# Patient Record
Sex: MALE | Race: WHITE | NOT HISPANIC OR LATINO | Employment: FULL TIME | URBAN - METROPOLITAN AREA
[De-identification: names, ages, dates, MRNs, and addresses within clinical notes are randomized per-mention and may not be internally consistent; named-entity substitution may affect disease eponyms.]

---

## 2017-01-11 ENCOUNTER — HOSPITAL ENCOUNTER (OUTPATIENT)
Dept: RADIOLOGY | Facility: MEDICAL CENTER | Age: 57
End: 2017-01-11
Attending: INTERNAL MEDICINE
Payer: COMMERCIAL

## 2017-01-11 DIAGNOSIS — L40.50 PSORIATIC ARTHROPATHY (HCC): ICD-10-CM

## 2017-01-11 DIAGNOSIS — L40.50 PSORIATIC ARTHRITIS (HCC): ICD-10-CM

## 2017-01-11 PROCEDURE — 72202 X-RAY EXAM SI JOINTS 3/> VWS: CPT

## 2017-01-11 PROCEDURE — 77077 JOINT SURVEY SINGLE VIEW: CPT

## 2017-01-12 ENCOUNTER — TELEPHONE (OUTPATIENT)
Dept: RHEUMATOLOGY | Facility: PHYSICIAN GROUP | Age: 57
End: 2017-01-12

## 2017-01-12 NOTE — TELEPHONE ENCOUNTER
Please notify patient we got x-ray reports back and her x-rays do indicate some arthritis compatible with psoriatic arthritis in the left foot x-ray, we can discuss further at her appointment March 2017 or patient wants to have a sooner appointment prescheduled earlier appointment, same day okay

## 2017-01-13 ENCOUNTER — TELEPHONE (OUTPATIENT)
Dept: RHEUMATOLOGY | Facility: PHYSICIAN GROUP | Age: 57
End: 2017-01-13

## 2017-01-13 DIAGNOSIS — R79.89 LFT ELEVATION: ICD-10-CM

## 2017-01-13 NOTE — TELEPHONE ENCOUNTER
Please notify patient when he got the results of blood tests done January 12, 2017, shows liver functions little bit elevated, did the patient drink any alcohol within 3 days of doing the blood tests? If so we need to recheck the liver tests to assure that they are back to normal, patient did not drink any alcohol within 3 days of doing the liver tests and we need to do more investigation such as an ultrasound to assure no gallbladder problems.  Liver tests ordered in the computer

## 2017-01-19 ENCOUNTER — OFFICE VISIT (OUTPATIENT)
Dept: RHEUMATOLOGY | Facility: PHYSICIAN GROUP | Age: 57
End: 2017-01-19
Payer: COMMERCIAL

## 2017-01-19 VITALS — BODY MASS INDEX: 30.14 KG/M2 | SYSTOLIC BLOOD PRESSURE: 120 MMHG | DIASTOLIC BLOOD PRESSURE: 70 MMHG | WEIGHT: 216 LBS

## 2017-01-19 DIAGNOSIS — K21.00 GASTROESOPHAGEAL REFLUX DISEASE WITH ESOPHAGITIS: ICD-10-CM

## 2017-01-19 DIAGNOSIS — L40.50 PSORIATIC ARTHRITIS (HCC): ICD-10-CM

## 2017-01-19 DIAGNOSIS — R79.89 LFT ELEVATION: ICD-10-CM

## 2017-01-19 PROCEDURE — 99214 OFFICE O/P EST MOD 30 MIN: CPT | Performed by: INTERNAL MEDICINE

## 2017-01-19 NOTE — Clinical Note
[facility]   [address], [address]  [place] [address]  Phone: [phone]  Fax: [phone]              Encounter Date: 1/19/2017    Dear Dr. Ling ref. provider found,    It was a pleasure seeing your patient, Evens Garcia, on 1/19/2017. Diagnoses of Psoriatic arthritis (CMS-[facility]), LFT elevation, and Gastroesophageal reflux disease with esophagitis were pertinent to this visit.     Please find attached progress note which includes the history I obtained from Mr. Garcia, my physical examination findings, my impression and recommendations.      Once again, it was a pleasure participating in your patient's care.  Please feel free to contact me if you have any questions or if I can be of any further assistance to your patients.      Sincerely,    Henny Yuan M.D.  Electronically Signed          PROGRESS NOTE:  Chief Complaint- joint pain    Subjective:   Evens Garcia is a 56 y.o. male here today for follow up of rheumatological issues    This is the second visit for this Pt who was  was dx with PSA about 2013 was treated with Dr Alva who has now moved out of the area, initially treated with methotrexate and sulfasalazine for which patient felt no difference, we evaluated the patient found minimal evidence for psoriatic arthritis, at last visit we did x-rays of sacroiliac joints, feet and hands for which the feet x-rays do show erosive arthritis on the fifth MTP joint on the left foot10. Patient currently off of all DMARD's for the last 10 months with what patient describes as minimal joint aches and pains. Patient continues to take naproxen without GI upset. Psoriasis unchanged, spot predmonantly in scalp. Pt uses Tar shampoo to keep scalp psoriasis under control. No iritis/uveitis, no DVT/PE, no wieght loss, no FUO, no organ problems, no achilles tendon problems, no colitis, stiff low back, pt has diverticulitis, denies any diabetes, denies any thyroid  problems  Patient currently works as a . Patient is also worried about his recent lab results indicating elevated liver function tests, as patient is a  and his  exam to maintain being a problem that requires liver function tests be normal. Patient does drink a glass or 2 of wine with dinner and the elevated liver functions are probably secondary to the dinner wine, we will recheck liver functions when patient is without wine for at least 3-4 days.    S/p MTX-nausea  S/p sulfasalazine-no benefit    RF neg 3/2013  TINA neg 3/2013  Feet x-rays 1/2017-indicates erosive arthritis on the left fifth MTP joint  Hand x-rays 1/2017-indicates DJD  SI joint x-rays 1/2017-no pathology    Current medicines (including changes today)  Current Outpatient Prescriptions   Medication Sig Dispense Refill   • Adalimumab 40 MG/0.8ML Prefilled Syringe Kit Inject 0.8 mL as instructed every 14 days. 6 Each 1   • fluocinonide (LIDEX) 0.05 % Cream Apply thin amount bid to rash prn 60 g 1   • naproxen (NAPROSYN) 500 MG Tab 1 tab po bid prn joint pain, take with food 180 Tab 0   • omeprazole (PRILOSEC) 20 MG delayed-release capsule Take 1 Cap by mouth every day. 90 Cap 3   • Naproxen Sodium (ALEVE) 220 MG Cap Take  by mouth.     • ibuprofen (MOTRIN) 200 MG TABS Take 200 mg by mouth every 6 hours as needed. Takes two prn        No current facility-administered medications for this visit.     He  has no past medical history on file.    ROS   Other than what is mentioned in HPI or physical exam, there is no history of headaches, double vision or blurred vision. No temporal tenderness or jaw claudication. No history of cataracts or glaucoma. No trouble swallowing difficulties or sore throats. No history of thyroid disease. No chest complaints including chest pain, cough or sputum production. No shortness of breath. No GI complaints including nausea, vomiting, change in bowel habits, or past peptic ulcer disease. No history of blood in  the stools. No urinary complaints including dysuria or frequency. No history of rash including psoriasis. No history of alopecia, photosensitivity, oral ulcerations, Raynaud's phenomena, or swollen joints. No history of gout. No back complaints. No history of low blood counts.       Objective:     Blood pressure 120/70, weight 97.977 kg (216 lb). Body mass index is 30.14 kg/(m^2).   Physical Exam:  Constitutional: Alert and oriented X3, no distress.Skin: Warm, dry, good turgor, no rashes in visible areas, small scalp with psoriasis on the right lateral aspect of the headEye: Equal, round and reactive, conjunctiva clear, lids normal EOM intact, patient wears glassesENMT: Lips without lesions, good dentition, no oropharyngeal ulcers, moist buccal mucosa, pinna without deformityNeck: Trachea midline, no masses, no thyromegaly.Lymph:  No cervical lymphadenopathy, no axillary lymphadenopathy, no supraclavicular lymphadenopathyRespiratory: Unlabored respiratory effort, lungs clear to auscultation, no wheezes, no ronchi.Cardiovascular: Normal S1, S2, no murmur, no edema.Abdomen: Soft, non-tender, no masses, no hepatosplenomegaly.Psych: Alert and oriented x3, normal affect and mood.Neuro: Cranial nerves 2-12 are grossly intact, no loss of sensation LEExt:no joint laxity noted in bilateral arms, no joint laxity noted in bilateral legs, no evidence of effusions, no swan-neck or boutonniere deformities,no flexure contractures, shoulders full range of motion, knees with mild crepitus but no effusions, no Achilles tendon inflammation, no sausage digits noted neither on toes or fingers, occiput to wall is 0 cm, gait without antalgia without foot drop, Schoebers is 10-14 cm, patient has mild dystrophic nails on the toenails, tenderness palpation on the left fifth MTP joint              Lab Results   Component Value Date/Time    SODIUM 140 03/15/2013 12:30 PM    POTASSIUM 3.8 03/15/2013 12:30 PM    CHLORIDE 102 03/15/2013 12:30 PM     CO2 21 03/15/2013 12:30 PM    GLUCOSE 109* 03/15/2013 12:30 PM    BUN 22 03/15/2013 12:30 PM    CREATININE 1.00 03/15/2013 12:30 PM    BUN-CREATININE RATIO 22* 03/15/2013 12:30 PM    GLOM FILT RATE, EST >59 11/19/2010 08:43 AM      Lab Results   Component Value Date/Time    WBC 9.2 03/15/2013 12:30 PM    RBC 5.33 03/15/2013 12:30 PM    HEMOGLOBIN 15.1 03/15/2013 12:30 PM    HEMATOCRIT 44.4 03/15/2013 12:30 PM    MCV 83 03/15/2013 12:30 PM    MCH 28.3 03/15/2013 12:30 PM    MCHC 34.0 03/15/2013 12:30 PM    NEUTROPHILS-POLYS 72 03/15/2013 12:30 PM    LYMPHOCYTES 20 03/15/2013 12:30 PM    MONOCYTES 6 03/15/2013 12:30 PM    EOSINOPHILS 2 03/15/2013 12:30 PM    BASOPHILS 0 03/15/2013 12:30 PM      Lab Results   Component Value Date/Time    CALCIUM 9.5 03/15/2013 12:30 PM    AST(SGOT) 33 03/15/2013 12:30 PM    ALT(SGPT) 39 03/15/2013 12:30 PM    ALKALINE PHOSPHATASE 119 03/15/2013 12:30 PM    TOTAL BILIRUBIN 0.4 03/15/2013 12:30 PM    ALBUMIN 4.4 03/15/2013 12:30 PM    TOTAL PROTEIN 6.9 03/15/2013 12:30 PM     Lab Results   Component Value Date/Time    RHEUMATOID FACTOR -NEPH- 8.8 03/15/2013 12:30 PM     Lab Results   Component Value Date/Time    ANTINUCLEAR ANTIBODY DIRECT Negative 03/15/2013 12:30 PM     Lab Results   Component Value Date/Time    SED RATE WESTERGREN 19 03/15/2013 12:30 PM     Results for orders placed during the hospital encounter of 01/11/17   DX-JOINT SURVEY-HANDS SINGLE VIEW    Impression 1.  No acute fracture or bone erosion.    2.  Mild osteoarthritis.     Results for orders placed during the hospital encounter of 01/11/17   DX-JOINT SURVEY-FEET SINGLE VIEW    Impression 1.  No acute fracture or dislocation.    2.  Some soft tissue swelling is noted around the MTP joint of the fifth digit of the left foot and subtle bone erosions in the distal metatarsal are identified. Findings could be due to gout. Inflammatory arthropathy is also possible.     Results for orders placed during the hospital  encounter of 12/21/07   DX-ANKLE 3+ VIEWS    Impression IMPRESSION:    SOFT TISSUE SWELLING AND EVIDENCE OF A JOINT EFFUSION WITHOUT EVIDENCE OF   ACUTE FRACTURE.      Julian        Read By SABA KAUR MD on Dec 21 2007  3:35PM  : STERLING Transcription Date: Dec 22 2007  6:29PM  THIS DOCUMENT HAS BEEN ELECTRONICALLY SIGNED BY: SABA KAUR MD on   Dec 24 2007  9:07AM     Results for orders placed during the hospital encounter of 01/11/17   DX-SACROILIAC JOINTS 3+    Impression Negative exam of the sacroiliac joints.     Results for orders placed in visit on 12/17/12   MR-KNEE-W/O    Impression 1. Advanced patellofemoral compartment osteoarthritis    2. Small medial meniscal tear the junction of the posterior horn and body.    3. Truncated medial meniscal free edge suggesting degeneration or from prior meniscectomy.    4. Very small tear of the lateral meniscus at the junction of posterior and body.    5. Joint effusion     Assessment and Plan:     1. Psoriatic arthritis (CMS-HCC)  Feet x-rays indicate erosive arthritis, patient status post a trial of DMARD with side effects and ineffectiveness, we will do a trial of a biologic including Humira, printed off information for patient to review and today we'll check screening labs i.e. hepatitis B and QuantiFERON Gold  - ALANINE AMINO-TRANS; Future  - ASPARTATE AMINO-HAIDER; Future  - Adalimumab 40 MG/0.8ML Prefilled Syringe Kit; Inject 0.8 mL as instructed every 14 days.  Dispense: 6 Each; Refill: 1  - WESTERGREN SED RATE; Future  - HEP B CORE AB IGM  - HEP B SURFACE ANTIGEN; Future  - TB QUANTIFERON GOLD; Future    2. LFT elevation  Most likely secondary to the dinner alcohol, patient to abstain from alcohol for at least 3-4 days and we will recheck the liver functions    3. Gastroesophageal reflux disease with esophagitis    Followup: Return in about 4 weeks (around 2/16/2017). or sooner prn to evaluate the Humira    Patient was seen 30 minutes  face-to-face of which more than 50% of the time was spent counseling the patient (excluding time for procedures)  regarding  rheumatological condition and care. Therapy was discussed in detail.    Please note that this dictation was created using voice recognition software. I have made every reasonable attempt to correct obvious errors, but I expect that there are errors of grammar and possibly content that I did not discover before finalizing the note.

## 2017-01-19 NOTE — Clinical Note
[facility]   [address], [address]  [place] [address]  Phone: [phone]  Fax: [phone]              Encounter Date: 1/19/2017    Dear  No ref. provider found,    It was a pleasure seeing your patient, Evens Garcia, on 1/19/2017. Diagnoses of Psoriatic arthritis (CMS-[facility]), LFT elevation, and Gastroesophageal reflux disease with esophagitis were pertinent to this visit.     Please find attached progress note which includes the history I obtained from Mr. Garcia, my physical examination findings, my impression and recommendations.      Once again, it was a pleasure participating in your patient's care.  Please feel free to contact me if you have any questions or if I can be of any further assistance to your patients.      Sincerely,    Henny Yuan M.D.  Electronically Signed          PROGRESS NOTE:  Chief Complaint- joint pain    Subjective:   Evens Garcia is a 56 y.o. male here today for follow up of rheumatological issues  No problem-specific assessment & plan notes found for this encounter.       Current medicines (including changes today)  Current Outpatient Prescriptions   Medication Sig Dispense Refill   • Adalimumab 40 MG/0.8ML Prefilled Syringe Kit Inject 0.8 mL as instructed every 14 days. 6 Each 1   • fluocinonide (LIDEX) 0.05 % Cream Apply thin amount bid to rash prn 60 g 1   • naproxen (NAPROSYN) 500 MG Tab 1 tab po bid prn joint pain, take with food 180 Tab 0   • omeprazole (PRILOSEC) 20 MG delayed-release capsule Take 1 Cap by mouth every day. 90 Cap 3   • Naproxen Sodium (ALEVE) 220 MG Cap Take  by mouth.     • ibuprofen (MOTRIN) 200 MG TABS Take 200 mg by mouth every 6 hours as needed. Takes two prn        No current facility-administered medications for this visit.     He  has no past medical history on file.    ROS   Other than what is mentioned in HPI or physical exam, there is no history of headaches, double vision or blurred vision.  No temporal tenderness or jaw claudication. No history of cataracts or glaucoma. No trouble swallowing difficulties or sore throats. No history of thyroid disease. No chest complaints including chest pain, cough or sputum production. No shortness of breath. No GI complaints including nausea, vomiting, change in bowel habits, or past peptic ulcer disease. No history of blood in the stools. No urinary complaints including dysuria or frequency. No history of rash including psoriasis. No history of alopecia, photosensitivity, oral ulcerations, Raynaud's phenomena, or swollen joints. No history of gout. No back complaints. No history of low blood counts.       Objective:     Blood pressure 120/70, weight 97.977 kg (216 lb). Body mass index is 30.14 kg/(m^2).   Physical Exam:    No results found for: QNTTBGOLD  No results found for: HEPBCORTOT, HEPBCORIGM, HEPBSAG  No results found for: HEPCAB  Lab Results   Component Value Date/Time    SODIUM 140 03/15/2013 12:30 PM    POTASSIUM 3.8 03/15/2013 12:30 PM    CHLORIDE 102 03/15/2013 12:30 PM    CO2 21 03/15/2013 12:30 PM    GLUCOSE 109* 03/15/2013 12:30 PM    BUN 22 03/15/2013 12:30 PM    CREATININE 1.00 03/15/2013 12:30 PM    BUN-CREATININE RATIO 22* 03/15/2013 12:30 PM    GLOM FILT RATE, EST >59 11/19/2010 08:43 AM      Lab Results   Component Value Date/Time    WBC 9.2 03/15/2013 12:30 PM    RBC 5.33 03/15/2013 12:30 PM    HEMOGLOBIN 15.1 03/15/2013 12:30 PM    HEMATOCRIT 44.4 03/15/2013 12:30 PM    MCV 83 03/15/2013 12:30 PM    MCH 28.3 03/15/2013 12:30 PM    MCHC 34.0 03/15/2013 12:30 PM    NEUTROPHILS-POLYS 72 03/15/2013 12:30 PM    LYMPHOCYTES 20 03/15/2013 12:30 PM    MONOCYTES 6 03/15/2013 12:30 PM    EOSINOPHILS 2 03/15/2013 12:30 PM    BASOPHILS 0 03/15/2013 12:30 PM      Lab Results   Component Value Date/Time    CALCIUM 9.5 03/15/2013 12:30 PM    AST(SGOT) 33 03/15/2013 12:30 PM    ALT(SGPT) 39 03/15/2013 12:30 PM    ALKALINE PHOSPHATASE 119 03/15/2013 12:30 PM      TOTAL BILIRUBIN 0.4 03/15/2013 12:30 PM    ALBUMIN 4.4 03/15/2013 12:30 PM    TOTAL PROTEIN 6.9 03/15/2013 12:30 PM     Lab Results   Component Value Date/Time    RHEUMATOID FACTOR -NEPH- 8.8 03/15/2013 12:30 PM     No results found for: RUSSELVIPER, DRVVTINTERP  No results found for: C4PBCGZAFSY, K9WOONFKVRN, IGGCARDIOLI, IGMCARDIOLI, IGACARDIOLI, CRYOGLOBULIN  Lab Results   Component Value Date/Time    ANTINUCLEAR ANTIBODY DIRECT Negative 03/15/2013 12:30 PM     No results found for: ANTIDNADS, DSDNA, AGBMAB, GBMABA, ANCAIGG, A6IDPDCYQBX, JO1AB, RNPAB, ANTISSASJ, ANTISSBSJ  No results found for: COLORURINE, SPECGRAVITY, PHURINE, GLUCOSEUR, KETONES, PROTEINURIN  Lab Results   Component Value Date/Time    SED RATE WESTERGREN 19 03/15/2013 12:30 PM     No results found for: HBA1C  No results found for: [id number]  No results found for: G6PD  No results found for: CPKTOTAL  No results found for: MICROSOMALA, ANTITHYROGL  No results found for: ANTIMITOCHO, FACTIN  No results found for: FLTYPE, CRYSTALSBDF  No results found for: CTELOPEP  No results found for: GBMABG  No results found for: PTHINTACT  Results for orders placed during the hospital encounter of 01/11/17   DX-JOINT SURVEY-HANDS SINGLE VIEW    Impression 1.  No acute fracture or bone erosion.    2.  Mild osteoarthritis.     Results for orders placed during the hospital encounter of 01/11/17   DX-JOINT SURVEY-FEET SINGLE VIEW    Impression 1.  No acute fracture or dislocation.    2.  Some soft tissue swelling is noted around the MTP joint of the fifth digit of the left foot and subtle bone erosions in the distal metatarsal are identified. Findings could be due to gout. Inflammatory arthropathy is also possible.                                        Results for orders placed during the hospital encounter of 12/21/07   DX-ANKLE 3+ VIEWS    Impression IMPRESSION:    SOFT TISSUE SWELLING AND EVIDENCE OF A JOINT EFFUSION WITHOUT EVIDENCE OF   ACUTE FRACTURE.       [email or web address]        Read By SABA KAUR MD on Dec 21 2007  3:35PM  : STERLING Transcription Date: Dec 22 2007  6:29PM  THIS DOCUMENT HAS BEEN ELECTRONICALLY SIGNED BY: SABA KAUR MD on   Dec 24 2007  9:07AM        Results for orders placed during the hospital encounter of 01/11/17   DX-SACROILIAC JOINTS 3+    Impression Negative exam of the sacroiliac joints.                 Results for orders placed in visit on 12/17/12   MR-KNEE-W/O    Impression 1. Advanced patellofemoral compartment osteoarthritis    2. Small medial meniscal tear the junction of the posterior horn and body.    3. Truncated medial meniscal free edge suggesting degeneration or from prior meniscectomy.    4. Very small tear of the lateral meniscus at the junction of posterior and body.    5. Joint effusion                                               Assessment and Plan:     1. Psoriatic arthritis (CMS-[facility])  ***  - ALANINE AMINO-TRANS; Future  - ASPARTATE AMINO-HAIDER; Future  - Adalimumab 40 MG/0.8ML Prefilled Syringe Kit; Inject 0.8 mL as instructed every 14 days.  Dispense: 6 Each; Refill: 1  - WESTERGREN SED RATE; Future  - HEP B CORE AB IGM  - HEP B SURFACE ANTIGEN; Future  - TB QUANTIFERON GOLD; Future    2. LFT elevation  ***    3. Gastroesophageal reflux disease with esophagitis  ***    Followup: Return in about 4 weeks (around 2/16/2017). or sooner prn    Patient was seen 30 minutes face-to-face of which more than 50% of the time was spent counseling the patient (excluding time for procedures)  regarding  rheumatological condition and care. Therapy was discussed in detail.    Please note that this dictation was created using voice recognition software. I have made every reasonable attempt to correct obvious errors, but I expect that there are errors of grammar and possibly content that I did not discover before finalizing the note.

## 2017-01-19 NOTE — MR AVS SNAPSHOT
Evens Garcia   2017 3:30 PM   Office Visit   MRN: [id number]    Department:  Rheumatology [facility]   Dept Phone:  [phone]    Description:  Male : 1960   Provider:  Henny Yuan M.D.           Reason for Visit     Follow-Up           Allergies as of 2017     Allergen Noted Reactions    Nkda [No Known Drug Allergy] 2007         You were diagnosed with     Psoriatic arthritis (CMS-HCC)   [313640]         Vital Signs     Blood Pressure Weight                120/70 mmHg 97.977 kg (216 lb)          Basic Information     Date Of Birth Sex Race Ethnicity Preferred Language    1960 Male White Non- English      Your appointments     2017  9:15 AM   Follow Up Visit with Henny Yuan M.D.   [facility] (--)    [address] [place] [address] [address]  [facility] [id number] [phone]           You will be receiving a confirmation call a few days before your appointment from our automated call confirmation system.              Problem List              ICD-10-CM Priority Class Noted - Resolved    Psoriatic arthritis (CMS-HCC) L40.50   1/15/2016 - Present    GERD (gastroesophageal reflux disease) K21.9   1/15/2016 - Present    Hyperlipidemia E78.5   1/15/2016 - Present    Family history of diabetes mellitus Z83.3   1/15/2016 - Present    Family history of prostate cancer in father Z80.42   1/15/2016 - Present    Elevated blood pressure I10   1/15/2016 - Present      Health Maintenance        Date Due Completion Dates    IMM DTaP/Tdap/Td Vaccine (1 - Tdap) 1979 ---    IMM INFLUENZA (1) 2016 ---    COLONOSCOPY 2020            Current Immunizations     No immunizations on file.      Below and/or attached are the medications your provider expects you to take. Review all of your home medications and newly ordered medications with your provider and/or pharmacist. Follow medication instructions as directed by your provider and/or  pharmacist. Please keep your medication list with you and share with your provider. Update the information when medications are discontinued, doses are changed, or new medications (including over-the-counter products) are added; and carry medication information at all times in the event of emergency situations     Allergies:  NKDA - (reactions not documented)               Medications  Valid as of: January 19, 2017 -  4:04 PM    Generic Name Brand Name Tablet Size Instructions for use    Adalimumab (Prefilled Syringe Kit) Adalimumab 40 MG/0.8ML Inject 0.8 mL as instructed every 14 days.        Fluocinonide (Cream) LIDEX 0.05 % Apply thin amount bid to rash prn        Ibuprofen (Tab) MOTRIN 200 MG Take 200 mg by mouth every 6 hours as needed. Takes two prn         Naproxen (Tab) NAPROSYN 500 MG 1 tab po bid prn joint pain, take with food        Naproxen Sodium (Cap) Naproxen Sodium 220 MG Take  by mouth.        Omeprazole (CAPSULE DELAYED RELEASE) PRILOSEC 20 MG Take 1 Cap by mouth every day.        .                 Medicines prescribed today were sent to:     [id number] - [place] - [address]    [address] [facility] [address]    Phone: [phone] Fax: [phone]    Open 24 Hours?: No      Medication refill instructions:       If your prescription bottle indicates you have medication refills left, it is not necessary to call your provider’s office. Please contact your pharmacy and they will refill your medication.    If your prescription bottle indicates you do not have any refills left, you may request refills at any time through one of the following ways: The online Jumbas system (except Urgent Care), by calling your provider’s office, or by asking your pharmacy to contact your provider’s office with a refill request. Medication refills are processed only during regular business hours and may not be available until the next business day. Your provider may request additional information or to have a  follow-up visit with you prior to refilling your medication.   *Please Note: Medication refills are assigned a new Rx number when refilled electronically. Your pharmacy may indicate that no refills were authorized even though a new prescription for the same medication is available at the pharmacy. Please request the medicine by name with the pharmacy before contacting your provider for a refill.        Your To Do List     Future Labs/Procedures Complete By Expires    ALANINE AMINO-TRANS  As directed 1/20/2018    ASPARTATE AMINO-HAIDER  As directed 1/20/2018    HEP B SURFACE ANTIGEN  As directed 1/20/2018    TB QUANTIFERON GOLD  As directed 1/20/2018    WESTERGREN SED RATE  As directed 1/19/2018         Enerplant Access Code: [email or web address]  Expires: 2/10/2017  2:09 PM    Enerplant  A secure, online tool to manage your health information     [employer]’s Enerplant® is a secure, online tool that connects you to your personalized health information from the privacy of your home -- day or night - making it very easy for you to manage your healthcare. Once the activation process is completed, you can even access your medical information using the Enerplant wyatt, which is available for free in the Apple Wyatt store or Google Play store.     Enerplant provides the following levels of access (as shown below):   My Chart Features   [facility] Doctor [facility]  Specialists [facility]  Urgent  Care Non-[facility]  Primary Care  Doctor   Email your healthcare team securely and privately 24/7 X X X    Manage appointments: schedule your next appointment; view details of past/upcoming appointments X      Request prescription refills. X      View recent personal medical records, including lab and immunizations X X X X   View health record, including health history, allergies, medications X X X X   Read reports about your outpatient visits, procedures, consult and ER notes X X X X   See your discharge summary, which is a recap of your hospital  and/or ER visit that includes your diagnosis, lab results, and care plan. X X       How to register for Sentrix:  1. Go to  [email or web address].  2. Click on the Sign Up Now box, which takes you to the New Member Sign Up page. You will need to provide the following information:  a. Enter your Sentrix Access Code exactly as it appears at the top of this page. (You will not need to use this code after you’ve completed the sign-up process. If you do not sign up before the expiration date, you must request a new code.)   b. Enter your date of birth.   c. Enter your home email address.   d. Click Submit, and follow the next screen’s instructions.  3. Create a Xeebelt ID. This will be your Sentrix login ID and cannot be changed, so think of one that is secure and easy to remember.  4. Create a Xeebelt password. You can change your password at any time.  5. Enter your Password Reset Question and Answer. This can be used at a later time if you forget your password.   6. Enter your e-mail address. This allows you to receive e-mail notifications when new information is available in Sentrix.  7. Click Sign Up. You can now view your health information.    For assistance activating your Sentrix account, call [phone]

## 2017-01-20 DIAGNOSIS — L40.50 PSORIATIC ARTHRITIS (HCC): ICD-10-CM

## 2017-01-20 NOTE — PROGRESS NOTES
Chief Complaint- joint pain    Subjective:   Evens Garcia is a 56 y.o. male here today for follow up of rheumatological issues    This is the second visit for this Pt who was  was dx with PSA about 2013 was treated with Dr Alva who has now moved out of the area, initially treated with methotrexate and sulfasalazine for which patient felt no difference, we evaluated the patient found minimal evidence for psoriatic arthritis, at last visit we did x-rays of sacroiliac joints, feet and hands for which the feet x-rays do show erosive arthritis on the fifth MTP joint on the left foot10. Patient currently off of all DMARD's for the last 10 months with what patient describes as minimal joint aches and pains. Patient continues to take naproxen without GI upset. Psoriasis unchanged, spot predmonantly in scalp. Pt uses Tar shampoo to keep scalp psoriasis under control. No iritis/uveitis, no DVT/PE, no wieght loss, no FUO, no organ problems, no achilles tendon problems, no colitis, stiff low back, pt has diverticulitis, denies any diabetes, denies any thyroid problems  Patient currently works as a . Patient is also worried about his recent lab results indicating elevated liver function tests, as patient is a  and his  exam to maintain being a problem that requires liver function tests be normal. Patient does drink a glass or 2 of wine with dinner and the elevated liver functions are probably secondary to the dinner wine, we will recheck liver functions when patient is without wine for at least 3-4 days.    S/p MTX-nausea  S/p sulfasalazine-no benefit    RF neg 3/2013  TINA neg 3/2013  Feet x-rays 1/2017-indicates erosive arthritis on the left fifth MTP joint  Hand x-rays 1/2017-indicates DJD  SI joint x-rays 1/2017-no pathology    Current medicines (including changes today)  Current Outpatient Prescriptions   Medication Sig Dispense Refill   • Adalimumab 40 MG/0.8ML Prefilled Syringe Kit Inject 0.8 mL as  instructed every 14 days. 6 Each 1   • fluocinonide (LIDEX) 0.05 % Cream Apply thin amount bid to rash prn 60 g 1   • naproxen (NAPROSYN) 500 MG Tab 1 tab po bid prn joint pain, take with food 180 Tab 0   • omeprazole (PRILOSEC) 20 MG delayed-release capsule Take 1 Cap by mouth every day. 90 Cap 3   • Naproxen Sodium (ALEVE) 220 MG Cap Take  by mouth.     • ibuprofen (MOTRIN) 200 MG TABS Take 200 mg by mouth every 6 hours as needed. Takes two prn        No current facility-administered medications for this visit.     He  has no past medical history on file.    ROS   Other than what is mentioned in HPI or physical exam, there is no history of headaches, double vision or blurred vision. No temporal tenderness or jaw claudication. No history of cataracts or glaucoma. No trouble swallowing difficulties or sore throats. No history of thyroid disease. No chest complaints including chest pain, cough or sputum production. No shortness of breath. No GI complaints including nausea, vomiting, change in bowel habits, or past peptic ulcer disease. No history of blood in the stools. No urinary complaints including dysuria or frequency. No history of rash including psoriasis. No history of alopecia, photosensitivity, oral ulcerations, Raynaud's phenomena, or swollen joints. No history of gout. No back complaints. No history of low blood counts.       Objective:     Blood pressure 120/70, weight 97.977 kg (216 lb). Body mass index is 30.14 kg/(m^2).   Physical Exam:  Constitutional: Alert and oriented X3, no distress.Skin: Warm, dry, good turgor, no rashes in visible areas, small scalp with psoriasis on the right lateral aspect of the headEye: Equal, round and reactive, conjunctiva clear, lids normal EOM intact, patient wears glassesENMT: Lips without lesions, good dentition, no oropharyngeal ulcers, moist buccal mucosa, pinna without deformityNeck: Trachea midline, no masses, no thyromegaly.Lymph:  No cervical lymphadenopathy, no  axillary lymphadenopathy, no supraclavicular lymphadenopathyRespiratory: Unlabored respiratory effort, lungs clear to auscultation, no wheezes, no ronchi.Cardiovascular: Normal S1, S2, no murmur, no edema.Abdomen: Soft, non-tender, no masses, no hepatosplenomegaly.Psych: Alert and oriented x3, normal affect and mood.Neuro: Cranial nerves 2-12 are grossly intact, no loss of sensation LEExt:no joint laxity noted in bilateral arms, no joint laxity noted in bilateral legs, no evidence of effusions, no swan-neck or boutonniere deformities,no flexure contractures, shoulders full range of motion, knees with mild crepitus but no effusions, no Achilles tendon inflammation, no sausage digits noted neither on toes or fingers, occiput to wall is 0 cm, gait without antalgia without foot drop, Schoebers is 10-14 cm, patient has mild dystrophic nails on the toenails, tenderness palpation on the left fifth MTP joint              Lab Results   Component Value Date/Time    SODIUM 140 03/15/2013 12:30 PM    POTASSIUM 3.8 03/15/2013 12:30 PM    CHLORIDE 102 03/15/2013 12:30 PM    CO2 21 03/15/2013 12:30 PM    GLUCOSE 109* 03/15/2013 12:30 PM    BUN 22 03/15/2013 12:30 PM    CREATININE 1.00 03/15/2013 12:30 PM    BUN-CREATININE RATIO 22* 03/15/2013 12:30 PM    GLOM FILT RATE, EST >59 11/19/2010 08:43 AM      Lab Results   Component Value Date/Time    WBC 9.2 03/15/2013 12:30 PM    RBC 5.33 03/15/2013 12:30 PM    HEMOGLOBIN 15.1 03/15/2013 12:30 PM    HEMATOCRIT 44.4 03/15/2013 12:30 PM    MCV 83 03/15/2013 12:30 PM    MCH 28.3 03/15/2013 12:30 PM    MCHC 34.0 03/15/2013 12:30 PM    NEUTROPHILS-POLYS 72 03/15/2013 12:30 PM    LYMPHOCYTES 20 03/15/2013 12:30 PM    MONOCYTES 6 03/15/2013 12:30 PM    EOSINOPHILS 2 03/15/2013 12:30 PM    BASOPHILS 0 03/15/2013 12:30 PM      Lab Results   Component Value Date/Time    CALCIUM 9.5 03/15/2013 12:30 PM    AST(SGOT) 33 03/15/2013 12:30 PM    ALT(SGPT) 39 03/15/2013 12:30 PM    ALKALINE PHOSPHATASE  119 03/15/2013 12:30 PM    TOTAL BILIRUBIN 0.4 03/15/2013 12:30 PM    ALBUMIN 4.4 03/15/2013 12:30 PM    TOTAL PROTEIN 6.9 03/15/2013 12:30 PM     Lab Results   Component Value Date/Time    RHEUMATOID FACTOR -NEPH- 8.8 03/15/2013 12:30 PM     Lab Results   Component Value Date/Time    ANTINUCLEAR ANTIBODY DIRECT Negative 03/15/2013 12:30 PM     Lab Results   Component Value Date/Time    SED RATE MARILYNREN 19 03/15/2013 12:30 PM     Results for orders placed during the hospital encounter of 01/11/17   DX-JOINT SURVEY-HANDS SINGLE VIEW    Impression 1.  No acute fracture or bone erosion.    2.  Mild osteoarthritis.     Results for orders placed during the hospital encounter of 01/11/17   DX-JOINT SURVEY-FEET SINGLE VIEW    Impression 1.  No acute fracture or dislocation.    2.  Some soft tissue swelling is noted around the MTP joint of the fifth digit of the left foot and subtle bone erosions in the distal metatarsal are identified. Findings could be due to gout. Inflammatory arthropathy is also possible.     Results for orders placed during the hospital encounter of 12/21/07   DX-ANKLE 3+ VIEWS    Impression IMPRESSION:    SOFT TISSUE SWELLING AND EVIDENCE OF A JOINT EFFUSION WITHOUT EVIDENCE OF   ACUTE FRACTURE.      [email or web address]        Read By SABA KAUR MD on Dec 21 2007  3:35PM  : LVDION Transcription Date: Dec 22 2007  6:29PM  THIS DOCUMENT HAS BEEN ELECTRONICALLY SIGNED BY: SABA KAUR MD on   Dec 24 2007  9:07AM     Results for orders placed during the hospital encounter of 01/11/17   DX-SACROILIAC JOINTS 3+    Impression Negative exam of the sacroiliac joints.     Results for orders placed in visit on 12/17/12   MR-KNEE-W/O    Impression 1. Advanced patellofemoral compartment osteoarthritis    2. Small medial meniscal tear the junction of the posterior horn and body.    3. Truncated medial meniscal free edge suggesting degeneration or from prior meniscectomy.    4. Very small tear of the  lateral meniscus at the junction of posterior and body.    5. Joint effusion     Assessment and Plan:     1. Psoriatic arthritis (CMS-HCC)  Feet x-rays indicate erosive arthritis, patient status post a trial of DMARD with side effects and ineffectiveness, we will do a trial of a biologic including Humira, printed off information for patient to review and today we'll check screening labs i.e. hepatitis B and QuantiFERON Gold  - ALANINE AMINO-TRANS; Future  - ASPARTATE AMINO-HAIDER; Future  - Adalimumab 40 MG/0.8ML Prefilled Syringe Kit; Inject 0.8 mL as instructed every 14 days.  Dispense: 6 Each; Refill: 1  - WESTERGREN SED RATE; Future  - HEP B CORE AB IGM  - HEP B SURFACE ANTIGEN; Future  - TB QUANTIFERON GOLD; Future    2. LFT elevation  Most likely secondary to the dinner alcohol, patient to abstain from alcohol for at least 3-4 days and we will recheck the liver functions    3. Gastroesophageal reflux disease with esophagitis    Followup: Return in about 4 weeks (around 2/16/2017). or sooner prn to evaluate the Humira    Patient was seen 30 minutes face-to-face of which more than 50% of the time was spent counseling the patient (excluding time for procedures)  regarding  rheumatological condition and care. Therapy was discussed in detail.    Please note that this dictation was created using voice recognition software. I have made every reasonable attempt to correct obvious errors, but I expect that there are errors of grammar and possibly content that I did not discover before finalizing the note.               Quantiferon Gold neg 2/2017 [facility]  Hep B neg 2/2017 [facility]

## 2017-02-17 ENCOUNTER — OFFICE VISIT (OUTPATIENT)
Dept: RHEUMATOLOGY | Facility: PHYSICIAN GROUP | Age: 57
End: 2017-02-17
Payer: COMMERCIAL

## 2017-02-17 VITALS
RESPIRATION RATE: 14 BRPM | SYSTOLIC BLOOD PRESSURE: 130 MMHG | TEMPERATURE: 99 F | DIASTOLIC BLOOD PRESSURE: 80 MMHG | HEART RATE: 84 BPM | OXYGEN SATURATION: 90 % | WEIGHT: 215 LBS | BODY MASS INDEX: 30 KG/M2

## 2017-02-17 DIAGNOSIS — R79.89 LFT ELEVATION: ICD-10-CM

## 2017-02-17 DIAGNOSIS — L40.50 PSORIATIC ARTHRITIS (HCC): ICD-10-CM

## 2017-02-17 DIAGNOSIS — K21.00 GASTROESOPHAGEAL REFLUX DISEASE WITH ESOPHAGITIS: ICD-10-CM

## 2017-02-17 PROCEDURE — 99214 OFFICE O/P EST MOD 30 MIN: CPT | Performed by: INTERNAL MEDICINE

## 2017-02-17 NOTE — PROGRESS NOTES
Chief Complaint- joint pain    Subjective:   Evens Garcia is a 56 y.o. male here today for follow up of rheumatological issues    This is a f/u for this Pt who was  was dx with PSA about 2013 was treated with Dr Alva who has now moved out of the area, initially treated with methotrexate and sulfasalazine for which patient felt no difference,we did x-rays of sacroiliac joints, feet and hands for which the feet x-rays do show erosive arthritis on the fifth MTP joint on the left foot. We have now been able to get Humira approved, patient still has not received shipment, awaiting shipment from [employer] or [employer].  Psoriasis unchanged, spot predmonantly in scalp. Pt uses Tar shampoo to keep scalp psoriasis under control. No iritis/uveitis, no DVT/PE, no wieght loss, no FUO, no organ problems, no achilles tendon problems, no colitis, stiff low back, pt has diverticulitis, denies any diabetes, denies any thyroid problems  Patient currently works as a .      S/p MTX-nausea  S/p sulfasalazine-no benefit    RF neg 3/2013  TINA neg 3/2013  Quantiferon Gold neg 2/2017 [facility]  Hep B neg 2/2017 [facility]  Feet x-rays 1/2017-indicates erosive arthritis on the left fifth MTP joint  Hand x-rays 1/2017-indicates DJD  SI joint x-rays 1/2017-no pathology       Current medicines (including changes today)  Current Outpatient Prescriptions   Medication Sig Dispense Refill   • Naproxen Sodium (ALEVE) 220 MG Cap Take  by mouth.     • fluocinonide (LIDEX) 0.05 % Cream Apply thin amount bid to rash prn 60 g 1   • naproxen (NAPROSYN) 500 MG Tab 1 tab po bid prn joint pain, take with food 180 Tab 0   • omeprazole (PRILOSEC) 20 MG delayed-release capsule Take 1 Cap by mouth every day. 90 Cap 3   • Adalimumab 40 MG/0.8ML Pen-injector Kit Inject 0.8 mL as instructed every 14 days. 6 Each 1   • ibuprofen (MOTRIN) 200 MG TABS Take 200 mg by mouth every 6 hours as needed. Takes two prn        No current facility-administered medications  for this visit.     He  has no past medical history on file.    ROS   Other than what is mentioned in HPI or physical exam, there is no history of headaches, double vision or blurred vision. No temporal tenderness or jaw claudication. No history of cataracts or glaucoma. No trouble swallowing difficulties or sore throats. No history of thyroid disease. No chest complaints including chest pain, cough or sputum production. No shortness of breath. No GI complaints including nausea, vomiting, change in bowel habits, or past peptic ulcer disease. No history of blood in the stools. No urinary complaints including dysuria or frequency. No history of rash including psoriasis. No history of alopecia, photosensitivity, oral ulcerations, Raynaud's phenomena, or swollen joints. No history of gout. No back complaints. No history of low blood counts.       Objective:     Blood pressure 130/80, pulse 84, temperature 37.2 °C (99 °F), resp. rate 14, weight 97.523 kg (215 lb), SpO2 90 %. Body mass index is 30 kg/(m^2).   Physical Exam:  Constitutional: Alert and oriented X3, no distress.Skin: Warm, dry, good turgor, no rashes in visible areas, small scalp with psoriasis on the right lateral aspect of the headEye: Equal, round and reactive, conjunctiva clear, lids normal EOM intact, patient wears glassesENMT: Lips without lesions, good dentition, no oropharyngeal ulcers, moist buccal mucosa, pinna without deformityNeck: Trachea midline, no masses, no thyromegaly.Lymph:  No cervical lymphadenopathy, no axillary lymphadenopathy, no supraclavicular lymphadenopathyRespiratory: Unlabored respiratory effort, lungs clear to auscultation, no wheezes, no ronchi.Cardiovascular: Normal S1, S2, no murmur, no edema.Abdomen: Soft, non-tender, no masses, no hepatosplenomegaly.Psych: Alert and oriented x3, normal affect and mood.Neuro: Cranial nerves 2-12 are grossly intact, no loss of sensation LEExt:no joint laxity noted in bilateral arms, no joint  laxity noted in bilateral legs, no evidence of effusions, no swan-neck or boutonniere deformities,no flexure contractures, shoulders full range of motion, knees with mild crepitus but no effusions, no Achilles tendon inflammation, no sausage digits noted neither on toes or fingers, occiput to wall is 0 cm, gait without antalgia without foot drop, Schoebers is 10-14 cm, patient has mild dystrophic nails on the toenails, tenderness palpation on the left fifth MTP joint    Lab Results   Component Value Date/Time    SODIUM 140 03/15/2013 12:30 PM    POTASSIUM 3.8 03/15/2013 12:30 PM    CHLORIDE 102 03/15/2013 12:30 PM    CO2 21 03/15/2013 12:30 PM    GLUCOSE 109* 03/15/2013 12:30 PM    BUN 22 03/15/2013 12:30 PM    CREATININE 1.00 03/15/2013 12:30 PM    BUN-CREATININE RATIO 22* 03/15/2013 12:30 PM    GLOM FILT RATE, EST >59 11/19/2010 08:43 AM      Lab Results   Component Value Date/Time    WBC 9.2 03/15/2013 12:30 PM    RBC 5.33 03/15/2013 12:30 PM    HEMOGLOBIN 15.1 03/15/2013 12:30 PM    HEMATOCRIT 44.4 03/15/2013 12:30 PM    MCV 83 03/15/2013 12:30 PM    MCH 28.3 03/15/2013 12:30 PM    MCHC 34.0 03/15/2013 12:30 PM    NEUTROPHILS-POLYS 72 03/15/2013 12:30 PM    LYMPHOCYTES 20 03/15/2013 12:30 PM    MONOCYTES 6 03/15/2013 12:30 PM    EOSINOPHILS 2 03/15/2013 12:30 PM    BASOPHILS 0 03/15/2013 12:30 PM      Lab Results   Component Value Date/Time    CALCIUM 9.5 03/15/2013 12:30 PM    AST(SGOT) 33 03/15/2013 12:30 PM    ALT(SGPT) 39 03/15/2013 12:30 PM    ALKALINE PHOSPHATASE 119 03/15/2013 12:30 PM    TOTAL BILIRUBIN 0.4 03/15/2013 12:30 PM    ALBUMIN 4.4 03/15/2013 12:30 PM    TOTAL PROTEIN 6.9 03/15/2013 12:30 PM     Lab Results   Component Value Date/Time    RHEUMATOID FACTOR -NEPH- 8.8 03/15/2013 12:30 PM     Lab Results   Component Value Date/Time    ANTINUCLEAR ANTIBODY DIRECT Negative 03/15/2013 12:30 PM     Lab Results   Component Value Date/Time    SED RATE WESTERGREN 19 03/15/2013 12:30 PM     Results for  orders placed during the hospital encounter of 01/11/17   DX-JOINT SURVEY-HANDS SINGLE VIEW    Impression 1.  No acute fracture or bone erosion.    2.  Mild osteoarthritis.     Results for orders placed during the hospital encounter of 01/11/17   DX-JOINT SURVEY-FEET SINGLE VIEW    Impression 1.  No acute fracture or dislocation.    2.  Some soft tissue swelling is noted around the MTP joint of the fifth digit of the left foot and subtle bone erosions in the distal metatarsal are identified. Findings could be due to gout. Inflammatory arthropathy is also possible.     Results for orders placed during the hospital encounter of 12/21/07   DX-ANKLE 3+ VIEWS    Impression IMPRESSION:    SOFT TISSUE SWELLING AND EVIDENCE OF A JOINT EFFUSION WITHOUT EVIDENCE OF   ACUTE FRACTURE.      GAK.lvd        Read By SABA KAUR MD on Dec 21 2007  3:35PM  : STERLING Transcription Date: Dec 22 2007  6:29PM  THIS DOCUMENT HAS BEEN ELECTRONICALLY SIGNED BY: SABA KAUR MD on   Dec 24 2007  9:07AM     Results for orders placed during the hospital encounter of 01/11/17   DX-SACROILIAC JOINTS 3+    Impression Negative exam of the sacroiliac joints.     Results for orders placed in visit on 12/17/12   MR-KNEE-W/O    Impression 1. Advanced patellofemoral compartment osteoarthritis    2. Small medial meniscal tear the junction of the posterior horn and body.    3. Truncated medial meniscal free edge suggesting degeneration or from prior meniscectomy.    4. Very small tear of the lateral meniscus at the junction of posterior and body.    5. Joint effusion     Assessment and Plan:     1. Psoriatic arthritis (CMS-HCC)  Today we will start Humira 40 mg subcutaneous every 2 weeks, have patient follow-up in about 6-8 weeks to evaluate efficacy    2. LFT elevation  Patient does drink wine daily at bedtime probably cause of patient's elevated liver functions    3. Gastroesophageal reflux disease with esophagitis  May impact  the type of medications we can use for this patient's arthritis. We will have to keep this under advisement.    4. Elevated blood pressure  May impact the type of medications we can use for this patient's arthritis. We will have to keep this under advisement.    Followup: Return in about 2 months (around 4/17/2017). or sooner prn    Patient was seen 30 minutes face-to-face of which more than 50% of the time was spent counseling the patient (excluding time for procedures)  regarding  rheumatological condition and care. Therapy was discussed in detail.    Please note that this dictation was created using voice recognition software. I have made every reasonable attempt to correct obvious errors, but I expect that there are errors of grammar and possibly content that I did not discover before finalizing the note.

## 2017-02-17 NOTE — MR AVS SNAPSHOT
Evens Garcia   2017 9:15 AM   Office Visit   MRN: [id number]    Department:  Rheumatology Med [facility] [place]   Dept Phone:  [phone]    Description:  Male : 1960   Provider:  Henny Yuan M.D.           Reason for Visit     Follow-Up           Allergies as of 2017     Allergen Noted Reactions    Nkda [No Known Drug Allergy] 2007         You were diagnosed with     Psoriatic arthritis (CMS-HCC)   [068335]       LFT elevation   [526051]       Gastroesophageal reflux disease with esophagitis   [6582027]         Vital Signs     Blood Pressure Pulse Temperature Respirations Weight Oxygen Saturation    130/80 mmHg 84 37.2 °C (99 °F) 14 97.523 kg (215 lb) 90%      Basic Information     Date Of Birth Sex Race Ethnicity Preferred Language    1960 Male White Non- English      Your appointments     2017  9:15 AM   Follow Up Visit with Henny Yuan M.D.   [facility] (--)    [address] [place] [id number] [phone]           You will be receiving a confirmation call a few days before your appointment from our automated call confirmation system.              Problem List              ICD-10-CM Priority Class Noted - Resolved    Psoriatic arthritis (CMS-HCC) L40.50   1/15/2016 - Present    GERD (gastroesophageal reflux disease) K21.9   1/15/2016 - Present    Hyperlipidemia E78.5   1/15/2016 - Present    Family history of diabetes mellitus Z83.3   1/15/2016 - Present    Family history of prostate cancer in father Z80.42   1/15/2016 - Present    Elevated blood pressure I10   1/15/2016 - Present      Health Maintenance        Date Due Completion Dates    IMM DTaP/Tdap/Td Vaccine (1 - Tdap) 1979 ---    IMM INFLUENZA (1) 2016 ---    COLONOSCOPY 2020            Current Immunizations     No immunizations on file.      Below and/or attached are the medications your provider expects you to take. Review all of your home  medications and newly ordered medications with your provider and/or pharmacist. Follow medication instructions as directed by your provider and/or pharmacist. Please keep your medication list with you and share with your provider. Update the information when medications are discontinued, doses are changed, or new medications (including over-the-counter products) are added; and carry medication information at all times in the event of emergency situations     Allergies:  NKDA - (reactions not documented)               Medications  Valid as of: February 17, 2017 -  9:35 AM    Generic Name Brand Name Tablet Size Instructions for use    Adalimumab (Pen-injector Kit) Adalimumab 40 MG/0.8ML Inject 0.8 mL as instructed every 14 days.        Fluocinonide (Cream) LIDEX 0.05 % Apply thin amount bid to rash prn        Ibuprofen (Tab) MOTRIN 200 MG Take 200 mg by mouth every 6 hours as needed. Takes two prn         Naproxen (Tab) NAPROSYN 500 MG 1 tab po bid prn joint pain, take with food        Naproxen Sodium (Cap) Naproxen Sodium 220 MG Take  by mouth.        Omeprazole (CAPSULE DELAYED RELEASE) PRILOSEC 20 MG Take 1 Cap by mouth every day.        .                 Medicines prescribed today were sent to:     [id number] - [place], [place] - [address]    [address] [facility] [address]    Phone: [phone] Fax: [phone]    Open 24 Hours?: No    [facility] - [place], [place] - [address]    [address] [address]    Phone: [phone] Fax: [phone]    Open 24 Hours?: No    [facility] - [place], [place] - [address] [place] [address]    [address] [facility] [address] [facility] [address]    Phone: [phone] Fax: [phone]    Open 24 Hours?: No      Medication refill instructions:       If your prescription bottle indicates you have medication refills left, it is not necessary to call your provider’s office. Please contact your pharmacy and they will refill your medication.      If your prescription bottle indicates you do not have any refills left, you may request refills at any time through one of the following ways: The online [employer] system (except Urgent Care), by calling your provider’s office, or by asking your pharmacy to contact your provider’s office with a refill request. Medication refills are processed only during regular business hours and may not be available until the next business day. Your provider may request additional information or to have a follow-up visit with you prior to refilling your medication.   *Please Note: Medication refills are assigned a new Rx number when refilled electronically. Your pharmacy may indicate that no refills were authorized even though a new prescription for the same medication is available at the pharmacy. Please request the medicine by name with the pharmacy before contacting your provider for a refill.           [employer] Access Code: [email or web address]  Expires: 3/19/2017  9:35 AM    [employer]  A secure, online tool to manage your health information     [employer]® is a secure, online tool that connects you to your personalized health information from the privacy of your home -- day or night - making it very easy for you to manage your healthcare. Once the activation process is completed, you can even access your medical information using the [employer] wyatt, which is available for free in the [employer] Wyatt store or Google Play store.     [employer] provides the following levels of access (as shown below):   My Chart Features   [facility]  Specialists [facility]  Urgent  Care Non-[facility]  Primary Care  Doctor   Email your healthcare team securely and privately 24/7 X X X    Manage appointments: schedule your next appointment; view details of past/upcoming appointments X      Request prescription refills. X      View recent personal medical records, including lab and immunizations X X X X   View health record, including  health history, allergies, medications X X X X   Read reports about your outpatient visits, procedures, consult and ER notes X X X X   See your discharge summary, which is a recap of your hospital and/or ER visit that includes your diagnosis, lab results, and care plan. X X       How to register for [employer]:  1. Go to  [email or web address].  2. Click on the Sign Up Now box, which takes you to the New Member Sign Up page. You will need to provide the following information:  a. Enter your [employer] Access Code exactly as it appears at the top of this page. (You will not need to use this code after you’ve completed the sign-up process. If you do not sign up before the expiration date, you must request a new code.)   b. Enter your date of birth.   c. Enter your home email address.   d. Click Submit, and follow the next screen’s instructions.  3. Create a [employer] ID. This will be your [employer] login ID and cannot be changed, so think of one that is secure and easy to remember.  4. Create a [employer] password. You can change your password at any time.  5. Enter your Password Reset Question and Answer. This can be used at a later time if you forget your password.   6. Enter your e-mail address. This allows you to receive e-mail notifications when new information is available in [employer].  7. Click Sign Up. You can now view your health information.    For assistance activating your [employer] account, call [phone]

## 2017-02-17 NOTE — Clinical Note
[facility]   [address], [address]  [place] [address]  Phone: [phone]  Fax: [phone]              Encounter Date: 2/17/2017    Dear Dr. Ling ref. provider found,    It was a pleasure seeing your patient, Evens Garcia, on 2/17/2017. Diagnoses of Psoriatic arthritis (CMS-[facility]), LFT elevation, Gastroesophageal reflux disease with esophagitis, and Elevated blood pressure were pertinent to this visit.     Please find attached progress note which includes the history I obtained from Mr. Garcia, my physical examination findings, my impression and recommendations.      Once again, it was a pleasure participating in your patient's care.  Please feel free to contact me if you have any questions or if I can be of any further assistance to your patients.      Sincerely,    Henny Yuan M.D.  Electronically Signed          PROGRESS NOTE:  No notes on file

## 2017-04-04 ENCOUNTER — OFFICE VISIT (OUTPATIENT)
Dept: RHEUMATOLOGY | Facility: PHYSICIAN GROUP | Age: 57
End: 2017-04-04
Payer: COMMERCIAL

## 2017-04-04 VITALS
OXYGEN SATURATION: 94 % | BODY MASS INDEX: 29.16 KG/M2 | SYSTOLIC BLOOD PRESSURE: 118 MMHG | RESPIRATION RATE: 14 BRPM | DIASTOLIC BLOOD PRESSURE: 70 MMHG | HEART RATE: 90 BPM | TEMPERATURE: 98.6 F | WEIGHT: 209 LBS

## 2017-04-04 DIAGNOSIS — K21.00 GASTROESOPHAGEAL REFLUX DISEASE WITH ESOPHAGITIS: ICD-10-CM

## 2017-04-04 DIAGNOSIS — R79.89 ELEVATED LFTS: ICD-10-CM

## 2017-04-04 DIAGNOSIS — L40.50 PSORIATIC ARTHRITIS (HCC): ICD-10-CM

## 2017-04-04 PROCEDURE — 99214 OFFICE O/P EST MOD 30 MIN: CPT | Performed by: INTERNAL MEDICINE

## 2017-04-04 NOTE — MR AVS SNAPSHOT
Evens Garcia   2017 9:15 AM   Office Visit   MRN: [id number]    Department:  Rheumatology Med [facility]   Dept Phone:  [phone]    Description:  Male : 1960   Provider:  Henny Yuan M.D.           Reason for Visit     Follow-Up           Allergies as of 2017     Allergen Noted Reactions    Nkda [No Known Drug Allergy] 2007         You were diagnosed with     Psoriatic arthritis (CMS-HCC)   [2062]         Vital Signs     Blood Pressure Pulse Temperature Respirations Weight Oxygen Saturation    118/70 mmHg 90 37 °C (98.6 °F) 14 94.802 kg (209 lb) 94%      Basic Information     Date Of Birth Sex Race Ethnicity Preferred Language    1960 Male White Non- English      Your appointments     2017 10:45 AM   Follow Up Visit with Henny Yuan M.D.   [facility] (--)    [address] [place] [address] [address]  [facility] [address] [phone]           You will be receiving a confirmation call a few days before your appointment from our automated call confirmation system.              Problem List              ICD-10-CM Priority Class Noted - Resolved    Psoriatic arthritis (CMS-HCC) L40.50   1/15/2016 - Present    GERD (gastroesophageal reflux disease) K21.9   1/15/2016 - Present    Hyperlipidemia E78.5   1/15/2016 - Present    Family history of diabetes mellitus Z83.3   1/15/2016 - Present    Family history of prostate cancer in father Z80.42   1/15/2016 - Present    Elevated blood pressure I10   1/15/2016 - Present      Health Maintenance        Date Due Completion Dates    IMM DTaP/Tdap/Td Vaccine (1 - Tdap) 1979 ---    COLONOSCOPY 2020            Current Immunizations     No immunizations on file.      Below and/or attached are the medications your provider expects you to take. Review all of your home medications and newly ordered medications with your provider and/or pharmacist. Follow medication instructions as directed by your  provider and/or pharmacist. Please keep your medication list with you and share with your provider. Update the information when medications are discontinued, doses are changed, or new medications (including over-the-counter products) are added; and carry medication information at all times in the event of emergency situations     Allergies:  NKDA - (reactions not documented)               Medications  Valid as of: April 04, 2017 -  9:33 AM    Generic Name Brand Name Tablet Size Instructions for use    Adalimumab (Pen-injector Kit) Adalimumab 40 MG/0.8ML Inject 0.8 mL as instructed every 14 days.        Fluocinonide (Cream) LIDEX 0.05 % Apply thin amount bid to rash prn        Ibuprofen (Tab) MOTRIN 200 MG Take 200 mg by mouth every 6 hours as needed. Takes two prn         Naproxen (Tab) NAPROSYN 500 MG 1 tab po bid prn joint pain, take with food        Naproxen Sodium (Cap) Naproxen Sodium 220 MG Take  by mouth.        Omeprazole (CAPSULE DELAYED RELEASE) PRILOSEC 20 MG Take 1 Cap by mouth every day.        .                 Medicines prescribed today were sent to:     [facility] - [place] - [address]    [address] [facility] [address]    Phone: [phone] Fax: [phone]    Open 24 Hours?: No    [facility] - [facility] - [place], [place] - [address] [facility]    [address] [facility] [address]    Phone: [phone] Fax: [phone]    Open 24 Hours?: No    [facility] - [place] [id number] [facility] [address] [facility] [address]    Phone: [phone] Fax: [phone]    Open 24 Hours?: No      Medication refill instructions:       If your prescription bottle indicates you have medication refills left, it is not necessary to call your provider’s office. Please contact your pharmacy and they will refill your medication.    If your prescription bottle indicates you do not have any refills left, you may request refills at any time through one of the  following ways: The online [employer] system (except Urgent Care), by calling your provider’s office, or by asking your pharmacy to contact your provider’s office with a refill request. Medication refills are processed only during regular business hours and may not be available until the next business day. Your provider may request additional information or to have a follow-up visit with you prior to refilling your medication.   *Please Note: Medication refills are assigned a new Rx number when refilled electronically. Your pharmacy may indicate that no refills were authorized even though a new prescription for the same medication is available at the pharmacy. Please request the medicine by name with the pharmacy before contacting your provider for a refill.        Your To Do List     Future Labs/Procedures Complete By Expires    CBC WITH DIFFERENTIAL  As directed 4/4/2018    COMP METABOLIC PANEL  As directed 4/4/2018    WESTERGREN SED RATE  As directed 4/4/2018         [employer] Access Code: [email or web address]  Expires: 4/22/2017 11:31 AM    [employer]  A secure, online tool to manage your health information     [employer]’s [employer]® is a secure, online tool that connects you to your personalized health information from the privacy of your home -- day or night - making it very easy for you to manage your healthcare. Once the activation process is completed, you can even access your medical information using the [employer] wyatt, which is available for free in the Apple Wyatt store or Google Play store.     [employer] Systems provides the following levels of access (as shown below):   My Chart Features   [facility] Doctor [facility]  Specialists [facility]  Urgent  Care Non-[facility]  Primary Care  Doctor   Email your healthcare team securely and privately 24/7 X X X    Manage appointments: schedule your next appointment; view details of past/upcoming appointments X      Request prescription refills. X      View recent personal medical  records, including lab and immunizations X X X X   View health record, including health history, allergies, medications X X X X   Read reports about your outpatient visits, procedures, consult and ER notes X X X X   See your discharge summary, which is a recap of your hospital and/or ER visit that includes your diagnosis, lab results, and care plan. X X       How to register for [employer]:  1. Go to  [email or web address].  2. Click on the Sign Up Now box, which takes you to the New Member Sign Up page. You will need to provide the following information:  a. Enter your [employer] Access Code exactly as it appears at the top of this page. (You will not need to use this code after you’ve completed the sign-up process. If you do not sign up before the expiration date, you must request a new code.)   b. Enter your date of birth.   c. Enter your home email address.   d. Click Submit, and follow the next screen’s instructions.  3. Create a [employer] ID. This will be your [employer] login ID and cannot be changed, so think of one that is secure and easy to remember.  4. Create a [employer] password. You can change your password at any time.  5. Enter your Password Reset Question and Answer. This can be used at a later time if you forget your password.   6. Enter your e-mail address. This allows you to receive e-mail notifications when new information is available in [employer].  7. Click Sign Up. You can now view your health information.    For assistance activating your [employer] account, call [phone]

## 2017-04-04 NOTE — Clinical Note
[facility]   [address], [address]  [place] [address]  Phone: [phone]  Fax: [phone]              Encounter Date: 4/4/2017    Dear Dr. Ling ref. provider found,    It was a pleasure seeing your patient, Evens Garcia, on 4/4/2017. Diagnoses of Psoriatic arthritis (CMS-[facility]), Elevated LFTs, Gastroesophageal reflux disease with esophagitis, and Elevated blood pressure were pertinent to this visit.     Please find attached progress note which includes the history I obtained from Mr. Garcia, my physical examination findings, my impression and recommendations.      Once again, it was a pleasure participating in your patient's care.  Please feel free to contact me if you have any questions or if I can be of any further assistance to your patients.      Sincerely,    Henny Yuan M.D.  Electronically Signed          PROGRESS NOTE:  No notes on file

## 2017-05-04 ENCOUNTER — TELEPHONE (OUTPATIENT)
Dept: RHEUMATOLOGY | Facility: PHYSICIAN GROUP | Age: 57
End: 2017-05-04

## 2017-05-04 NOTE — TELEPHONE ENCOUNTER
Patient needs to get clearance from his surgeon that he is not at risk for infection in his surgical site, if surgeon says no risk for infection, then pt can take the Humira

## 2017-05-04 NOTE — TELEPHONE ENCOUNTER
Pt hurt himself skiing, he had surgery on 4/26/17 to repair ruptured ACL, torn meniscus & something else. Pt took his last Humira dose approximately two weeks ago and is due to take it again tomorrow. Should he take it? Please advise.

## 2017-07-11 ENCOUNTER — OFFICE VISIT (OUTPATIENT)
Dept: RHEUMATOLOGY | Facility: PHYSICIAN GROUP | Age: 57
End: 2017-07-11
Payer: COMMERCIAL

## 2017-07-11 VITALS
SYSTOLIC BLOOD PRESSURE: 128 MMHG | RESPIRATION RATE: 14 BRPM | BODY MASS INDEX: 29.16 KG/M2 | WEIGHT: 209 LBS | HEART RATE: 103 BPM | TEMPERATURE: 98.6 F | DIASTOLIC BLOOD PRESSURE: 68 MMHG | OXYGEN SATURATION: 94 %

## 2017-07-11 DIAGNOSIS — I10 ESSENTIAL HYPERTENSION: ICD-10-CM

## 2017-07-11 DIAGNOSIS — L40.50 PSORIATIC ARTHRITIS (HCC): ICD-10-CM

## 2017-07-11 DIAGNOSIS — K21.00 GASTROESOPHAGEAL REFLUX DISEASE WITH ESOPHAGITIS: ICD-10-CM

## 2017-07-11 PROCEDURE — 99214 OFFICE O/P EST MOD 30 MIN: CPT | Performed by: INTERNAL MEDICINE

## 2017-07-11 NOTE — Clinical Note
[facility]   [address], [address]  [place] [address]  Phone: [phone]  Fax: [phone]              Encounter Date: 7/11/2017    Dear Dr. Ling ref. provider found,    It was a pleasure seeing your patient, Evens Garcia, on 7/11/2017. Diagnoses of Psoriatic arthritis (CMS-[facility]), Essential hypertension, and Gastroesophageal reflux disease with esophagitis were pertinent to this visit.     Please find attached progress note which includes the history I obtained from Mr. Garcia, my physical examination findings, my impression and recommendations.      Once again, it was a pleasure participating in your patient's care.  Please feel free to contact me if you have any questions or if I can be of any further assistance to your patients.      Sincerely,    Henny Yuan M.D.  Electronically Signed          PROGRESS NOTE:  No notes on file

## 2017-07-11 NOTE — MR AVS SNAPSHOT
Evens Garcia   2017 10:45 AM   Office Visit   MRN: [id number]    Department:  Rheumatology Med [facility]   Dept Phone:  [phone]    Description:  Male : 1960   Provider:  Henny Yuan M.D.           Reason for Visit     Follow-Up           Allergies as of 2017     Allergen Noted Reactions    Nkda [No Known Drug Allergy] 2007         You were diagnosed with     Psoriatic arthritis (CMS-HCC)   [633355]       Essential hypertension   [8353788]       Gastroesophageal reflux disease with esophagitis   [0937879]         Vital Signs     Blood Pressure Pulse Temperature Respirations Weight Oxygen Saturation    128/68 mmHg 103 37 °C (98.6 °F) 14 94.802 kg (209 lb) 94%      Basic Information     Date Of Birth Sex Race Ethnicity Preferred Language    1960 Male White Non- English      Your appointments     2018  8:45 AM   Follow Up Visit with Henny Yuan M.D.   [facility] (--)    [address] [place] [address] [phone]           You will be receiving a confirmation call a few days before your appointment from our automated call confirmation system.              Problem List              ICD-10-CM Priority Class Noted - Resolved    Psoriatic arthritis (CMS-HCC) L40.50   1/15/2016 - Present    GERD (gastroesophageal reflux disease) K21.9   1/15/2016 - Present    Hyperlipidemia E78.5   1/15/2016 - Present    Family history of diabetes mellitus Z83.3   1/15/2016 - Present    Family history of prostate cancer in father Z80.42   1/15/2016 - Present    Elevated blood pressure [id number]   1/15/2016 - Present      Health Maintenance        Date Due Completion Dates    IMM DTaP/Tdap/Td Vaccine (1 - Tdap) 1979 ---    IMM INFLUENZA (1) 2017 ---    COLONOSCOPY 2020            Current Immunizations     No immunizations on file.      Below and/or attached are the medications your provider expects you to take. Review  all of your home medications and newly ordered medications with your provider and/or pharmacist. Follow medication instructions as directed by your provider and/or pharmacist. Please keep your medication list with you and share with your provider. Update the information when medications are discontinued, doses are changed, or new medications (including over-the-counter products) are added; and carry medication information at all times in the event of emergency situations     Allergies:  NKDA - (reactions not documented)               Medications  Valid as of: July 11, 2017 - 11:06 AM    Generic Name Brand Name Tablet Size Instructions for use    Adalimumab (Pen-injector Kit) Adalimumab 40 MG/0.8ML Inject 0.8 mL as instructed every 14 days.        Fluocinonide (Cream) LIDEX 0.05 % Apply thin amount bid to rash prn        Ibuprofen (Tab) MOTRIN 200 MG Take 200 mg by mouth every 6 hours as needed. Takes two prn         Naproxen (Tab) NAPROSYN 500 MG 1 tab po bid prn joint pain, take with food        Naproxen Sodium (Cap) Naproxen Sodium 220 MG Take  by mouth.        Omeprazole (CAPSULE DELAYED RELEASE) PRILOSEC 20 MG Take 1 Cap by mouth every day.        .                 Medicines prescribed today were sent to:     [facility] - [place], [place] - [address]    [address] [facility] [address]    Phone: [phone] Fax: [phone]    Open 24 Hours?: No    [facility] [place] - [address]    [address] [facility] [address]    Phone: [phone] Fax: [phone]    Open 24 Hours?: No    [facility] - [place], [place] - [address] [place] [address]    [address] [facility] [address] [facility] [address]    Phone: [phone] Fax: [phone]    Open 24 Hours?: No      Medication refill instructions:       If your prescription bottle indicates you have medication refills left, it is not necessary to call your provider’s office. Please contact your pharmacy and they will refill your  medication.    If your prescription bottle indicates you do not have any refills left, you may request refills at any time through one of the following ways: The online [employer] system (except Urgent Care), by calling your provider’s office, or by asking your pharmacy to contact your provider’s office with a refill request. Medication refills are processed only during regular business hours and may not be available until the next business day. Your provider may request additional information or to have a follow-up visit with you prior to refilling your medication.   *Please Note: Medication refills are assigned a new Rx number when refilled electronically. Your pharmacy may indicate that no refills were authorized even though a new prescription for the same medication is available at the pharmacy. Please request the medicine by name with the pharmacy before contacting your provider for a refill.        Your To Do List     Future Labs/Procedures Complete By Expires    CCP ANTIBODY  As directed 7/12/2018    COMP METABOLIC PANEL  As directed 7/11/2018    URIC ACID  As directed 7/11/2018    WESTERGREN SED RATE  As directed 7/11/2018    Standing Orders Interval Expires    CBC WITH DIFFERENTIAL   7/11/2018         Ed[employer] Access Code: [email or web address]  Expires: 8/10/2017 11:06 AM    [employer]  A secure, online tool to manage your health information     [employer]’s Edusoft® is a secure, online tool that connects you to your personalized health information from the privacy of your home -- day or night - making it very easy for you to manage your healthcare. Once the activation process is completed, you can even access your medical information using the Edusoft wyatt, which is available for free in the Apple Wyatt store or Google Play store.     [employer] provides the following levels of access (as shown below):   My Chart Features   [facility] Doctor [facility]  Specialists [facility]  Urgent  Care Non-[facility]  Primary  Care  Doctor   Email your healthcare team securely and privately 24/7 X X X    Manage appointments: schedule your next appointment; view details of past/upcoming appointments X      Request prescription refills. X      View recent personal medical records, including lab and immunizations X X X X   View health record, including health history, allergies, medications X X X X   Read reports about your outpatient visits, procedures, consult and ER notes X X X X   See your discharge summary, which is a recap of your hospital and/or ER visit that includes your diagnosis, lab results, and care plan. X X       How to register for [employer]:  1. Go to  [email or web address].  2. Click on the Sign Up Now box, which takes you to the New Member Sign Up page. You will need to provide the following information:  a. Enter your [employer] Access Code exactly as it appears at the top of this page. (You will not need to use this code after you’ve completed the sign-up process. If you do not sign up before the expiration date, you must request a new code.)   b. Enter your date of birth.   c. Enter your home email address.   d. Click Submit, and follow the next screen’s instructions.  3. Create a [employer] ID. This will be your [employer] login ID and cannot be changed, so think of one that is secure and easy to remember.  4. Create a [employer] password. You can change your password at any time.  5. Enter your Password Reset Question and Answer. This can be used at a later time if you forget your password.   6. Enter your e-mail address. This allows you to receive e-mail notifications when new information is available in [employer].  7. Click Sign Up. You can now view your health information.    For assistance activating your [employer] account, call [phone]

## 2017-07-11 NOTE — PROGRESS NOTES
Chief Complaint- joint pain    Subjective:   Evens Garcia is a 57 y.o. male here today for follow up of rheumatological issues    This is a f/u for this Pt who was  was dx with PSA about 2013 was treated with Dr Alva who has now moved out of the area, initially treated with methotrexate and sulfasalazine for which patient felt no difference,we did x-rays of sacroiliac joints, feet and hands for which the feet x-rays do show erosive arthritis on the fifth MTP joint on the left foot. We have now been able to get Humira approved, and patient now on Humira for about 6 months and doing really quite well. Patient states his psoriasis is resolving, joint still really quite good, still has some pain in his left index PIP joint. Patient denies any side effects from Humira, denies any recurrent infections, denies any new rashes, denies any unexplained weight loss, denies any fevers of unknown etiology, denies any new neurological symptoms. No iritis/uveitis, no DVT/PE, no wieght loss, no FUO, no organ problems, no achilles tendon problems, no colitis, stiff low back, pt has diverticulitis, denies any diabetes, denies any thyroid problems  Patient currently works as a .  Since last visit, patient injured his left knee with meniscus and anterior cruciate ligament tears status post surgical intervention still doing physical therapy and trying to recover from the surgery. This was done while skiing.      S/p MTX-nausea, elevated LFTs  S/p sulfasalazine-no benefit      CCP neg 1/2018 [facility]  RF neg 3/2013  TINA neg 3/2013  Uric acid 4.9 1/2018 [facility]  Quantiferon Gold neg 2/2017 [facility]  Hep B neg 2/2017 [facility]  Feet x-rays 1/2017-indicates erosive arthritis on the left fifth MTP joint  Hand x-rays 1/2017-indicates DJD  SI joint x-rays 1/2017-no pathology           Current medicines (including changes today)  Current Outpatient Prescriptions   Medication Sig Dispense Refill   • Adalimumab 40 MG/0.8ML Pen-injector  Kit Inject 0.8 mL as instructed every 14 days. 6 Each 1   • omeprazole (PRILOSEC) 20 MG delayed-release capsule Take 1 Cap by mouth every day. 90 Cap 3   • Naproxen Sodium (ALEVE) 220 MG Cap Take  by mouth.     • fluocinonide (LIDEX) 0.05 % Cream Apply thin amount bid to rash prn 60 g 1   • naproxen (NAPROSYN) 500 MG Tab 1 tab po bid prn joint pain, take with food 180 Tab 0   • ibuprofen (MOTRIN) 200 MG TABS Take 200 mg by mouth every 6 hours as needed. Takes two prn        No current facility-administered medications for this visit.     He  has no past medical history on file.    ROS   Other than what is mentioned in HPI or physical exam, there is no history of headaches, double vision or blurred vision. No temporal tenderness or jaw claudication. No history of cataracts or glaucoma. No trouble swallowing difficulties or sore throats. No history of thyroid disease. No chest complaints including chest pain, cough or sputum production. No shortness of breath. No GI complaints including nausea, vomiting, change in bowel habits, or past peptic ulcer disease. No history of blood in the stools. No urinary complaints including dysuria or frequency. No history of rash including psoriasis. No history of alopecia, photosensitivity, oral ulcerations, Raynaud's phenomena, or swollen joints. No history of gout. No back complaints. No history of low blood counts.       Objective:     Blood pressure 128/68, pulse 103, temperature 37 °C (98.6 °F), resp. rate 14, weight 94.802 kg (209 lb), SpO2 94 %. Body mass index is 29.16 kg/(m^2).   Physical Exam:  Constitutional: Alert and oriented X3, no distress.Skin: Warm, dry, good turgor, patient has dry skin on his hands dorsal aspect as well as on the dorsal aspect but marsha psoriatic plaques are resolving, patient still with a 1 inch psoriatic plaque in his hairline right forehead Eye: Equal, round and reactive, conjunctiva clear, lids normal EOM intact, patient wears glassesENMT: Lips  without lesions, good dentition, no oropharyngeal ulcers, moist buccal mucosa, pinna without deformityNeck: Trachea midline, no masses, no thyromegaly.Lymph:  No cervical lymphadenopathy, no axillary lymphadenopathy, no supraclavicular lymphadenopathyRespiratory: Unlabored respiratory effort, lungs clear to auscultation, no wheezes, no ronchi.Cardiovascular: Normal S1, S2, no murmur, no edema.Abdomen: Soft, non-tender, no masses, no hepatosplenomegaly.Psych: Alert and oriented x3, normal affect and mood.Neuro: Cranial nerves 2-12 are grossly intact, no loss of sensation LEExt:no joint laxity noted in bilateral arms, no joint laxity noted in bilateral legs, no evidence of effusions, no swan-neck or boutonniere deformities,no flexure contractures, shoulders full range of motion, knees with mild crepitus but no effusions, patient still has some residual swelling in the left knee status post surgical intervention for a torn anterior cruciate ligament and meniscus tear, no Achilles tendon inflammation, no sausage digits noted neither on toes or fingers, occiput to wall is 0 cm, gait without antalgia without foot drop, Schoebers is 10-14 cm, patient has mild dystrophic nails on the toenails, tenderness palpation of the left index PIP joint but without marsha effusion.    Lab Results   Component Value Date/Time    SODIUM 140 03/15/2013 12:30 PM    POTASSIUM 3.8 03/15/2013 12:30 PM    CHLORIDE 102 03/15/2013 12:30 PM    CO2 21 03/15/2013 12:30 PM    GLUCOSE 109* 03/15/2013 12:30 PM    BUN 22 03/15/2013 12:30 PM    CREATININE 1.00 03/15/2013 12:30 PM    BUN-CREATININE RATIO 22* 03/15/2013 12:30 PM    GLOM FILT RATE, EST >59 11/19/2010 08:43 AM      Lab Results   Component Value Date/Time    WBC 9.2 03/15/2013 12:30 PM    RBC 5.33 03/15/2013 12:30 PM    HEMOGLOBIN 15.1 03/15/2013 12:30 PM    HEMATOCRIT 44.4 03/15/2013 12:30 PM    MCV 83 03/15/2013 12:30 PM    MCH 28.3 03/15/2013 12:30 PM    MCHC 34.0 03/15/2013 12:30 PM     NEUTROPHILS-POLYS 72 03/15/2013 12:30 PM    LYMPHOCYTES 20 03/15/2013 12:30 PM    MONOCYTES 6 03/15/2013 12:30 PM    EOSINOPHILS 2 03/15/2013 12:30 PM    BASOPHILS 0 03/15/2013 12:30 PM      Lab Results   Component Value Date/Time    CALCIUM 9.5 03/15/2013 12:30 PM    AST(SGOT) 33 03/15/2013 12:30 PM    ALT(SGPT) 39 03/15/2013 12:30 PM    ALKALINE PHOSPHATASE 119 03/15/2013 12:30 PM    TOTAL BILIRUBIN 0.4 03/15/2013 12:30 PM    ALBUMIN 4.4 03/15/2013 12:30 PM    TOTAL PROTEIN 6.9 03/15/2013 12:30 PM     Lab Results   Component Value Date/Time    RHEUMATOID FACTOR -NEPH- 8.8 03/15/2013 12:30 PM     Lab Results   Component Value Date/Time    ANTINUCLEAR ANTIBODY DIRECT Negative 03/15/2013 12:30 PM     Lab Results   Component Value Date/Time    SED RATE WESTERGREN 19 03/15/2013 12:30 PM     Results for orders placed during the hospital encounter of 01/11/17   DX-JOINT SURVEY-HANDS SINGLE VIEW    Impression 1.  No acute fracture or bone erosion.    2.  Mild osteoarthritis.     Results for orders placed during the hospital encounter of 01/11/17   DX-JOINT SURVEY-FEET SINGLE VIEW    Impression 1.  No acute fracture or dislocation.    2.  Some soft tissue swelling is noted around the MTP joint of the fifth digit of the left foot and subtle bone erosions in the distal metatarsal are identified. Findings could be due to gout. Inflammatory arthropathy is also possible.     Results for orders placed during the hospital encounter of 12/21/07   DX-ANKLE 3+ VIEWS    Impression IMPRESSION:    SOFT TISSUE SWELLING AND EVIDENCE OF A JOINT EFFUSION WITHOUT EVIDENCE OF   ACUTE FRACTURE.      [email or web address]        Read By SABA KAUR MD on Dec 21 2007  3:35PM  : STERLING Transcription Date: Dec 22 2007  6:29PM  THIS DOCUMENT HAS BEEN ELECTRONICALLY SIGNED BY: SABA KAUR MD on   Dec 24 2007  9:07AM     Results for orders placed during the hospital encounter of 01/11/17   DX-SACROILIAC JOINTS 3+    Impression Negative  exam of the sacroiliac joints.     Results for orders placed in visit on 12/17/12   MR-KNEE-W/O    Impression 1. Advanced patellofemoral compartment osteoarthritis    2. Small medial meniscal tear the junction of the posterior horn and body.    3. Truncated medial meniscal free edge suggesting degeneration or from prior meniscectomy.    4. Very small tear of the lateral meniscus at the junction of posterior and body.    5. Joint effusion     Assessment and Plan:     1. Psoriatic arthritis (CMS-[facility])  Continue Humira 40 mg subcutaneous every 2 weeks  We reviewed risks and benefits including infection risks   labs due August 2017, labs ordered for patient  - Adalimumab 40 MG/0.8ML Pen-injector Kit; Inject 0.8 mL as instructed every 14 days.  Dispense: 6 Each; Refill: 1  - CBC WITH DIFFERENTIAL; Standing  - COMP METABOLIC PANEL; Future  - WESTERGREN SED RATE; Future  - URIC ACID; Future  - CCP ANTIBODY; Future    2. Essential hypertension  May impact the type of medications we can use for this patient's arthritis. We will have to keep this under advisement.  - Adalimumab 40 MG/0.8ML Pen-injector Kit; Inject 0.8 mL as instructed every 14 days.  Dispense: 6 Each; Refill: 1    3. Gastroesophageal reflux disease with esophagitis    Followup: Return in about 6 months (around 1/11/2018). or sooner prn    Patient was seen 30 minutes face-to-face of which more than 50% of the time was spent counseling the patient (excluding time for procedures)  regarding  rheumatological condition and care. Therapy was discussed in detail.    Please note that this dictation was created using voice recognition software. I have made every reasonable attempt to correct obvious errors, but I expect that there are errors of grammar and possibly content that I did not discover before finalizing the note.

## 2018-01-23 ENCOUNTER — OFFICE VISIT (OUTPATIENT)
Dept: RHEUMATOLOGY | Facility: PHYSICIAN GROUP | Age: 58
End: 2018-01-23
Payer: COMMERCIAL

## 2018-01-23 VITALS
BODY MASS INDEX: 29.99 KG/M2 | RESPIRATION RATE: 14 BRPM | WEIGHT: 215 LBS | DIASTOLIC BLOOD PRESSURE: 78 MMHG | OXYGEN SATURATION: 95 % | HEART RATE: 90 BPM | TEMPERATURE: 98.4 F | SYSTOLIC BLOOD PRESSURE: 122 MMHG

## 2018-01-23 DIAGNOSIS — R79.89 LFT ELEVATION: ICD-10-CM

## 2018-01-23 DIAGNOSIS — I10 ESSENTIAL HYPERTENSION: ICD-10-CM

## 2018-01-23 DIAGNOSIS — L40.50 PSORIATIC ARTHRITIS (HCC): ICD-10-CM

## 2018-01-23 DIAGNOSIS — K21.00 GASTROESOPHAGEAL REFLUX DISEASE WITH ESOPHAGITIS: ICD-10-CM

## 2018-01-23 PROCEDURE — 99214 OFFICE O/P EST MOD 30 MIN: CPT | Performed by: INTERNAL MEDICINE

## 2018-01-23 NOTE — PROGRESS NOTES
Chief Complaint- joint pain    Subjective:   Evens Garcia is a 57 y.o. male here today for follow up of rheumatological issues    This is a f/u for this Pt who was  was dx with PSA about 2013 was treated with Dr Alva. Patient currently on Humira 40 mg subcutaneous every 2 weeks and doing quite well regarding joints, patient states his psoriasis is resolving but still has residual psoriasis in his scalp. Patient denies any side effects from Humira, denies any recurrent infections, denies any new rashes, denies any unexplained weight loss, denies any fevers of unknown etiology, denies any new neurological symptoms. No iritis/uveitis, no DVT/PE, no wieght loss, no FUO, no organ problems, no achilles tendon problems, no colitis, stiff low back, pt has diverticulitis, denies any diabetes, denies any thyroid problems.    Patient's been having problems with elevated liver functions of unclear etiology, patient stop methotrexate the liver function still elevated, patient is going to be following up with his flight surgeon for further evaluation.  Patient currently works as a .          S/p MTX-nausea, elevated LFTs  S/p sulfasalazine-no benefit     Anti-actin ab neg 7/2018-[facility]  Mitochondrial ab neg 7/2018 [facility]     CCP neg 1/2018 [facility]  RF neg 3/2013  TINA neg 3/2013  Uric acid 4.9 1/2018 [facility]  Quantiferon Gold neg 2/2017 [facility]; Quantiferon Gold neg 7/2018 [facility]  Hep B neg 2/2017 [facility]; HBsAg/HBcAb neg 7/2018 [facility]  HCV neg 7/2018 [facility]  Feet x-rays 1/2017-indicates erosive arthritis on the left fifth MTP joint  Hand x-rays 1/2017-indicates DJD  SI joint x-rays 1/2017-no pathology           Current medicines (including changes today)  Current Outpatient Prescriptions   Medication Sig Dispense Refill   • Adalimumab 40 MG/0.8ML Pen-injector Kit Inject 0.8 mL as instructed every 14 days. 6 Each 1   • omeprazole (PRILOSEC) 20 MG delayed-release capsule Take 1 Cap by mouth every day. 90 Cap 3    • Naproxen Sodium (ALEVE) 220 MG Cap Take  by mouth.     • fluocinonide (LIDEX) 0.05 % Cream Apply thin amount bid to rash prn 60 g 1   • naproxen (NAPROSYN) 500 MG Tab 1 tab po bid prn joint pain, take with food 180 Tab 0   • ibuprofen (MOTRIN) 200 MG TABS Take 200 mg by mouth every 6 hours as needed. Takes two prn        No current facility-administered medications for this visit.      He  has no past medical history on file.    ROS   Other than what is mentioned in HPI or physical exam, there is no history of headaches, double vision or blurred vision. No temporal tenderness or jaw claudication. No history of cataracts or glaucoma. No trouble swallowing difficulties or sore throats. No history of thyroid disease. No chest complaints including chest pain, cough or sputum production. No shortness of breath. No GI complaints including nausea, vomiting, change in bowel habits, or past peptic ulcer disease. No history of blood in the stools. No urinary complaints including dysuria or frequency. No history of rash including psoriasis. No history of alopecia, photosensitivity, oral ulcerations, Raynaud's phenomena, or swollen joints. No history of gout. No back complaints. No history of low blood counts.       Objective:     Blood pressure 122/78, pulse 90, temperature 36.9 °C (98.4 °F), resp. rate 14, weight 97.5 kg (215 lb), SpO2 95 %. Body mass index is 29.99 kg/m².   Physical Exam:  Constitutional: Alert and oriented X3, no distress.Skin: Warm, dry, good turgor, patient still has a small plaque of psoriasis and his scalp Eye: Equal, round and reactive, conjunctiva clear, lids normal EOM intact, patient wears glassesENMT: Lips without lesions, good dentition, no oropharyngeal ulcers, moist buccal mucosa, pinna without deformityNeck: Trachea midline, no masses, no thyromegaly.Lymph:  No cervical lymphadenopathy, no axillary lymphadenopathy, no supraclavicular lymphadenopathyRespiratory: Unlabored respiratory effort,  lungs clear to auscultation, no wheezes, no ronchi.Cardiovascular: Normal S1, S2, no murmur, no edema.Abdomen: Soft, non-tender, no masses, no hepatosplenomegaly, did not feel a liver edge Psych: Alert and oriented x3, normal affect and mood.Neuro: Cranial nerves 2-12 are grossly intact, no loss of sensation LEExt:no joint laxity noted in bilateral arms, no joint laxity noted in bilateral legs, no evidence of effusions, no swan-neck or boutonniere deformities,no flexure contractures, shoulders full range of motion, knees with mild crepitus but no effusions,  no Achilles tendon inflammation, no sausage digits noted neither on toes or fingers, previous occiput to wall is 0 cm, gait without antalgia without foot drop, previous Schoebers is 10-14 cm, patient has mild dystrophic nails on the toenails,      Labs available for review include a white blood cell count of 5.7 hemoglobin 16.8 hematocrit 49.6 glucose 96 cranial 1.14 sodium 142 AST 49 ALT 57 and a CCP negative uric acid 4.9  Lab Results   Component Value Date/Time    SODIUM 140 03/15/2013 12:30 PM    POTASSIUM 3.8 03/15/2013 12:30 PM    CHLORIDE 102 03/15/2013 12:30 PM    CO2 21 03/15/2013 12:30 PM    GLUCOSE 109 (H) 03/15/2013 12:30 PM    BUN 22 03/15/2013 12:30 PM    CREATININE 1.00 03/15/2013 12:30 PM    BUNCREATRAT 22 (H) 03/15/2013 12:30 PM    GLOMRATE >59 11/19/2010 08:43 AM      Lab Results   Component Value Date/Time    WBC 9.2 03/15/2013 12:30 PM    RBC 5.33 03/15/2013 12:30 PM    HEMOGLOBIN 15.1 03/15/2013 12:30 PM    HEMATOCRIT 44.4 03/15/2013 12:30 PM    MCV 83 03/15/2013 12:30 PM    MCH 28.3 03/15/2013 12:30 PM    MCHC 34.0 03/15/2013 12:30 PM    NEUTSPOLYS 72 03/15/2013 12:30 PM    LYMPHOCYTES 20 03/15/2013 12:30 PM    MONOCYTES 6 03/15/2013 12:30 PM    EOSINOPHILS 2 03/15/2013 12:30 PM    BASOPHILS 0 03/15/2013 12:30 PM      Lab Results   Component Value Date/Time    CALCIUM 9.5 03/15/2013 12:30 PM    ASTSGOT 33 03/15/2013 12:30 PM    ALTSGPT 39  03/15/2013 12:30 PM    ALKPHOSPHAT 119 03/15/2013 12:30 PM    TBILIRUBIN 0.4 03/15/2013 12:30 PM    ALBUMIN 4.4 03/15/2013 12:30 PM    TOTPROTEIN 6.9 03/15/2013 12:30 PM     Lab Results   Component Value Date/Time    RHEUMFACTN 8.8 03/15/2013 12:30 PM     Lab Results   Component Value Date/Time    ANADIRECT Negative 03/15/2013 12:30 PM     Lab Results   Component Value Date/Time    SEDRATEWES 19 03/15/2013 12:30 PM     Results for orders placed during the hospital encounter of 01/11/17   DX-JOINT SURVEY-HANDS SINGLE VIEW    Impression 1.  No acute fracture or bone erosion.    2.  Mild osteoarthritis.     Results for orders placed during the hospital encounter of 01/11/17   DX-JOINT SURVEY-FEET SINGLE VIEW    Impression 1.  No acute fracture or dislocation.    2.  Some soft tissue swelling is noted around the MTP joint of the fifth digit of the left foot and subtle bone erosions in the distal metatarsal are identified. Findings could be due to gout. Inflammatory arthropathy is also possible.     Results for orders placed during the hospital encounter of 12/21/07   DX-ANKLE 3+ VIEWS    Impression IMPRESSION:    SOFT TISSUE SWELLING AND EVIDENCE OF A JOINT EFFUSION WITHOUT EVIDENCE OF   ACUTE FRACTURE.      [email or web address]        Read By SABA KAUR MD on Dec 21 2007  3:35PM  : STERLING Transcription Date: Dec 22 2007  6:29PM  THIS DOCUMENT HAS BEEN ELECTRONICALLY SIGNED BY: SABA KAUR MD on   Dec 24 2007  9:07AM     DX-SACROILIAC JOINTS 3+    Impression Negative exam of the sacroiliac joints.     Results for orders placed in visit on 12/17/12   MR-KNEE-W/O    Impression 1. Advanced patellofemoral compartment osteoarthritis    2. Small medial meniscal tear the junction of the posterior horn and body.    3. Truncated medial meniscal free edge suggesting degeneration or from prior meniscectomy.    4. Very small tear of the lateral meniscus at the junction of posterior and body.    5. Joint effusion      Assessment and Plan:     1. Psoriatic arthritis (CMS-[facility])  Continue Humira 40 mg subcutaneous every 2 weeks, patient not on any other DMARD's will continue to monitor will need labs every 6 months, labs ordered for patient  - CBC WITH DIFFERENTIAL; Future  - COMP METABOLIC PANEL; Future  - WESTERGREN SED RATE; Future    2. 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 Essential hypertension  May impact the type of medications we can use for this patient's arthritis. We will have to keep this under advisement.  - CBC WITH DIFFERENTIAL; Future  - COMP METABOLIC PANEL; Future  - WESTERGREN SED RATE; Future    4. Gastroesophageal reflux disease with esophagitis  Stable  - CBC WITH DIFFERENTIAL; Future  - COMP METABOLIC PANEL; Future  - WESTERGREN SED RATE; Future    Followup: Return in about 6 months (around 7/23/2018). or sooner prn    Patient was seen 30 minutes face-to-face of which more than 50% of the time was spent counseling the patient (excluding time for procedures)  regarding  rheumatological condition and care. Therapy was discussed in detail.    Please note that this dictation was created using voice recognition software. I have made every reasonable attempt to correct obvious errors, but I expect that there are errors of grammar and possibly content that I did not discover before finalizing the note.

## 2018-01-23 NOTE — LETTER
[facility]   [address], [address]  [place] [address]  Phone: [phone]  Fax: [phone]              Encounter Date: 1/23/2018    Dear Dr. Ling ref. provider found,    It was a pleasure seeing your patient, Evens Garcia, on 1/23/2018. Diagnoses of Psoriatic arthritis (CMS-[facility]), LFT elevation, Essential hypertension, and Gastroesophageal reflux disease with esophagitis were pertinent to this visit.     Please find attached progress note which includes the history I obtained from Mr. Garcia, my physical examination findings, my impression and recommendations.      Once again, it was a pleasure participating in your patient's care.  Please feel free to contact me if you have any questions or if I can be of any further assistance to your patients.      Sincerely,    Henny Yuan M.D.  Electronically Signed          PROGRESS NOTE:  No notes on file

## 2018-01-29 DIAGNOSIS — I10 ESSENTIAL HYPERTENSION: ICD-10-CM

## 2018-01-29 DIAGNOSIS — L40.50 PSORIATIC ARTHRITIS (HCC): ICD-10-CM

## 2018-01-29 DIAGNOSIS — R79.89 ELEVATED LFTS: ICD-10-CM

## 2018-01-29 NOTE — TELEPHONE ENCOUNTER
Was the patient seen in the last year in this department? Yes  [facility]    Does patient have an active prescription for medications requested? No     Received Request Via: Pharmacy     PT MUST USE [facility] NOW.

## 2018-01-30 NOTE — TELEPHONE ENCOUNTER
Please notify patient that we have refilled her Humira that her last labs indicate elevated liver functions  She drink any wine or alcohol or take any Tylenol within 3 days adjoining the blood tests?  We need to recheck liver tests, please do not drink any alcohol or take any Tylenol within 3 days adjoining the blood tests to assure that we don't get a false positive result  If her blood tests continue to show elevated liver functions and we need to have her do an ultrasound of her liver.  Labs ordered in the computer

## 2018-01-30 NOTE — TELEPHONE ENCOUNTER
Noted  During evaluation at his last clinic visit patient stated he'll be taking this to his flight surgeon for further evaluation.  Thank you

## 2018-05-02 DIAGNOSIS — L40.50 PSORIATIC ARTHRITIS (HCC): ICD-10-CM

## 2018-05-02 DIAGNOSIS — R79.89 ELEVATED LFTS: ICD-10-CM

## 2018-05-02 DIAGNOSIS — I10 ESSENTIAL HYPERTENSION: ICD-10-CM

## 2018-05-02 NOTE — TELEPHONE ENCOUNTER
Please notify patient we received the results of your blood tests from May 1, which shows elevated liver functions  We need to recheck her liver functions, please do not drink any alcohol or take any Tylenol within 3 days of doing the blood tests as this can cause a falsely elevated lab result    Lab ordered in EPIC

## 2018-05-02 NOTE — TELEPHONE ENCOUNTER
Was the patient seen in the last year in this department? Yes  [facility]    Does patient have an active prescription for medications requested? No     Received Request Via: Pharmacy

## 2018-05-08 NOTE — TELEPHONE ENCOUNTER
Evens Chester hasn't had any lab work done since Jan. Where are you seeing labs from May? Stas stated he hasn't had any done nor did I see what you were talking about?  Please advise.  Thank you

## 2018-05-16 NOTE — TELEPHONE ENCOUNTER
Peter, looks like the labs I am referring to are from January, however, those labs do show elevated liver functions, we do need to recheck his liver functions, please have patient to the labs were ordered in Epic.  Peter and thankyou for checking:)

## 2018-06-28 ENCOUNTER — TELEPHONE (OUTPATIENT)
Dept: RHEUMATOLOGY | Facility: PHYSICIAN GROUP | Age: 58
End: 2018-06-28

## 2018-06-28 DIAGNOSIS — R79.89 ELEVATED LFTS: ICD-10-CM

## 2018-06-28 NOTE — TELEPHONE ENCOUNTER
Please notify patient that we received the results of his blood tests and his liver tests are still elevated, going to have patient do blood test to check for hepatitis and TB, I have also ordered a liver ultrasound for evaluation  Patient has no appointment July 10 with us, patient needs to get his blood tests done before the appointment, if patient can get the ultrasound done before the appointment that would be great but not necessary

## 2018-07-05 ENCOUNTER — HOSPITAL ENCOUNTER (OUTPATIENT)
Dept: RADIOLOGY | Facility: MEDICAL CENTER | Age: 58
End: 2018-07-05
Attending: INTERNAL MEDICINE
Payer: COMMERCIAL

## 2018-07-05 DIAGNOSIS — R79.89 ELEVATED LFTS: ICD-10-CM

## 2018-07-05 PROCEDURE — 76700 US EXAM ABDOM COMPLETE: CPT

## 2018-07-10 ENCOUNTER — OFFICE VISIT (OUTPATIENT)
Dept: RHEUMATOLOGY | Facility: PHYSICIAN GROUP | Age: 58
End: 2018-07-10
Payer: COMMERCIAL

## 2018-07-10 VITALS
TEMPERATURE: 97.3 F | HEART RATE: 98 BPM | BODY MASS INDEX: 29.85 KG/M2 | WEIGHT: 214 LBS | OXYGEN SATURATION: 95 % | DIASTOLIC BLOOD PRESSURE: 70 MMHG | SYSTOLIC BLOOD PRESSURE: 112 MMHG | RESPIRATION RATE: 14 BRPM

## 2018-07-10 DIAGNOSIS — K21.00 GASTROESOPHAGEAL REFLUX DISEASE WITH ESOPHAGITIS: ICD-10-CM

## 2018-07-10 DIAGNOSIS — L40.50 PSORIATIC ARTHRITIS (HCC): ICD-10-CM

## 2018-07-10 DIAGNOSIS — Z79.620 ADALIMUMAB (HUMIRA) LONG-TERM USE: ICD-10-CM

## 2018-07-10 DIAGNOSIS — K76.0 FATTY LIVER: ICD-10-CM

## 2018-07-10 DIAGNOSIS — I10 ESSENTIAL HYPERTENSION: ICD-10-CM

## 2018-07-10 PROCEDURE — 99214 OFFICE O/P EST MOD 30 MIN: CPT | Performed by: INTERNAL MEDICINE

## 2018-07-10 NOTE — PROGRESS NOTES
Chief Complaint- joint pain    Subjective:   Evens Garcia is a 58 y.o. male here today for follow up of rheumatological issues    This is a follow-up visit for this patient who we see for psoriatic arthritis initially diagnosed about 2013 currently on Humira 40 mg subcu every 2 weeks and doing quite well.  Other issues is that patient has had problems with elevated liver functions status post liver ultrasound indicating fatty liver and all other serologies negative i.e. negative viral serologies negative autoimmune serologies.  Status post evaluation by his flight surgeon who states they are not worried about his liver.  Patient denies any side effects from the medication, denies any unexplained weight loss, denies any fevers of unknown etiology, denies any GI upset, denies any rashes, denies any new joint swelling, denies recurrent infections.  Of note recent ESR equals 4.        S/p MTX-nausea, elevated LFTs  S/p sulfasalazine-no benefit     Anti-actin ab neg 7/2018-[facility]  Mitochondrial ab neg 7/2018 [facility]  CCP neg 1/2018 [facility]  RF neg 3/2013  TINA neg 3/2013  Uric acid 4.9 1/2018 [facility]  Quantiferon Gold neg 2/2017 [facility]; Quantiferon Gold neg 7/2018 [facility]  Hep B neg 2/2017 [facility]; HBsAg/HBcAb neg 7/2018 [facility]  HCV neg 7/2018 [facility]  Feet x-rays 1/2017-indicates erosive arthritis on the left fifth MTP joint  Hand x-rays 1/2017-indicates DJD  SI joint x-rays 1/2017-no pathology  Liver ultrasound 7/2018-indicates fatty liver          Current medicines (including changes today)  Current Outpatient Prescriptions   Medication Sig Dispense Refill   • Adalimumab 40 MG/0.8ML Pen-injector Kit Inject 0.8 mL as instructed every 14 days. 6 Each 0   • omeprazole (PRILOSEC) 20 MG delayed-release capsule Take 1 Cap by mouth every day. 90 Cap 3   • Naproxen Sodium (ALEVE) 220 MG Cap Take  by mouth.     • fluocinonide (LIDEX) 0.05 % Cream Apply thin amount bid to rash prn 60 g 1   • naproxen (NAPROSYN) 500 MG  Tab 1 tab po bid prn joint pain, take with food 180 Tab 0   • ibuprofen (MOTRIN) 200 MG TABS Take 200 mg by mouth every 6 hours as needed. Takes two prn        No current facility-administered medications for this visit.      He  has no past medical history on file.    ROS   Other than what is mentioned in HPI or physical exam, there is no history of headaches, double vision or blurred vision. No temporal tenderness or jaw claudication. No history of cataracts or glaucoma. No trouble swallowing difficulties or sore throats.  No chest complaints including chest pain, cough or sputum production. No shortness of breath. No GI complaints including nausea, vomiting, change in bowel habits, or past peptic ulcer disease. No history of blood in the stools. No urinary complaints including dysuria or frequency. No history of alopecia, photosensitivity, oral ulcerations, Raynaud's phenomena.       Objective:     Blood pressure 112/70, pulse 98, temperature 36.3 °C (97.3 °F), resp. rate 14, weight 97.1 kg (214 lb), SpO2 95 %. Body mass index is 29.85 kg/m².   Physical Exam:    Constitutional: Alert and oriented X3, no distress, patient is talkative and smiling with good eye contact.Skin: Warm, dry, good turgor, patient does have patches of dry skin on the knuckles of both hands, normal psoriatic plaque on his forehead has resolved today..Eye: Equal, round and reactive, conjunctiva clear, lids normal EOM intactENMT: Lips without lesions, good dentition, no oropharyngeal ulcers, moist buccal mucosa, pinna without deformityNeck: Trachea midline, no masses, no thyromegaly.Lymph:  No cervical lymphadenopathy, no axillary lymphadenopathy, no supraclavicular lymphadenopathyRespiratory: Unlabored respiratory effort, lungs clear to auscultation, no wheezes, no ronchi.Cardiovascular: Normal S1, S2, no murmur, no edema.Abdomen: Soft, non-tender, no masses, no hepatosplenomegaly.Psych: Alert and oriented x3, normal affect and mood.Neuro:  Cranial nerves 2-12 are grossly intact, no loss of sensation LEExt:no joint laxity noted in bilateral arms, no joint laxity noted in bilateral legs, knees with crepitus but no effusions, no sausage digits, no dactylitis, no Achilles inflammation, occiput to wall 0 cm, gait without antalgia and without foot drop, patient has mild dystrophic nails on the toenails    Lab Results   Component Value Date/Time    SODIUM 140 03/15/2013 12:30 PM    POTASSIUM 3.8 03/15/2013 12:30 PM    CHLORIDE 102 03/15/2013 12:30 PM    CO2 21 03/15/2013 12:30 PM    GLUCOSE 109 (H) 03/15/2013 12:30 PM    BUN 22 03/15/2013 12:30 PM    CREATININE 1.00 03/15/2013 12:30 PM    BUNCREATRAT 22 (H) 03/15/2013 12:30 PM    GLOMRATE >59 11/19/2010 08:43 AM      Lab Results   Component Value Date/Time    WBC 9.2 03/15/2013 12:30 PM    RBC 5.33 03/15/2013 12:30 PM    HEMOGLOBIN 15.1 03/15/2013 12:30 PM    HEMATOCRIT 44.4 03/15/2013 12:30 PM    MCV 83 03/15/2013 12:30 PM    MCH 28.3 03/15/2013 12:30 PM    MCHC 34.0 03/15/2013 12:30 PM    NEUTSPOLYS 72 03/15/2013 12:30 PM    LYMPHOCYTES 20 03/15/2013 12:30 PM    MONOCYTES 6 03/15/2013 12:30 PM    EOSINOPHILS 2 03/15/2013 12:30 PM    BASOPHILS 0 03/15/2013 12:30 PM      Lab Results   Component Value Date/Time    CALCIUM 9.5 03/15/2013 12:30 PM    ASTSGOT 33 03/15/2013 12:30 PM    ALTSGPT 39 03/15/2013 12:30 PM    ALKPHOSPHAT 119 03/15/2013 12:30 PM    TBILIRUBIN 0.4 03/15/2013 12:30 PM    ALBUMIN 4.4 03/15/2013 12:30 PM    TOTPROTEIN 6.9 03/15/2013 12:30 PM     Lab Results   Component Value Date/Time    RHEUMFACTN 8.8 03/15/2013 12:30 PM     Lab Results   Component Value Date/Time    ANADIRECT Negative 03/15/2013 12:30 PM     Lab Results   Component Value Date/Time    SEDRATEWES 19 03/15/2013 12:30 PM     Results for orders placed during the hospital encounter of 01/11/17   DX-JOINT SURVEY-HANDS SINGLE VIEW    Impression 1.  No acute fracture or bone erosion.    2.  Mild osteoarthritis.     Results for  orders placed during the hospital encounter of 01/11/17   DX-JOINT SURVEY-FEET SINGLE VIEW    Impression 1.  No acute fracture or dislocation.    2.  Some soft tissue swelling is noted around the MTP joint of the fifth digit of the left foot and subtle bone erosions in the distal metatarsal are identified. Findings could be due to gout. Inflammatory arthropathy is also possible.     Results for orders placed during the hospital encounter of 12/21/07   DX-ANKLE 3+ VIEWS    Impression IMPRESSION:    SOFT TISSUE SWELLING AND EVIDENCE OF A JOINT EFFUSION WITHOUT EVIDENCE OF   ACUTE FRACTURE.      [email or web address]        Read By SABA KAUR MD on Dec 21 2007  3:35PM  : LVD Transcription Date: Dec 22 2007  6:29PM  THIS DOCUMENT HAS BEEN ELECTRONICALLY SIGNED BY: SABA KAUR MD on   Dec 24 2007  9:07AM        Results for orders placed during the hospital encounter of 01/11/17   DX-SACROILIAC JOINTS 3+    Impression Negative exam of the sacroiliac joints.     Results for orders placed in visit on 12/17/12   MR-KNEE-W/O    Impression 1. Advanced patellofemoral compartment osteoarthritis    2. Small medial meniscal tear the junction of the posterior horn and body.    3. Truncated medial meniscal free edge suggesting degeneration or from prior meniscectomy.    4. Very small tear of the lateral meniscus at the junction of posterior and body.    5. Joint effusion       Assessment and Plan:     1. Psoriatic arthritis (HCC)  Continue Humira 40 mg subcu every 2 weeks doing quite well with such  Patient will need labs every 6 months, next labs due December 2018.  - Adalimumab 40 MG/0.8ML Pen-injector Kit; Inject 0.8 mL as instructed every 14 days.  Dispense: 6 Each; Refill: 0  - CBC WITH DIFFERENTIAL; Future  - COMP METABOLIC PANEL; Future  - WESTERGREN SED RATE; Future    2. Adalimumab (Humira) long-term use  Of note screening labs are up-to-date  Patient will need monitoring labs every 6 months, next monitoring  labs due December 2018  - Adalimumab 40 MG/0.8ML Pen-injector Kit; Inject 0.8 mL as instructed every 14 days.  Dispense: 6 Each; Refill: 0  - CBC WITH DIFFERENTIAL; Future  - COMP METABOLIC PANEL; Future  - WESTERGREN SED RATE; Future    3. Fatty liver  As seen on ultrasound, viral and autoimmune serologies are negative  Followed by his flight surgeon  - CBC WITH DIFFERENTIAL; Future  - COMP METABOLIC PANEL; Future  - WESTERGREN SED RATE; Future    4. Gastroesophageal reflux disease with esophagitis  May impact the type of medications we can use for this patient's arthritis. We will have to keep this under advisement.    5. Essential hypertension  May impact the type of medications we can use for this patient's arthritis. We will have to keep this under advisement.  - Adalimumab 40 MG/0.8ML Pen-injector Kit; Inject 0.8 mL as instructed every 14 days.  Dispense: 6 Each; Refill: 0    Followup: Return in about 6 months (around 1/10/2019). or sooner abdoul Garcia was seen 30 minutes face-to-face of which more than 50% of the time was spent counseling the patient (excluding time for procedures)  regarding  rheumatological condition and care. Therapy was discussed in detail.    Please note that this dictation was created using voice recognition software. I have made every reasonable attempt to correct obvious errors, but I expect that there are errors of grammar and possibly content that I did not discover before finalizing the note.

## 2018-07-10 NOTE — LETTER
[facility]   [address], [address]  [place] [address]  Phone: [phone]  Fax: [phone]              Encounter Date: 7/10/2018    Dear Dr. Ling ref. provider found,    It was a pleasure seeing your patient, Evens aGrcia, on 7/10/2018. Diagnoses of Psoriatic arthritis (HCC), Adalimumab (Humira) long-term use, Fatty liver, Gastroesophageal reflux disease with esophagitis, and Essential hypertension were pertinent to this visit.     Please find attached progress note which includes the history I obtained from Mr. Garcia, my physical examination findings, my impression and recommendations.      Once again, it was a pleasure participating in your patient's care.  Please feel free to contact me if you have any questions or if I can be of any further assistance to your patients.      Sincerely,    Henny Yuan M.D.  Electronically Signed          PROGRESS NOTE:  No notes on file

## 2019-01-15 DIAGNOSIS — I10 ESSENTIAL HYPERTENSION: ICD-10-CM

## 2019-01-15 DIAGNOSIS — Z79.620 ADALIMUMAB (HUMIRA) LONG-TERM USE: ICD-10-CM

## 2019-01-15 DIAGNOSIS — L40.50 PSORIATIC ARTHRITIS (HCC): ICD-10-CM

## 2019-01-15 NOTE — TELEPHONE ENCOUNTER
Was the patient seen in the last year in this department? Yes  [facility]  Does patient have an active prescription for medications requested? No     Received Request Via: Pharmacy  \

## 2019-01-21 ENCOUNTER — OFFICE VISIT (OUTPATIENT)
Dept: RHEUMATOLOGY | Facility: MEDICAL CENTER | Age: 59
End: 2019-01-21
Payer: COMMERCIAL

## 2019-01-21 VITALS
WEIGHT: 219 LBS | DIASTOLIC BLOOD PRESSURE: 60 MMHG | RESPIRATION RATE: 14 BRPM | BODY MASS INDEX: 30.54 KG/M2 | OXYGEN SATURATION: 94 % | SYSTOLIC BLOOD PRESSURE: 120 MMHG | HEART RATE: 86 BPM | TEMPERATURE: 98.6 F

## 2019-01-21 DIAGNOSIS — L40.50 PSORIATIC ARTHRITIS (HCC): ICD-10-CM

## 2019-01-21 DIAGNOSIS — K76.0 FATTY LIVER: ICD-10-CM

## 2019-01-21 DIAGNOSIS — G89.29 CHRONIC PAIN OF LEFT KNEE: ICD-10-CM

## 2019-01-21 DIAGNOSIS — M25.562 CHRONIC PAIN OF LEFT KNEE: ICD-10-CM

## 2019-01-21 DIAGNOSIS — Z79.620 ADALIMUMAB (HUMIRA) LONG-TERM USE: ICD-10-CM

## 2019-01-21 DIAGNOSIS — I10 ESSENTIAL HYPERTENSION: ICD-10-CM

## 2019-01-21 PROCEDURE — 99214 OFFICE O/P EST MOD 30 MIN: CPT | Performed by: INTERNAL MEDICINE

## 2019-01-21 NOTE — LETTER
[facility]   [address] [place] [address], [address]  [place] [address]  Phone: [phone]  Fax: [phone]              Encounter Date: 1/21/2019    Dear Dr. Ling ref. provider found,    It was a pleasure seeing your patient, Evens Garcia, on 1/21/2019. Diagnoses of Psoriatic arthritis (HCC), Adalimumab (Humira) long-term use, Chronic pain of left knee, Fatty liver, and Essential hypertension were pertinent to this visit.     Please find attached progress note which includes the history I obtained from Mr. Garcia, my physical examination findings, my impression and recommendations.      Once again, it was a pleasure participating in your patient's care.  Please feel free to contact me if you have any questions or if I can be of any further assistance to your patients.      Sincerely,    Henny Yuan M.D.  Electronically Signed          PROGRESS NOTE:  No notes on file

## 2019-01-22 NOTE — PROGRESS NOTES
Chief Complaint- joint pain    Subjective:   Evens Garcia is a 58 y.o. male here today for follow up of rheumatological issues    This is a follow-up visit for this patient who we see for psoriatic arthritis initially diagnosed about 2013 currently on Humira 40 mg subcu every 2 weeks and really doing quite well.  Patient denies any side effects from the medication, denies any unexplained weight loss, denies any fevers of unknown etiology, denies any GI upset, denies any rashes, denies any new joint swelling, denies recurrent infections.  Of note recent sedimentation rate equals 7 in January 2019.    Other issues is that patient has had problems with elevated liver functions status post liver ultrasound indicating fatty liver and all other serologies negative i.e. negative viral serologies negative autoimmune serologies.  Status post evaluation by his flight surgeon who states they are not worried about his liver.    Additional comorbidity includes chronic left knee pain status post skiing accident with a meniscal injury status post rehab and patient is going to be trying to ski again this year.     S/p MTX-nausea, elevated LFTs  S/p sulfasalazine-no benefit     Anti-actin ab neg 7/2018-[facility]  Mitochondrial ab neg 7/2018 [facility]  CCP neg 1/2018 [facility]  RF neg 3/2013  TINA neg 3/2013  Uric acid 4.9 1/2018 [facility]  Quantiferon Gold neg 7/2018 [facility]  HBsAg/HBcAb neg 7/2018 [facility]  HCV neg 7/2018 [facility]  Feet x-rays 1/2017-indicates erosive arthritis on the left fifth MTP joint  Hand x-rays 1/2017-indicates DJD  SI joint x-rays 1/2017-no pathology  Liver ultrasound 7/2018-indicates fatty liver            Current medicines (including changes today)  Current Outpatient Prescriptions   Medication Sig Dispense Refill   • Adalimumab (HUMIRA PEN) 40 MG/0.8ML Pen-injector Kit 0.8 mL by Intramuscular route every 14 days. 6 Each 1   • omeprazole (PRILOSEC) 20 MG delayed-release capsule Take 1 Cap by mouth every day.  90 Cap 3   • Naproxen Sodium (ALEVE) 220 MG Cap Take  by mouth.     • fluocinonide (LIDEX) 0.05 % Cream Apply thin amount bid to rash prn (Patient not taking: Reported on 1/21/2019) 60 g 1   • naproxen (NAPROSYN) 500 MG Tab 1 tab po bid prn joint pain, take with food (Patient not taking: Reported on 1/21/2019) 180 Tab 0   • ibuprofen (MOTRIN) 200 MG TABS Take 200 mg by mouth every 6 hours as needed. Takes two prn        No current facility-administered medications for this visit.      He  has no past medical history on file.    ROS   Other than what is mentioned in HPI or physical exam, there is no history of headaches, double vision or blurred vision. No temporal tenderness or jaw claudication. No history of cataracts or glaucoma. No trouble swallowing difficulties or sore throats.  No chest complaints including chest pain, cough or sputum production. No GI complaints including nausea, vomiting, change in bowel habits, or past peptic ulcer disease. No history of blood in the stools. No urinary complaints including dysuria or frequency. No history of alopecia, photosensitivity, oral ulcerations, Raynaud's phenomena.       Objective:     Blood pressure 120/60, pulse 86, temperature 37 °C (98.6 °F), temperature source Temporal, resp. rate 14, weight 99.3 kg (219 lb), SpO2 94 %. Body mass index is 30.54 kg/m².   Physical Exam:    Constitutional: Alert and oriented X3, patient is talkative with good eye contact.Skin: Warm, dry, good turgor, patient does have a small patch of psoriasis on the right temporal area in the scalp.Eye: Equal, round and reactive, conjunctiva clear, lids normal EOM intactENMT: Lips without lesions, good dentition, no oropharyngeal ulcers, moist buccal mucosa, pinna without deformityNeck: Trachea midline, no masses, no thyromegaly.Lymph:  No cervical lymphadenopathy, no axillary lymphadenopathy, no supraclavicular lymphadenopathyRespiratory: Unlabored respiratory effort, lungs clear to  auscultation, no wheezes, no ronchi.Cardiovascular: Normal S1, S2, no murmur, no edema.Abdomen: Soft, non-tender, no masses, no hepatosplenomegaly.Psych: Alert and oriented x3, normal affect and mood.Neuro: Cranial nerves 2-12 are grossly intact, no loss of sensation LEExt:no joint laxity noted in bilateral arms, no joint laxity noted in bilateral legs, joints look really quite good no evidence of swan-neck or boutonniere deformities no dactylitis no sausage digits fingers without deformity toes without crossover toes and without splay toes, gait without antalgia and without foot drop, no sacroiliac tenderness palpation, occiput wall 0 cm    Lab Results   Component Value Date/Time    SODIUM 140 03/15/2013 12:30 PM    POTASSIUM 3.8 03/15/2013 12:30 PM    CHLORIDE 102 03/15/2013 12:30 PM    CO2 21 03/15/2013 12:30 PM    GLUCOSE 109 (H) 03/15/2013 12:30 PM    BUN 22 03/15/2013 12:30 PM    CREATININE 1.00 03/15/2013 12:30 PM    BUNCREATRAT 22 (H) 03/15/2013 12:30 PM    GLOMRATE >59 11/19/2010 08:43 AM      Lab Results   Component Value Date/Time    WBC 9.2 03/15/2013 12:30 PM    RBC 5.33 03/15/2013 12:30 PM    HEMOGLOBIN 15.1 03/15/2013 12:30 PM    HEMATOCRIT 44.4 03/15/2013 12:30 PM    MCV 83 03/15/2013 12:30 PM    MCH 28.3 03/15/2013 12:30 PM    MCHC 34.0 03/15/2013 12:30 PM    NEUTSPOLYS 72 03/15/2013 12:30 PM    LYMPHOCYTES 20 03/15/2013 12:30 PM    MONOCYTES 6 03/15/2013 12:30 PM    EOSINOPHILS 2 03/15/2013 12:30 PM    BASOPHILS 0 03/15/2013 12:30 PM      Lab Results   Component Value Date/Time    CALCIUM 9.5 03/15/2013 12:30 PM    ASTSGOT 33 03/15/2013 12:30 PM    ALTSGPT 39 03/15/2013 12:30 PM    ALKPHOSPHAT 119 03/15/2013 12:30 PM    TBILIRUBIN 0.4 03/15/2013 12:30 PM    ALBUMIN 4.4 03/15/2013 12:30 PM    TOTPROTEIN 6.9 03/15/2013 12:30 PM     Lab Results   Component Value Date/Time    RHEUMFACTN 8.8 03/15/2013 12:30 PM     Lab Results   Component Value Date/Time    ANADIRECT Negative 03/15/2013 12:30 PM     Lab  Results   Component Value Date/Time    SEDRATEWES 19 03/15/2013 12:30 PM     Results for orders placed during the hospital encounter of 01/11/17   DX-JOINT SURVEY-HANDS SINGLE VIEW    Impression 1.  No acute fracture or bone erosion.    2.  Mild osteoarthritis.     Results for orders placed during the hospital encounter of 01/11/17   DX-JOINT SURVEY-FEET SINGLE VIEW    Impression 1.  No acute fracture or dislocation.    2.  Some soft tissue swelling is noted around the MTP joint of the fifth digit of the left foot and subtle bone erosions in the distal metatarsal are identified. Findings could be due to gout. Inflammatory arthropathy is also possible.     Results for orders placed during the hospital encounter of 12/21/07   DX-ANKLE 3+ VIEWS    Impression IMPRESSION:    SOFT TISSUE SWELLING AND EVIDENCE OF A JOINT EFFUSION WITHOUT EVIDENCE OF   ACUTE FRACTURE.      [email or web address]        Read By SABA KAUR MD on Dec 21 2007  3:35PM  : LVDION Transcription Date: Dec 22 2007  6:29PM  THIS DOCUMENT HAS BEEN ELECTRONICALLY SIGNED BY: SABA KAUR MD on   Dec 24 2007  9:07AM     Results for orders placed during the hospital encounter of 01/11/17   DX-SACROILIAC JOINTS 3+    Impression Negative exam of the sacroiliac joints.     Results for orders placed in visit on 12/17/12   MR-KNEE-W/O    Impression 1. Advanced patellofemoral compartment osteoarthritis    2. Small medial meniscal tear the junction of the posterior horn and body.    3. Truncated medial meniscal free edge suggesting degeneration or from prior meniscectomy.    4. Very small tear of the lateral meniscus at the junction of posterior and body.    5. Joint effusion     Assessment and Plan:     1. Psoriatic arthritis (HCC)  Extremely stable on Humira at 40 mg subcu every 2 weeks, we will continue with such  - COMP METABOLIC PANEL; Future  - CBC WITH DIFFERENTIAL; Future  - WESTERGREN SED RATE; Future    2. Adalimumab (Humira) long-term use  Of  note screening labs are up-to-date patient will need monitoring labs every 6 months next monitoring labs will be due August 2019  - COMP METABOLIC PANEL; Future  - CBC WITH DIFFERENTIAL; Future  - WESTERGREN SED RATE; Future    3. Chronic pain of left knee  Status post meniscal injury from skiing accident status post rehab now doing well uses a brace and will be seen again this year  - COMP METABOLIC PANEL; Future  - CBC WITH DIFFERENTIAL; Future  - WESTERGREN SED RATE; Future    4. Fatty liver  May impact the type of medications we can use for this patient's arthritis. We will have to keep this under advisement.  - COMP METABOLIC PANEL; Future  - CBC WITH DIFFERENTIAL; Future  - WESTERGREN SED RATE; Future    5. Essential hypertension  May impact the type of medications we can use for this patient's arthritis. We will have to keep this under advisement.    Followup: Return in about 6 months (around 7/21/2019). or sooner abdoul Garcia was seen 30 minutes face-to-face of which more than 50% of the time was spent counseling the patient (excluding time for procedures)  regarding  rheumatological condition and care. Therapy was discussed in detail.    Please note that this dictation was created using voice recognition software. I have made every reasonable attempt to correct obvious errors, but I expect that there are errors of grammar and possibly content that I did not discover before finalizing the note.

## 2019-02-21 DIAGNOSIS — I10 ESSENTIAL HYPERTENSION: ICD-10-CM

## 2019-02-21 DIAGNOSIS — L40.50 PSORIATIC ARTHRITIS (HCC): ICD-10-CM

## 2019-02-21 DIAGNOSIS — Z79.620 ADALIMUMAB (HUMIRA) LONG-TERM USE: ICD-10-CM

## 2019-02-21 NOTE — TELEPHONE ENCOUNTER
Was the patient seen in the last year in this department? Yes  [facility] in media  Does patient have an active prescription for medications requested? No     Received Request Via: Patient      PTS INS CHANGED AND HE NEEDS TO USE A NEW MAIL ORDER PHARMACY. PLEASE SEND NEW SCRIPT TO [facility]. THANK YOU

## 2019-07-22 ENCOUNTER — OFFICE VISIT (OUTPATIENT)
Dept: RHEUMATOLOGY | Facility: MEDICAL CENTER | Age: 59
End: 2019-07-22
Payer: COMMERCIAL

## 2019-07-22 VITALS
SYSTOLIC BLOOD PRESSURE: 120 MMHG | BODY MASS INDEX: 30.23 KG/M2 | HEART RATE: 90 BPM | TEMPERATURE: 97.4 F | DIASTOLIC BLOOD PRESSURE: 72 MMHG | OXYGEN SATURATION: 95 % | RESPIRATION RATE: 14 BRPM | WEIGHT: 216.71 LBS

## 2019-07-22 DIAGNOSIS — I10 ESSENTIAL HYPERTENSION: ICD-10-CM

## 2019-07-22 DIAGNOSIS — L40.50 PSORIATIC ARTHRITIS (HCC): ICD-10-CM

## 2019-07-22 DIAGNOSIS — K76.0 FATTY LIVER: ICD-10-CM

## 2019-07-22 DIAGNOSIS — Z79.620 ADALIMUMAB (HUMIRA) LONG-TERM USE: ICD-10-CM

## 2019-07-22 PROCEDURE — 99214 OFFICE O/P EST MOD 30 MIN: CPT | Performed by: INTERNAL MEDICINE

## 2019-07-22 NOTE — PROGRESS NOTES
Chief Complaint- joint pain     Subjective:   Evens Garcia is a 59 y.o. male here today for follow up of rheumatological issues    This is a follow-up visit for this patient who is seen in this clinic for psoriatic arthritis initially diagnosed about 2013.  Patient is currently on Humira 40 mg subcu every 2 weeks and really doing quite well.since last visit patient developed a sore throat and had to go off Humira for about 4 weeks and states that in spite of being off of Humira for 4 weeks he really did not feel much of a change and wonders about possibly decreasing the dose of the Humira.   Patient denies any side effects from the medication, denies any unexplained weight loss, denies any fevers of unknown etiology, denies any GI upset, denies any rashes, denies any new joint swelling, denies recurrent infections.  Of note recent sedimentation rate equals 6 July 2019 from [facility].     Other issues is that patient has had problems with elevated liver functions status post liver ultrasound indicating fatty liver and all other serologies negative i.e. negative viral serologies negative autoimmune serologies.  Status post evaluation by his flight surgeon who states they are not worried about his liver.    Additional comorbidity includes chronic left knee pain status post skiing accident with a meniscal injury status post rehab and patient is going to be trying to ski again this year.     S/p MTX-nausea, elevated LFTs  S/p sulfasalazine-no benefit     Anti-actin ab neg 7/2018-[facility]  Mitochondrial ab neg 7/2018 [facility]  CCP neg 1/2018 [facility]  RF neg 3/2013  TINA neg 3/2013  Uric acid 4.9 1/2018 [facility]  Quantiferon Gold neg 7/2018 [facility]  HBsAg/HBcAb neg 7/2018 [facility]  HCV neg 7/2018 [facility]  Feet x-rays 1/2017-indicates erosive arthritis on the left fifth MTP joint  Hand x-rays 1/2017-indicates DJD  SI joint x-rays 1/2017-no pathology  Liver ultrasound 7/2018-indicates fatty liver              Current  medicines (including changes today)  Current Outpatient Prescriptions   Medication Sig Dispense Refill   • Adalimumab (HUMIRA PEN) 40 MG/0.8ML Pen-injector Kit 0.8 mL by Intramuscular route every 14 days. 6 Each 1   • omeprazole (PRILOSEC) 20 MG delayed-release capsule Take 1 Cap by mouth every day. 90 Cap 3   • Naproxen Sodium (ALEVE) 220 MG Cap Take  by mouth.     • ibuprofen (MOTRIN) 200 MG TABS Take 200 mg by mouth every 6 hours as needed. Takes two prn        No current facility-administered medications for this visit.      He  has no past medical history on file.    ROS   Other than what is mentioned in HPI or physical exam, there is no history of headaches, double vision or blurred vision. No temporal tenderness or jaw claudication. No history of cataracts or glaucoma. No trouble swallowing difficulties or sore throats.  No chest complaints including chest pain, cough or sputum production. No GI complaints including nausea, vomiting, change in bowel habits, or past peptic ulcer disease. No history of blood in the stools. No urinary complaints including dysuria or frequency. No history of alopecia, photosensitivity, oral ulcerations, Raynaud's phenomena.       Objective:     /72   Pulse 90   Temp 36.3 °C (97.4 °F) (Temporal)   Resp 14   Wt 98.3 kg (216 lb 11.4 oz)   SpO2 95%  Body mass index is 30.23 kg/m².   Physical Exam:    Constitutional: Alert and oriented X3, patient is talkative with good eye contact.Skin: Warm, dry, good turgor, patient does have a small psoriatic patch along the hairline right upper scalp area.Eye: Equal, round and reactive, conjunctiva clear, lids normal EOM intactENMT: Lips without lesions, good dentition, no oropharyngeal ulcers, moist buccal mucosa, pinna without deformityNeck: Trachea midline, no masses, no thyromegaly.Lymph:  No cervical lymphadenopathy, no axillary lymphadenopathy, no supraclavicular lymphadenopathyRespiratory: Unlabored respiratory effort, lungs  clear to auscultation, no wheezes, no ronchi.Cardiovascular: Normal S1, S2, no murmur, no edema.Abdomen: Soft, non-tender, no masses, no hepatosplenomegaly.Psych: Alert and oriented x3, normal affect and mood.Neuro: Cranial nerves 2-12 are grossly intact, no loss of sensation LEExt:no joint laxity noted in bilateral arms, no joint laxity noted in bilateral legs, joints without any dactylitis no deformities no enthesitis no Achilles inflammation toes without crossover toes without splay toes    Lab Results   Component Value Date/Time    SODIUM 140 03/15/2013 12:30 PM    POTASSIUM 3.8 03/15/2013 12:30 PM    CHLORIDE 102 03/15/2013 12:30 PM    CO2 21 03/15/2013 12:30 PM    GLUCOSE 109 (H) 03/15/2013 12:30 PM    BUN 22 03/15/2013 12:30 PM    CREATININE 1.00 03/15/2013 12:30 PM    BUNCREATRAT 22 (H) 03/15/2013 12:30 PM    GLOMRATE >59 11/19/2010 08:43 AM      Lab Results   Component Value Date/Time    WBC 9.2 03/15/2013 12:30 PM    RBC 5.33 03/15/2013 12:30 PM    HEMOGLOBIN 15.1 03/15/2013 12:30 PM    HEMATOCRIT 44.4 03/15/2013 12:30 PM    MCV 83 03/15/2013 12:30 PM    MCH 28.3 03/15/2013 12:30 PM    MCHC 34.0 03/15/2013 12:30 PM    NEUTSPOLYS 72 03/15/2013 12:30 PM    LYMPHOCYTES 20 03/15/2013 12:30 PM    MONOCYTES 6 03/15/2013 12:30 PM    EOSINOPHILS 2 03/15/2013 12:30 PM    BASOPHILS 0 03/15/2013 12:30 PM      Lab Results   Component Value Date/Time    CALCIUM 9.5 03/15/2013 12:30 PM    ASTSGOT 33 03/15/2013 12:30 PM    ALTSGPT 39 03/15/2013 12:30 PM    ALKPHOSPHAT 119 03/15/2013 12:30 PM    TBILIRUBIN 0.4 03/15/2013 12:30 PM    ALBUMIN 4.4 03/15/2013 12:30 PM    TOTPROTEIN 6.9 03/15/2013 12:30 PM     Lab Results   Component Value Date/Time    RHEUMFACTN 8.8 03/15/2013 12:30 PM     Lab Results   Component Value Date/Time    ANADIRECT Negative 03/15/2013 12:30 PM     Lab Results   Component Value Date/Time    SEDRATEWES 19 03/15/2013 12:30 PM     Results for orders placed during the hospital encounter of 01/11/17    DX-JOINT SURVEY-HANDS SINGLE VIEW    Impression 1.  No acute fracture or bone erosion.    2.  Mild osteoarthritis.     Results for orders placed during the hospital encounter of 01/11/17   DX-JOINT SURVEY-FEET SINGLE VIEW    Impression 1.  No acute fracture or dislocation.    2.  Some soft tissue swelling is noted around the MTP joint of the fifth digit of the left foot and subtle bone erosions in the distal metatarsal are identified. Findings could be due to gout. Inflammatory arthropathy is also possible.     Results for orders placed during the hospital encounter of 12/21/07   DX-ANKLE 3+ VIEWS    Impression IMPRESSION:    SOFT TISSUE SWELLING AND EVIDENCE OF A JOINT EFFUSION WITHOUT EVIDENCE OF   ACUTE FRACTURE.      [email or web address]        Read By SABA KAUR MD on Dec 21 2007  3:35PM  : [place] Transcription Date: Dec 22 2007  6:29PM  THIS DOCUMENT HAS BEEN ELECTRONICALLY SIGNED BY: SABA KAUR MD on   Dec 24 2007  9:07AM     Results for orders placed during the hospital encounter of 01/11/17   DX-SACROILIAC JOINTS 3+    Impression Negative exam of the sacroiliac joints.     Results for orders placed in visit on 12/17/12   MR-KNEE-W/O    Impression 1. Advanced patellofemoral compartment osteoarthritis    2. Small medial meniscal tear the junction of the posterior horn and body.    3. Truncated medial meniscal free edge suggesting degeneration or from prior meniscectomy.    4. Very small tear of the lateral meniscus at the junction of posterior and body.    5. Joint effusion     Assessment and Plan:     1. Psoriatic arthritis (HCC)  Doing quite well on Humira 40 mg subcu every 2 weeks of note patient did not feel much of a difference being off Humira for 4 weeks we opted to try decreasing Humira 40 mg every 3 weeks and possibly to every 4 weeks, we may be able to wean patient off eventually.  - Comp Metabolic Panel; Future  - CREATINE KINASE; Future  - CBC WITH DIFFERENTIAL; Future  -  WESTERGREN SED RATE; Future  - VITAMIN D,25 HYDROXY; Future    2. Adalimumab (Humira) long-term use  Decrease Humira from 40 mg every 2 weeks to 40 mg every 3 weeks.  Patient needs monitoring labs every 6 months, next monitoring labs to be due January 2019  Of note screening labs are up-to-date  - Comp Metabolic Panel; Future  - CREATINE KINASE; Future  - CBC WITH DIFFERENTIAL; Future  - WESTERGREN SED RATE; Future  - VITAMIN D,25 HYDROXY; Future    3. Essential hypertension  May impact the type of medications we can use for this patient's arthritis. We will have to keep this under advisement.  - Comp Metabolic Panel; Future  - CREATINE KINASE; Future  - CBC WITH DIFFERENTIAL; Future  - WESTERGREN SED RATE; Future  - VITAMIN D,25 HYDROXY; Future    4. Fatty liver  Seen by ultrasound, liver functions very but remained stable within the variation    Followup: Return in about 6 months (around 1/22/2020). or sooner abdoul Garcia  was seen 30 minutes face-to-face of which more than 50% of the time was spent counseling the patient (excluding time for procedures)  regarding  rheumatological condition and care. Therapy was discussed in detail.      Please note that this dictation was created using voice recognition software. I have made every reasonable attempt to correct obvious errors, but I expect that there are errors of grammar and possibly content that I did not discover before finalizing the note.

## 2019-07-22 NOTE — LETTER
[facility]   [address] [place] [address], [address]  [place] [address]  Phone: [phone]  Fax: [phone]              Encounter Date: 7/22/2019    Dear Dr. Ling ref. provider found,    It was a pleasure seeing your patient, Evens Garcia, on 7/22/2019. Diagnoses of Psoriatic arthritis (HCC), Adalimumab (Humira) long-term use, Essential hypertension, and Fatty liver were pertinent to this visit.     Please find attached progress note which includes the history I obtained from Mr. Garcia, my physical examination findings, my impression and recommendations.      Once again, it was a pleasure participating in your patient's care.  Please feel free to contact me if you have any questions or if I can be of any further assistance to your patients.      Sincerely,    Henny Yuan M.D.  Electronically Signed          PROGRESS NOTE:  No notes on file

## 2019-09-16 DIAGNOSIS — L40.50 PSORIATIC ARTHRITIS (HCC): ICD-10-CM

## 2019-09-16 DIAGNOSIS — Z79.620 ADALIMUMAB (HUMIRA) LONG-TERM USE: ICD-10-CM

## 2019-09-16 DIAGNOSIS — I10 ESSENTIAL HYPERTENSION: ICD-10-CM

## 2019-09-16 NOTE — TELEPHONE ENCOUNTER
Was the patient seen in the last year in this department? Yes  July Labs in media  Does patient have an active prescription for medications requested? No     Received Request Via: Pharmacy

## 2019-12-04 DIAGNOSIS — L40.50 PSORIATIC ARTHRITIS (HCC): ICD-10-CM

## 2020-01-27 ENCOUNTER — OFFICE VISIT (OUTPATIENT)
Dept: RHEUMATOLOGY | Facility: MEDICAL CENTER | Age: 60
End: 2020-01-27
Payer: COMMERCIAL

## 2020-01-27 VITALS
RESPIRATION RATE: 14 BRPM | WEIGHT: 219 LBS | OXYGEN SATURATION: 95 % | DIASTOLIC BLOOD PRESSURE: 70 MMHG | BODY MASS INDEX: 30.54 KG/M2 | SYSTOLIC BLOOD PRESSURE: 128 MMHG | HEART RATE: 92 BPM | TEMPERATURE: 98.7 F

## 2020-01-27 DIAGNOSIS — K76.0 FATTY LIVER: ICD-10-CM

## 2020-01-27 DIAGNOSIS — I10 ESSENTIAL HYPERTENSION: ICD-10-CM

## 2020-01-27 DIAGNOSIS — R74.8 ELEVATED CPK: ICD-10-CM

## 2020-01-27 DIAGNOSIS — Z79.620 ADALIMUMAB (HUMIRA) LONG-TERM USE: ICD-10-CM

## 2020-01-27 DIAGNOSIS — E55.9 VITAMIN D DEFICIENCY: ICD-10-CM

## 2020-01-27 DIAGNOSIS — L40.50 PSORIATIC ARTHRITIS (HCC): ICD-10-CM

## 2020-01-27 PROCEDURE — 99214 OFFICE O/P EST MOD 30 MIN: CPT | Performed by: INTERNAL MEDICINE

## 2020-01-27 NOTE — PROGRESS NOTES
Chief Complaint- joint pain     Subjective:   Evens Garcia is a 59 y.o. male here today for follow up of rheumatological issues    This is a follow-up visit for this patient who is seen in this clinic for psoriatic arthritis.  Patient has had a diagnosis of psoriatic arthritis since about 2013.  Patient is currently on Humira 40 mg subcu every 2 weeks monotherapy but is been off of Humira now for about 3 months because upper respiratory infection.  Patient states he has not had a flare of his arthritis and wonders if he can go with less medication.  Patient denies any side effects from the medication, denies any unexplained weight loss, denies any fevers of unknown etiology, denies any GI upset, denies any rashes, denies any new joint swelling, denies recurrent infections.     Additional comorbidities include elevated liver functions status post liver ultrasound indicating fatty liver with all other negative serologies.    Also at last labs patient had elevated CPK of unclear etiology.    Additional comorbidity includes chronic left knee pain status post skiing accident with a meniscal injury status post rehab and patient is going to be trying to ski again this year.     S/p MTX-nausea, elevated LFTs  S/p sulfasalazine-no benefit     Anti-actin ab neg 7/2018-[facility]  Mitochondrial ab neg 7/2018 [facility]  CCP neg 1/2018 [facility]  RF neg 3/2013  TINA neg 3/2013  Uric acid 4.9 1/2018 [facility]  Quantiferon Gold neg 7/2018 [facility]  HBsAg/HBcAb neg 7/2018 [facility]  HCV neg 7/2018 [facility]  Feet x-rays 1/2017-indicates erosive arthritis on the left fifth MTP joint  Hand x-rays 1/2017-indicates DJD  SI joint x-rays 1/2017-no pathology  Liver ultrasound 7/2018-indicates fatty liver    Addendum 2/6/2020  HBsAg/HBcAb IgM neg 2/2020 [facility]  HCV neg 2/2020 [facility]  Quantiferon Gold neg 2/2020 [facility]    Addendum 2/6/2020  U-1 neg 2/2020 [facility]  U-2 neg 2/2020 [facility]  Mi-2 neg 2/2020 [facility]  Ku neg2/2020 [facility]  SRP neg  2/2020 [facility]  PL-7 neg 2/2020 [facility]  PL-12 neg 2/2020 [facility]  E-J neg 2/2020 [facility]  O-J neg 2/2020 [facility]  RO-52 pos 2/2020 [facility]  PM-Scl neg 2/2020 [facility]  SELENA-1 neg 2/2020 [facility]  Anti p155/140 (TIF-1) neg 2/2020 [facility]  MDA5 (CADM 140) neg 2/2020 [facility]  SAE1 neg 2/2020 [facility]  U3 RNP neg 2/2020 [facility]    Addendum 3/16/2020  Anti CN1A ab neg 3/2020             Current medicines (including changes today)  Current Outpatient Medications   Medication Sig Dispense Refill   • Adalimumab (HUMIRA PEN) 40 MG/0.8ML Pen-injector Kit 0.8 mL by Intramuscular route every 14 days. 6 Each 1   • omeprazole (PRILOSEC) 20 MG delayed-release capsule Take 1 Cap by mouth every day. 90 Cap 3   • ibuprofen (MOTRIN) 200 MG TABS Take 200 mg by mouth every 6 hours as needed. Takes two prn        No current facility-administered medications for this visit.      He  has no past medical history on file.    ROS   Other than what is mentioned in HPI or physical exam, there is no history of headaches, double vision or blurred vision. No temporal tenderness or jaw claudication. No trouble swallowing difficulties or sore throats.  No chest complaints including chest pain, cough or sputum production. No GI complaints including nausea, vomiting, change in bowel habits, or past peptic ulcer disease. No history of blood in the stools. No urinary complaints including dysuria or frequency. No history of alopecia, photosensitivity, oral ulcerations, Raynaud's phenomena.       Objective:     /70   Pulse 92   Temp 37.1 °C (98.7 °F) (Temporal)   Resp 14   Wt 99.3 kg (219 lb)   SpO2 95%  Body mass index is 30.54 kg/m².   Physical Exam:    Constitutional: Alert and oriented X3, patient is talkative with good eye contact.Skin: Warm, dry, good turgor, no rashes in visible areas, patient does have some psoriatic lesions on the extensor surfaces of both legs.Eye: Equal, round and reactive, conjunctiva clear, lids normal EOM intactENMT:  Lips without lesions, good dentition, no oropharyngeal ulcers, moist buccal mucosa, pinna without deformityNeck: Trachea midline, no masses, no thyromegaly.Lymph:  No cervical lymphadenopathy, no axillary lymphadenopathy, no supraclavicular lymphadenopathyRespiratory: Unlabored respiratory effort, lungs clear to auscultation, no wheezes, no ronchi.Cardiovascular: Normal S1, S2, no murmur, no edema.Abdomen: Soft, non-tender, no masses, no hepatosplenomegaly.Psych: Alert and oriented x3, normal affect and mood.Neuro: Cranial nerves 2-12 are grossly intact, no loss of sensation LEExt:no joint laxity noted in bilateral arms, no joint laxity noted in bilateral legs, no sausage digits no dactylitis no flexion contractures no crossover toes no splay toes    I have personally and independently reviewed labs done at [facility] done January 3, 2020 with Evens Garcia today at appointment      Lab Results   Component Value Date/Time    SODIUM 140 03/15/2013 12:30 PM    POTASSIUM 3.8 03/15/2013 12:30 PM    CHLORIDE 102 03/15/2013 12:30 PM    CO2 21 03/15/2013 12:30 PM    GLUCOSE 109 (H) 03/15/2013 12:30 PM    BUN 22 03/15/2013 12:30 PM    CREATININE 1.00 03/15/2013 12:30 PM    BUNCREATRAT 22 (H) 03/15/2013 12:30 PM    GLOMRATE >59 11/19/2010 08:43 AM      Lab Results   Component Value Date/Time    WBC 9.2 03/15/2013 12:30 PM    RBC 5.33 03/15/2013 12:30 PM    HEMOGLOBIN 15.1 03/15/2013 12:30 PM    HEMATOCRIT 44.4 03/15/2013 12:30 PM    MCV 83 03/15/2013 12:30 PM    MCH 28.3 03/15/2013 12:30 PM    MCHC 34.0 03/15/2013 12:30 PM    NEUTSPOLYS 72 03/15/2013 12:30 PM    LYMPHOCYTES 20 03/15/2013 12:30 PM    MONOCYTES 6 03/15/2013 12:30 PM    EOSINOPHILS 2 03/15/2013 12:30 PM    BASOPHILS 0 03/15/2013 12:30 PM      Lab Results   Component Value Date/Time    CALCIUM 9.5 03/15/2013 12:30 PM    ASTSGOT 33 03/15/2013 12:30 PM    ALTSGPT 39 03/15/2013 12:30 PM    ALKPHOSPHAT 119 03/15/2013 12:30 PM    TBILIRUBIN 0.4 03/15/2013 12:30  PM    ALBUMIN 4.4 03/15/2013 12:30 PM    TOTPROTEIN 6.9 03/15/2013 12:30 PM     Lab Results   Component Value Date/Time    RHEUMFACTN 8.8 03/15/2013 12:30 PM     Lab Results   Component Value Date/Time    ANADIRECT Negative 03/15/2013 12:30 PM     Lab Results   Component Value Date/Time    SEDRATEWES 19 03/15/2013 12:30 PM     Results for orders placed during the hospital encounter of 01/11/17   DX-JOINT SURVEY-HANDS SINGLE VIEW    Impression 1.  No acute fracture or bone erosion.    2.  Mild osteoarthritis.     Results for orders placed during the hospital encounter of 01/11/17   DX-JOINT SURVEY-FEET SINGLE VIEW    Impression 1.  No acute fracture or dislocation.    2.  Some soft tissue swelling is noted around the MTP joint of the fifth digit of the left foot and subtle bone erosions in the distal metatarsal are identified. Findings could be due to gout. Inflammatory arthropathy is also possible.     Results for orders placed during the hospital encounter of 12/21/07   DX-ANKLE 3+ VIEWS    Impression IMPRESSION:    SOFT TISSUE SWELLING AND EVIDENCE OF A JOINT EFFUSION WITHOUT EVIDENCE OF   ACUTE FRACTURE.      [email or web address]        Read By SABA KAUR MD on Dec 21 2007  3:35PM  : LVD Transcription Date: Dec 22 2007  6:29PM  THIS DOCUMENT HAS BEEN ELECTRONICALLY SIGNED BY: SABA KAUR MD on   Dec 24 2007  9:07AM     Results for orders placed during the hospital encounter of 01/11/17   DX-SACROILIAC JOINTS 3+    Impression Negative exam of the sacroiliac joints.     Results for orders placed in visit on 12/17/12   MR-KNEE-W/O    Impression 1. Advanced patellofemoral compartment osteoarthritis    2. Small medial meniscal tear the junction of the posterior horn and body.    3. Truncated medial meniscal free edge suggesting degeneration or from prior meniscectomy.    4. Very small tear of the lateral meniscus at the junction of posterior and body.    5. Joint effusion       Assessment and Plan:      1. Psoriatic arthritis (HCC)  Clinically doing well still has some psoriasis has not gotten worse, patient wants to try decreasing medication, will try doing Humira 40 mg subcu every 4 weeks with the option of going back to every 2 weeks if needed.  - ASPARTATE AMINO-HAIDER; Future  - ALANINE AMINO-TRANS; Future  - CREATINE KINASE; Future  - Miscellaneous Test; Future  - Quantiferon Gold Plus TB (4 tube); Future  - HEP B CORE AB IGM; Future  - HEP B SURFACE ANTIGEN; Future  - HEP C VIRUS ANTIBODY; Future  - Adalimumab (HUMIRA PEN) 40 MG/0.8ML Pen-injector Kit; 0.8 mL by Intramuscular route every 14 days.  Dispense: 6 Each; Refill: 1    2. Adalimumab (Humira) long-term use  On Humira 40 mg subcu every 2 weeks we will try going down to every 4 weeks.  Patient is due to have screening labs redone, screening labs ordered for patient, patient also needs monitoring labs done monitoring labs ordered for patient  - ASPARTATE AMINO-HAIDER; Future  - ALANINE AMINO-TRANS; Future  - CREATINE KINASE; Future  - Miscellaneous Test; Future  - Quantiferon Gold Plus TB (4 tube); Future  - HEP B CORE AB IGM; Future  - HEP B SURFACE ANTIGEN; Future  - HEP C VIRUS ANTIBODY; Future  -CMP  -CBC  -ESR  - Adalimumab (HUMIRA PEN) 40 MG/0.8ML Pen-injector Kit; 0.8 mL by Intramuscular route every 14 days.  Dispense: 6 Each; Refill: 1    3. Fatty liver  Unclear etiology, autoimmune serologies and viral serologies all negative, liver functions elevated again we will recheck to assure not secondary to exercise i.e. elevated CPK and elevated AST/ALT  - ASPARTATE AMINO-HAIDER; Future  - ALANINE AMINO-TRANS; Future  - CREATINE KINASE; Future  - Quantiferon Gold Plus TB (4 tube); Future  - HEP B CORE AB IGM; Future  - HEP B SURFACE ANTIGEN; Future  - HEP C VIRUS ANTIBODY; Future    4. Elevated CPK  Unclear etiology may be secondary to exercise prior to lab check, we will recheck CPK having holding of exercise for 1 to 2 days  Also check myositis  panel  - Miscellaneous Test; Future  - Quantiferon Gold Plus TB (4 tube); Future  - HEP B CORE AB IGM; Future  - HEP B SURFACE ANTIGEN; Future  - HEP C VIRUS ANTIBODY; Future    5. Vitamin D deficiency  Vitamin D level low BUN last labs, recommend vitamin D over-the-counter 2000 units a day recheck again in about 3 months    6. Essential hypertension  May impact the type of medications we can use for this patient's arthritis. We will have to keep this under advisement.  - Quantiferon Gold Plus TB (4 tube); Future  - HEP B CORE AB IGM; Future  - HEP B SURFACE ANTIGEN; Future  - HEP C VIRUS ANTIBODY; Future  - Adalimumab (HUMIRA PEN) 40 MG/0.8ML Pen-injector Kit; 0.8 mL by Intramuscular route every 14 days.  Dispense: 6 Each; Refill: 1    Followup: Return in about 6 months (around 7/27/2020). or sooner abdoul Garcia  was seen 30 minutes face-to-face of which more than 50% of the time was spent counseling the patient (excluding time for procedures)  regarding  rheumatological condition and care. Therapy was discussed in detail.      Please note that this dictation was created using voice recognition software. I have made every reasonable attempt to correct obvious errors, but I expect that there are errors of grammar and possibly content that I did not discover before finalizing the note.

## 2020-02-06 ENCOUNTER — TELEPHONE (OUTPATIENT)
Dept: RHEUMATOLOGY | Facility: MEDICAL CENTER | Age: 60
End: 2020-02-06

## 2020-02-06 NOTE — TELEPHONE ENCOUNTER
Called patient, notified patient of essentially negative myositis panel except for a positive anti-RO antibody possibly associated with Sjogren's patient CPK still elevated low-level about 422.  Literature indicates that possible association of Sjogren's with inclusion body myositis, will have patient do antibody cytosolic 5' nucleotidase 1 a antibody for further evaluation  Lab ordered for patient, patient states understanding

## 2020-03-05 ENCOUNTER — HOSPITAL ENCOUNTER (OUTPATIENT)
Dept: LAB | Facility: MEDICAL CENTER | Age: 60
End: 2020-03-05
Attending: INTERNAL MEDICINE
Payer: COMMERCIAL

## 2020-03-05 DIAGNOSIS — Z79.620 ADALIMUMAB (HUMIRA) LONG-TERM USE: ICD-10-CM

## 2020-03-05 DIAGNOSIS — I10 ESSENTIAL HYPERTENSION: ICD-10-CM

## 2020-03-05 DIAGNOSIS — L40.50 PSORIATIC ARTHRITIS (HCC): ICD-10-CM

## 2020-03-05 PROCEDURE — 36415 COLL VENOUS BLD VENIPUNCTURE: CPT

## 2020-03-05 PROCEDURE — 83520 IMMUNOASSAY QUANT NOS NONAB: CPT

## 2020-03-05 NOTE — TELEPHONE ENCOUNTER
Received request via: Pharmacy  [facility] [place]  Was the patient seen in the last year in this department? Yes    Does the patient have an active prescription (recently filled or refills available) for medication(s) requested? No

## 2020-03-09 DIAGNOSIS — L40.50 PSORIATIC ARTHRITIS (HCC): ICD-10-CM

## 2020-03-09 DIAGNOSIS — Z79.620 ADALIMUMAB (HUMIRA) LONG-TERM USE: ICD-10-CM

## 2020-03-09 DIAGNOSIS — I10 ESSENTIAL HYPERTENSION: ICD-10-CM

## 2020-03-09 NOTE — TELEPHONE ENCOUNTER
SECOND REQUEST    Received request via: Patient  [facility] in media  Was the patient seen in the last year in this department? Yes    Does the patient have an active prescription (recently filled or refills available) for medication(s) requested? No

## 2020-03-12 DIAGNOSIS — I10 ESSENTIAL HYPERTENSION: ICD-10-CM

## 2020-03-12 DIAGNOSIS — R74.8 ELEVATED CPK: ICD-10-CM

## 2020-03-12 DIAGNOSIS — L40.50 PSORIATIC ARTHRITIS (HCC): ICD-10-CM

## 2020-03-12 DIAGNOSIS — Z79.620 ADALIMUMAB (HUMIRA) LONG-TERM USE: ICD-10-CM

## 2020-03-12 DIAGNOSIS — R79.89 ELEVATED LFTS: ICD-10-CM

## 2020-03-12 NOTE — TELEPHONE ENCOUNTER
Called patient, patient may be doing his Humira shot before his blood tests which may be causing the elevated CPK  We discussed doing blood tests 2 weeks after last shot and before doing the scheduled Humira shot to see if that is the etiology of patient's elevated CPKs    We will also check antibody [id number] for evaluation of inclusion body myositis.  Patient states understanding

## 2020-03-12 NOTE — TELEPHONE ENCOUNTER
Received request via: Patient  [facility]   Was the patient seen in the last year in this department? Yes    Does the patient have an active prescription (recently filled or refills available) for medication(s) requested? No     New specialty pharmacy.. please send to [facility]      ALSO LIBAN IS ASKING FOR HIS LAB RESULTS FROM 3/5/20 DONE AT [facility]. PLEASE ADVISE

## 2020-03-13 LAB — TEST NAME 95000: NORMAL

## 2020-07-10 ENCOUNTER — OFFICE VISIT (OUTPATIENT)
Dept: MEDICAL GROUP | Facility: PHYSICIAN GROUP | Age: 60
End: 2020-07-10
Payer: COMMERCIAL

## 2020-07-10 VITALS
TEMPERATURE: 97.8 F | HEIGHT: 71 IN | DIASTOLIC BLOOD PRESSURE: 78 MMHG | BODY MASS INDEX: 30.32 KG/M2 | RESPIRATION RATE: 20 BRPM | SYSTOLIC BLOOD PRESSURE: 122 MMHG | OXYGEN SATURATION: 95 % | HEART RATE: 80 BPM | WEIGHT: 216.6 LBS

## 2020-07-10 DIAGNOSIS — K21.00 GASTROESOPHAGEAL REFLUX DISEASE WITH ESOPHAGITIS: ICD-10-CM

## 2020-07-10 DIAGNOSIS — E78.00 ELEVATED LDL CHOLESTEROL LEVEL: ICD-10-CM

## 2020-07-10 DIAGNOSIS — Z87.19 HISTORY OF ESOPHAGEAL STRICTURE: ICD-10-CM

## 2020-07-10 DIAGNOSIS — L40.50 PSORIATIC ARTHRITIS (HCC): ICD-10-CM

## 2020-07-10 DIAGNOSIS — E66.9 OBESITY (BMI 30-39.9): ICD-10-CM

## 2020-07-10 PROBLEM — S83.512A RUPTURE OF ANTERIOR CRUCIATE LIGAMENT OF LEFT KNEE: Status: RESOLVED | Noted: 2017-04-05 | Resolved: 2020-07-10

## 2020-07-10 PROBLEM — S83.412A SPRAIN OF MEDIAL COLLATERAL LIGAMENT OF LEFT KNEE: Status: ACTIVE | Noted: 2017-04-05

## 2020-07-10 PROBLEM — S83.512A RUPTURE OF ANTERIOR CRUCIATE LIGAMENT OF LEFT KNEE: Status: ACTIVE | Noted: 2017-04-05

## 2020-07-10 PROBLEM — S83.412A SPRAIN OF MEDIAL COLLATERAL LIGAMENT OF LEFT KNEE: Status: RESOLVED | Noted: 2017-04-05 | Resolved: 2020-07-10

## 2020-07-10 PROCEDURE — 99214 OFFICE O/P EST MOD 30 MIN: CPT | Performed by: PHYSICIAN ASSISTANT

## 2020-07-10 ASSESSMENT — PATIENT HEALTH QUESTIONNAIRE - PHQ9: CLINICAL INTERPRETATION OF PHQ2 SCORE: 0

## 2020-07-10 NOTE — PROGRESS NOTES
Chief Complaint   Patient presents with   • Establish Care       HISTORY OF PRESENT ILLNESS: Evens Garcia is an established 60 y.o. male here to discuss the evaluation and management of:    Psoriatic arthritis (HCC)  Stable problem.  Patient follows up with rheumatology regularly.  He tells me that he uses Humira.  Things are managed with medication.  No further work-up indicated this time.    Obesity (BMI 30-39.9)  Patient states he has a regular exercise routine and exercise routine consist of walking 3-6 miles daily.  States he feels that his diet is healthy.    Elevated LDL cholesterol level  Per chart review patient has had an elevated LDL in the past.  Patient is not on a statin medication.  Repeat lab work.    Gastroesophageal reflux disease with esophagitis  History of esophageal stricture  Chronic problem.  Currently uncontrolled.  Patient states daily he experiences acid reflux symptoms.  He tells me he takes Prilosec as needed.  He mentions that he has a history of esophageal strictures and every 2-3 years he will have to go in and get esophageal dilation done.  States he is due for this and this is why acid reflux symptoms have exacerbated.  Denies difficulty or pain with swallowing, hoarseness of voice, chronic cough, abdominal pain, nausea, vomiting.        Patient Active Problem List    Diagnosis Date Noted   • Obesity (BMI 30-39.9) 07/10/2020   • Fatty liver 07/10/2018   • Psoriatic arthritis (HCC) 01/15/2016   • GERD (gastroesophageal reflux disease) 01/15/2016   • Hyperlipidemia 01/15/2016   • Family history of diabetes mellitus 01/15/2016   • Family history of prostate cancer in father 01/15/2016       Allergies:Nkda [no known drug allergy]    Current Outpatient Medications   Medication Sig Dispense Refill   • VITAMIN D PO Take  by mouth.     • Ascorbic Acid (VITAMIN C PO) Take  by mouth.     • VITAMIN E PO Take  by mouth.     • Adalimumab (HUMIRA PEN) 40 MG/0.8ML Pen-injector Kit 0.8  mL by Intramuscular route every 14 days. 6 Each 1   • ibuprofen (MOTRIN) 200 MG TABS Take 200 mg by mouth every 6 hours as needed. Takes two prn      • omeprazole (PRILOSEC) 20 MG delayed-release capsule Take 1 Cap by mouth every day. (Patient not taking: Reported on 7/10/2020) 90 Cap 3     No current facility-administered medications for this visit.        Social History     Tobacco Use   • Smoking status: Never Smoker   • Smokeless tobacco: Never Used   Substance Use Topics   • Alcohol use: Yes     Alcohol/week: 1.0 oz     Types: 2 Glasses of wine per week     Comment: Socially.    • Drug use: No       Family Status   Relation Name Status   • Mo  Alive   • Fa  Alive   • Sis 1 Alive   • Bro 1 Alive   • MGMo     • MGFa     • PGMo     • PGFa     • Son  Alive   • Son  Alive     Family History   Problem Relation Age of Onset   • Cancer Father         prostate   • Alcohol/Drug Maternal Grandfather    • Cancer Paternal Grandmother         colon   • Cancer Paternal Grandfather         prostate   • No Known Problems Son    • No Known Problems Son        ROS:  Review of Systems   Constitutional: Negative for fever, chills, weight loss and malaise/fatigue.   HENT: Negative for ear pain, nosebleeds, congestion, sore throat and neck pain.    Eyes: Negative for blurred vision.   Respiratory: Negative for cough, sputum production, shortness of breath and wheezing.    Cardiovascular: Negative for chest pain, palpitations, orthopnea and leg swelling.   Gastrointestinal: Negative nausea, vomiting and abdominal pain. + for heartburn.   Genitourinary: Negative for dysuria, urgency and frequency.   Musculoskeletal: Negative for myalgias, back pain and joint pain.   Skin: Negative for rash and itching.   Neurological: Negative for dizziness, tingling, tremors, sensory change, focal weakness and headaches.   Endo/Heme/Allergies: Does not bruise/bleed easily.   Psychiatric/Behavioral: Negative for  "depression, suicidal ideas and memory loss.  The patient is not nervous/anxious and does not have insomnia.    All other systems reviewed and are negative except as in HPI.    Exam: /78 (BP Location: Left arm, Patient Position: Sitting)   Pulse 80   Temp 36.6 °C (97.8 °F) (Temporal)   Resp 20   Ht 1.803 m (5' 11\")   Wt 98.2 kg (216 lb 9.6 oz)   SpO2 95%  Body mass index is 30.21 kg/m².  General: Normal appearing. No distress.  HEENT: Normocephalic. Eyes conjunctiva clear lids without ptosis, ears normal shape and contour.  Neck: Supple without JVD . Thyroid is not enlarged.  Pulmonary: Clear to ausculation.  Normal effort. No rales, ronchi, or wheezing.  Cardiovascular: Regular rate and rhythm without murmur.   Abdomen: Nondistended.   Neurologic: Grossly nonfocal.  Cranial nerves are normal.  Lymph: No cervical, supraclavicular or axillary lymph nodes are palpable  Skin: Warm and dry.  No rashes or suspicious skin lesions.  Positive for actinic keratosis.  Musculoskeletal: Normal gait. No extremity cyanosis, clubbing, or edema.  Psych: Normal mood and affect. Alert and oriented x3. Judgment and insight is normal.    Medical decision-making and discussion:  1. Psoriatic arthritis (HCC)  Chronic but stable problem.  Continue following up with dermatology.  Continue current medication regimen.  Continue to monitor.    2. Obesity (BMI 30-39.9)  - Encouraged diet high in fruits, vegetables, and fiber. And a diet low in salt, refined carbohydrates, cholesterol, saturated fat, and trans fatty acids.    - Encouraged  a minimum of 30 minutes of moderate intensity aerobic exercise (eg, brisk walking) is recommended on five days each week. Or 20 minutes of vigorous-intensity aerobic exercise (eg, jogging) on three days each week.       - Patient identified as having weight management issue.  Appropriate orders and counseling given.    3. Elevated LDL cholesterol level  Chronic problem.  Unknown stable.  Lab work " is been ordered.  Patient be contacted with results.  Continue working on diet and exercise.    - Lipid Profile; Future    4. Gastroesophageal reflux disease with esophagitis  Problem.  Could be better controlled.  Continue Prilosec as needed.  Continue working on diet and exercise.    5. History of esophageal stricture  Chronic but worsening problem.  She will be following up with gastroenterology in the near future for esophageal dilation.  Continue to monitor.      Please note that this dictation was created using voice recognition software. I have made every reasonable attempt to correct obvious errors, but I expect that there are errors of grammar and possibly content that I did not discover before finalizing the note.    Assessment/Plan:  1. Psoriatic arthritis (HCC)     2. Obesity (BMI 30-39.9)  Patient identified as having weight management issue.  Appropriate orders and counseling given.   3. Elevated LDL cholesterol level  Lipid Profile   4. Gastroesophageal reflux disease with esophagitis     5. History of esophageal stricture         Return in about 1 year (around 7/10/2021).

## 2020-07-27 ENCOUNTER — HOSPITAL ENCOUNTER (OUTPATIENT)
Dept: LAB | Facility: MEDICAL CENTER | Age: 60
End: 2020-07-27
Attending: INTERNAL MEDICINE
Payer: COMMERCIAL

## 2020-07-27 ENCOUNTER — HOSPITAL ENCOUNTER (OUTPATIENT)
Dept: LAB | Facility: MEDICAL CENTER | Age: 60
End: 2020-07-27
Attending: PHYSICIAN ASSISTANT
Payer: COMMERCIAL

## 2020-07-27 ENCOUNTER — TELEPHONE (OUTPATIENT)
Dept: MEDICAL GROUP | Facility: PHYSICIAN GROUP | Age: 60
End: 2020-07-27

## 2020-07-27 DIAGNOSIS — R74.8 ELEVATED CPK: ICD-10-CM

## 2020-07-27 DIAGNOSIS — E78.00 ELEVATED LDL CHOLESTEROL LEVEL: ICD-10-CM

## 2020-07-27 DIAGNOSIS — L40.50 PSORIATIC ARTHRITIS (HCC): ICD-10-CM

## 2020-07-27 DIAGNOSIS — K76.0 FATTY LIVER: ICD-10-CM

## 2020-07-27 DIAGNOSIS — I10 ESSENTIAL HYPERTENSION: ICD-10-CM

## 2020-07-27 DIAGNOSIS — Z79.620 ADALIMUMAB (HUMIRA) LONG-TERM USE: ICD-10-CM

## 2020-07-27 LAB
ALBUMIN SERPL BCP-MCNC: 4.2 G/DL (ref 3.2–4.9)
ALBUMIN/GLOB SERPL: 1.6 G/DL
ALP SERPL-CCNC: 88 U/L (ref 30–99)
ALT SERPL-CCNC: 49 U/L (ref 2–50)
ANION GAP SERPL CALC-SCNC: 11 MMOL/L (ref 7–16)
AST SERPL-CCNC: 40 U/L (ref 12–45)
BASOPHILS # BLD AUTO: 0.6 % (ref 0–1.8)
BASOPHILS # BLD: 0.04 K/UL (ref 0–0.12)
BILIRUB SERPL-MCNC: 0.6 MG/DL (ref 0.1–1.5)
BUN SERPL-MCNC: 17 MG/DL (ref 8–22)
CALCIUM SERPL-MCNC: 9.1 MG/DL (ref 8.5–10.5)
CHLORIDE SERPL-SCNC: 103 MMOL/L (ref 96–112)
CHOLEST SERPL-MCNC: 159 MG/DL (ref 100–199)
CK SERPL-CCNC: 582 U/L (ref 0–154)
CO2 SERPL-SCNC: 23 MMOL/L (ref 20–33)
CREAT SERPL-MCNC: 0.86 MG/DL (ref 0.5–1.4)
EOSINOPHIL # BLD AUTO: 0.23 K/UL (ref 0–0.51)
EOSINOPHIL NFR BLD: 3.6 % (ref 0–6.9)
ERYTHROCYTE [DISTWIDTH] IN BLOOD BY AUTOMATED COUNT: 42.3 FL (ref 35.9–50)
ERYTHROCYTE [SEDIMENTATION RATE] IN BLOOD BY WESTERGREN METHOD: <1 MM/HOUR (ref 0–20)
FASTING STATUS PATIENT QL REPORTED: NORMAL
FASTING STATUS PATIENT QL REPORTED: NORMAL
GLOBULIN SER CALC-MCNC: 2.7 G/DL (ref 1.9–3.5)
GLUCOSE SERPL-MCNC: 102 MG/DL (ref 65–99)
HBV CORE IGM SER QL: NORMAL
HBV SURFACE AG SER QL: NORMAL
HCT VFR BLD AUTO: 48.7 % (ref 42–52)
HCV AB SER QL: NORMAL
HDLC SERPL-MCNC: 38 MG/DL
HGB BLD-MCNC: 16.5 G/DL (ref 14–18)
IMM GRANULOCYTES # BLD AUTO: 0.02 K/UL (ref 0–0.11)
IMM GRANULOCYTES NFR BLD AUTO: 0.3 % (ref 0–0.9)
LDLC SERPL CALC-MCNC: 93 MG/DL
LYMPHOCYTES # BLD AUTO: 1.75 K/UL (ref 1–4.8)
LYMPHOCYTES NFR BLD: 27.2 % (ref 22–41)
MCH RBC QN AUTO: 30.7 PG (ref 27–33)
MCHC RBC AUTO-ENTMCNC: 33.9 G/DL (ref 33.7–35.3)
MCV RBC AUTO: 90.5 FL (ref 81.4–97.8)
MONOCYTES # BLD AUTO: 0.52 K/UL (ref 0–0.85)
MONOCYTES NFR BLD AUTO: 8.1 % (ref 0–13.4)
NEUTROPHILS # BLD AUTO: 3.88 K/UL (ref 1.82–7.42)
NEUTROPHILS NFR BLD: 60.2 % (ref 44–72)
NRBC # BLD AUTO: 0 K/UL
NRBC BLD-RTO: 0 /100 WBC
PLATELET # BLD AUTO: 184 K/UL (ref 164–446)
PMV BLD AUTO: 9.9 FL (ref 9–12.9)
POTASSIUM SERPL-SCNC: 3.5 MMOL/L (ref 3.6–5.5)
PROT SERPL-MCNC: 6.9 G/DL (ref 6–8.2)
RBC # BLD AUTO: 5.38 M/UL (ref 4.7–6.1)
SODIUM SERPL-SCNC: 137 MMOL/L (ref 135–145)
TRIGL SERPL-MCNC: 140 MG/DL (ref 0–149)
WBC # BLD AUTO: 6.4 K/UL (ref 4.8–10.8)

## 2020-07-27 PROCEDURE — 85652 RBC SED RATE AUTOMATED: CPT

## 2020-07-27 PROCEDURE — 82550 ASSAY OF CK (CPK): CPT

## 2020-07-27 PROCEDURE — 86803 HEPATITIS C AB TEST: CPT

## 2020-07-27 PROCEDURE — 80061 LIPID PANEL: CPT

## 2020-07-27 PROCEDURE — 85025 COMPLETE CBC W/AUTO DIFF WBC: CPT

## 2020-07-27 PROCEDURE — 86480 TB TEST CELL IMMUN MEASURE: CPT

## 2020-07-27 PROCEDURE — 86235 NUCLEAR ANTIGEN ANTIBODY: CPT | Mod: 91

## 2020-07-27 PROCEDURE — 36415 COLL VENOUS BLD VENIPUNCTURE: CPT

## 2020-07-27 PROCEDURE — 87340 HEPATITIS B SURFACE AG IA: CPT

## 2020-07-27 PROCEDURE — 86705 HEP B CORE ANTIBODY IGM: CPT

## 2020-07-27 PROCEDURE — 83516 IMMUNOASSAY NONANTIBODY: CPT | Mod: 91

## 2020-07-27 PROCEDURE — 80053 COMPREHEN METABOLIC PANEL: CPT

## 2020-07-27 NOTE — TELEPHONE ENCOUNTER
Phone Number Called: [phone] (home)       Call outcome: Spoke to patient regarding message below.    Message: Pt informed, Pt had question on what can he do to raise the good cholesterol?    Please advise.

## 2020-07-27 NOTE — TELEPHONE ENCOUNTER
Phone Number Called: [phone] (home)       Call outcome: Spoke to patient regarding message below.    Message: Pt informed, no further questions at this time.

## 2020-07-27 NOTE — TELEPHONE ENCOUNTER
----- Message from Michael Torrez M.D. sent at 7/27/2020  1:17 PM PDT -----  Total cholesterol is within normal limits  Triglycerides are normal  Is good cholesterol known as HDL is mildly low at 38 when it should be more than 40  LDL is normal      Thank you,    Dr. LON Torrez  Family Medicine Physician  [facility]  [phone]

## 2020-07-29 LAB
GAMMA INTERFERON BACKGROUND BLD IA-ACNC: 0.02 IU/ML
M TB IFN-G BLD-IMP: NEGATIVE
M TB IFN-G CD4+ BCKGRND COR BLD-ACNC: 0 IU/ML
MITOGEN IGNF BCKGRD COR BLD-ACNC: >10 IU/ML
QFT TB2 - NIL TBQ2: 0 IU/ML

## 2020-08-03 ENCOUNTER — OFFICE VISIT (OUTPATIENT)
Dept: RHEUMATOLOGY | Facility: MEDICAL CENTER | Age: 60
End: 2020-08-03
Payer: COMMERCIAL

## 2020-08-03 VITALS
WEIGHT: 214 LBS | OXYGEN SATURATION: 96 % | TEMPERATURE: 97.9 F | HEART RATE: 88 BPM | BODY MASS INDEX: 29.85 KG/M2 | SYSTOLIC BLOOD PRESSURE: 128 MMHG | DIASTOLIC BLOOD PRESSURE: 78 MMHG | RESPIRATION RATE: 14 BRPM

## 2020-08-03 DIAGNOSIS — Z51.81 MEDICATION MONITORING ENCOUNTER: ICD-10-CM

## 2020-08-03 DIAGNOSIS — Z79.620 ADALIMUMAB (HUMIRA) LONG-TERM USE: ICD-10-CM

## 2020-08-03 DIAGNOSIS — L40.50 PSORIATIC ARTHRITIS (HCC): ICD-10-CM

## 2020-08-03 DIAGNOSIS — R74.8 ELEVATED CPK: ICD-10-CM

## 2020-08-03 DIAGNOSIS — I10 ESSENTIAL HYPERTENSION: ICD-10-CM

## 2020-08-03 DIAGNOSIS — K76.0 FATTY LIVER: ICD-10-CM

## 2020-08-03 PROCEDURE — 99214 OFFICE O/P EST MOD 30 MIN: CPT | Performed by: INTERNAL MEDICINE

## 2020-08-03 ASSESSMENT — FIBROSIS 4 INDEX: FIB4 SCORE: 1.86

## 2020-08-03 NOTE — PROGRESS NOTES
Chief Complaint- joint pain     Subjective:   Evens Garcia is a 60 y.o. male here today for follow up of rheumatological issues    This is a follow-up visit for this patient who we see in this clinic for psoriatic arthritis.  Patient has had a diagnosis of psoriatic arthritis since about 2013.  Patient is currently on Humira 40 mg subcu every 2 weeks monotherapy.  Patient states he thinks he may or does work but we are constantly worried about his elevated CPK for which patient has had extensive work-up for autoimmune myositis all which has been negative.  The current feeling now is that CPK may be elevated from liver enzymes.  Patient wonders why his liver enzymes are elevated, does not have any predispositions for problems with his liver.  Of note recent sedimentation rate equals less than 1 July 2020.  CPK elevated at 580 2 July 2020.  Patient denies any side effects from the medication, denies any unexplained weight loss, denies any fevers of unknown etiology, denies any GI upset, denies any rashes, denies any new joint swelling, denies recurrent infections.      Additional comorbidities include elevated liver functions status post liver ultrasound indicating fatty liver with all other negative serologies.      Additional comorbidity includes chronic left knee pain status post skiing accident with a meniscal injury status post rehab and patient is going to be trying to ski again this year.     S/p MTX-nausea, elevated LFTs  S/p sulfasalazine-no benefit     Anti-actin ab neg 7/2018-[facility]  Mitochondrial ab neg 7/2018 [facility]  CCP neg 1/2018 [facility]  RF neg 3/2013  TINA neg 3/2013  Uric acid 4.9 1/2018 [facility]  Feet x-rays 1/2017-indicates erosive arthritis on the left fifth MTP joint  Hand x-rays 1/2017-indicates DJD  SI joint x-rays 1/2017-no pathology  Liver ultrasound 7/2018-indicates fatty liver  HBsAg/HBcAb IgM neg 2/2020 [facility]  HCV neg 2/2020 [facility]  Quantiferon Gold neg 2/2020 [facility]   U-1  neg 2/2020 [facility]  U-2 neg 2/2020 [facility]  Mi-2 neg 2/2020 [facility]  Ku neg2/2020 [facility]  SRP neg 2/2020 [facility]  PL-7 neg 2/2020 [facility]  PL-12 neg 2/2020 [facility]  E-J neg 2/2020 [facility]  O-J neg 2/2020 [facility]  RO-52 pos 2/2020 [facility]  PM-Scl neg 2/2020 [facility]  SELENA-1 neg 2/2020 [facility]  Anti p155/140 (TIF-1) neg 2/2020 [facility]  MDA5 (CADM 140) neg 2/2020 [facility]  SAE1 neg 2/2020 [facility]  U3 RNP neg 2/2020 [facility]  Anti CN1A ab neg 3/2020           Current medicines (including changes today)  Current Outpatient Medications   Medication Sig Dispense Refill   • VITAMIN D PO Take  by mouth.     • Ascorbic Acid (VITAMIN C PO) Take  by mouth.     • VITAMIN E PO Take  by mouth.     • ibuprofen (MOTRIN) 200 MG TABS Take 200 mg by mouth every 6 hours as needed. Takes two prn      • omeprazole (PRILOSEC) 20 MG delayed-release capsule Take 1 Cap by mouth every day. (Patient not taking: Reported on 7/10/2020) 90 Cap 3     No current facility-administered medications for this visit.      He  has no past medical history on file.    ROS   Other than what is mentioned in HPI or physical exam, there is no history of headaches, double vision or blurred vision. No temporal tenderness or jaw claudication. No trouble swallowing difficulties or sore throats.  No chest complaints including chest pain, cough or sputum production. No GI complaints including nausea, vomiting, change in bowel habits, or past peptic ulcer disease. No history of blood in the stools. No urinary complaints including dysuria or frequency. No history of alopecia, photosensitivity, oral ulcerations, Raynaud's phenomena.       Objective:     /78   Pulse 88   Temp 36.6 °C (97.9 °F) (Temporal)   Resp 14   Wt 97.1 kg (214 lb)   SpO2 96%  Body mass index is 29.85 kg/m².   Physical Exam:    Constitutional: Alert and oriented X3, patient is talkative with good eye contact.Skin: Warm, dry, good turgor, no rashes in visible areas, no psoriatic lesions seen  today.Eye: Equal, round and reactive, conjunctiva clear, lids normal EOM intactENMT: Lips without lesions, good dentition, no oropharyngeal ulcers, moist buccal mucosa, pinna without deformityNeck: Trachea midline, no masses, no thyromegaly.Lymph:  No cervical lymphadenopathy, no axillary lymphadenopathy, no supraclavicular lymphadenopathyRespiratory: Unlabored respiratory effort, lungs clear to auscultation, no wheezes, no ronchi.Cardiovascular: Normal S1, S2, no murmur, no edema.Abdomen: Soft, non-tender, no masses, no hepatosplenomegaly.Psych: Alert and oriented x3, normal affect and mood.Neuro: Cranial nerves 2-12 are grossly intact, no loss of sensation LEExt:no joint laxity noted in bilateral arms, no joint laxity noted in bilateral legs, joints look great no swan-neck or boutonniere deformities no sausage digits no dactylitis, toes without crossover toes without splay toes    Lab Results   Component Value Date/Time    HEPBCORIGM Non-Reactive 07/27/2020 06:58 AM    HEPBSAG Non-Reactive 07/27/2020 06:58 AM     Lab Results   Component Value Date/Time    HEPCAB Non-Reactive 07/27/2020 06:58 AM     Lab Results   Component Value Date/Time    SODIUM 137 07/27/2020 06:58 AM    POTASSIUM 3.5 (L) 07/27/2020 06:58 AM    CHLORIDE 103 07/27/2020 06:58 AM    CO2 23 07/27/2020 06:58 AM    GLUCOSE 102 (H) 07/27/2020 06:58 AM    BUN 17 07/27/2020 06:58 AM    CREATININE 0.86 07/27/2020 06:58 AM    CREATININE 1.00 03/15/2013 12:30 PM    BUNCREATRAT 22 (H) 03/15/2013 12:30 PM    GLOMRATE >59 11/19/2010 08:43 AM      Lab Results   Component Value Date/Time    WBC 6.4 07/27/2020 06:58 AM    WBC 9.2 03/15/2013 12:30 PM    RBC 5.38 07/27/2020 06:58 AM    RBC 5.33 03/15/2013 12:30 PM    HEMOGLOBIN 16.5 07/27/2020 06:58 AM    HEMATOCRIT 48.7 07/27/2020 06:58 AM    MCV 90.5 07/27/2020 06:58 AM    MCV 83 03/15/2013 12:30 PM    MCH 30.7 07/27/2020 06:58 AM    MCH 28.3 03/15/2013 12:30 PM    MCHC 33.9 07/27/2020 06:58 AM    MPV 9.9  07/27/2020 06:58 AM    NEUTSPOLYS 60.20 07/27/2020 06:58 AM    LYMPHOCYTES 27.20 07/27/2020 06:58 AM    MONOCYTES 8.10 07/27/2020 06:58 AM    EOSINOPHILS 3.60 07/27/2020 06:58 AM    BASOPHILS 0.60 07/27/2020 06:58 AM      Lab Results   Component Value Date/Time    CALCIUM 9.1 07/27/2020 06:58 AM    ASTSGOT 40 07/27/2020 06:58 AM    ALTSGPT 49 07/27/2020 06:58 AM    ALKPHOSPHAT 88 07/27/2020 06:58 AM    TBILIRUBIN 0.6 07/27/2020 06:58 AM    ALBUMIN 4.2 07/27/2020 06:58 AM    TOTPROTEIN 6.9 07/27/2020 06:58 AM     Lab Results   Component Value Date/Time    RHEUMFACTN 8.8 03/15/2013 12:30 PM     Lab Results   Component Value Date/Time    ANADIRECT Negative 03/15/2013 12:30 PM     Lab Results   Component Value Date/Time    SEDRATEWES <1 07/27/2020 06:58 AM     Lab Results   Component Value Date/Time    CPKTOTAL 582 (H) 07/27/2020 06:58 AM     Results for orders placed during the hospital encounter of 01/11/17   DX-JOINT SURVEY-HANDS SINGLE VIEW    Impression 1.  No acute fracture or bone erosion.    2.  Mild osteoarthritis.     Results for orders placed during the hospital encounter of 01/11/17   DX-JOINT SURVEY-FEET SINGLE VIEW    Impression 1.  No acute fracture or dislocation.    2.  Some soft tissue swelling is noted around the MTP joint of the fifth digit of the left foot and subtle bone erosions in the distal metatarsal are identified. Findings could be due to gout. Inflammatory arthropathy is also possible.      Results for orders placed during the hospital encounter of 12/21/07   DX-ANKLE 3+ VIEWS    Impression IMPRESSION:    SOFT TISSUE SWELLING AND EVIDENCE OF A JOINT EFFUSION WITHOUT EVIDENCE OF   ACUTE FRACTURE.      [email or web address]        Read By SABA KAUR MD on Dec 21 2007  3:35PM  : STERLING Transcription Date: Dec 22 2007  6:29PM  THIS DOCUMENT HAS BEEN ELECTRONICALLY SIGNED BY: SABA KAUR MD on   Dec 24 2007  9:07AM        Results for orders placed during the hospital encounter of  01/11/17   DX-SACROILIAC JOINTS 3+    Impression Negative exam of the sacroiliac joints.     Results for orders placed in visit on 12/17/12   MR-KNEE-W/O    Impression 1. Advanced patellofemoral compartment osteoarthritis    2. Small medial meniscal tear the junction of the posterior horn and body.    3. Truncated medial meniscal free edge suggesting degeneration or from prior meniscectomy.    4. Very small tear of the lateral meniscus at the junction of posterior and body.    5. Joint effusion       Assessment and Plan:     1. Psoriatic arthritis (HCC)  Doing well with Humira but long discussion with patient we wonder if the Humira may be causing some of the liver function elevation?  Long discussion with patient we opted to do a trial of an IL 17 inhibitor i.e. Cosentyx 150 mg subcu every month after loading dose of 150 mg subcu every week for the first 5 weeks.  - CK ISOENZYMES SERUM; Future    2. Adalimumab (Humira) long-term use  We will stop the Humira and switch over to Cosentyx I 50 mg subcu every month after loading dose  - CK ISOENZYMES SERUM; Future    3. Medication monitoring encounter  Start Cosentyx 150 mg subcu every month after loading dose of Cosentyx of 150 mg subcu every week for 5 weeks  Of note screening labs are up-to-date, patient needs monitoring labs every 6 months next labs to be due about January 2021, will order at next visit  We reviewed risks of biological medications with patient including hematological pathology, cancer risks, neurological and infection issues, patient states understanding.  - CK ISOENZYMES SERUM; Future    4. Fatty liver  Confirmed by ultrasound unclear etiology  Query if Humira is the culprit?  We will switch Humira to Cosentyx as stated above    5. Elevated CPK  Most likely secondary to elevated liver functions, today we will check CPK isoenzymes    6. Essential hypertension  May impact the type of medications we can use for this patient's arthritis. We will have  to keep this under advisement.    Followup: Return in about 2 months (around 10/3/2020). or sooner abdoul Horner Humza Garcia  was seen 30 minutes face-to-face of which more than 50% of the time was spent counseling the patient (excluding time for procedures)  regarding  rheumatological condition and care. Therapy was discussed in detail.      Please note that this dictation was created using voice recognition software. I have made every reasonable attempt to correct obvious errors, but I expect that there are errors of grammar and possibly content that I did not discover before finalizing the note.             Addendum 9/21/2020 Telephone encounter with patient    Kevin VAZQUEZ [place], Med Ass't  You 3 hours ago (9:21 AM)        Spoke with pt and relayed your message. He does NOT want to stop the Cosentyx. He will just contiune on his meds and be hyper diligent about watching for skin cancers.          Documentation        You routed conversation to Kevin Londono Med Ass't 3 hours ago (9:12 AM)      You 3 hours ago (9:12 AM)        Please notify patient that with Humira, there is about a 3 to 5% risk of cancers in general including melanoma and other skin cancers.  This is the same risk as all other Biologics as well.  If patient wants to stop Humira that is okay he can cold turkey stop the humira     Please schedule follow-up appointment to discuss alternative options for patient.         Documentation        Kevin Londono Med Ass't routed conversation to You 4 hours ago (8:21 AM)      Kevin Londono Med Ass't 4 hours ago (8:21 AM)        Stas called to give you an FYI-      He had a melanoma removed from his shoulder. It had not metastasized. But he thought you needed to know about it with the medications he is taking.          Please advise and thank you         Documentation

## 2020-08-06 LAB — TEST NAME 95000: NORMAL

## 2020-08-14 ENCOUNTER — APPOINTMENT (RX ONLY)
Dept: URBAN - METROPOLITAN AREA CLINIC 20 | Facility: CLINIC | Age: 60
Setting detail: DERMATOLOGY
End: 2020-08-14

## 2020-08-14 DIAGNOSIS — D22 MELANOCYTIC NEVI: ICD-10-CM

## 2020-08-14 DIAGNOSIS — L40.0 PSORIASIS VULGARIS: ICD-10-CM

## 2020-08-14 DIAGNOSIS — L81.4 OTHER MELANIN HYPERPIGMENTATION: ICD-10-CM

## 2020-08-14 DIAGNOSIS — D18.0 HEMANGIOMA: ICD-10-CM

## 2020-08-14 DIAGNOSIS — L82.1 OTHER SEBORRHEIC KERATOSIS: ICD-10-CM

## 2020-08-14 PROBLEM — D48.5 NEOPLASM OF UNCERTAIN BEHAVIOR OF SKIN: Status: ACTIVE | Noted: 2020-08-14

## 2020-08-14 PROBLEM — D18.01 HEMANGIOMA OF SKIN AND SUBCUTANEOUS TISSUE: Status: ACTIVE | Noted: 2020-08-14

## 2020-08-14 PROBLEM — D22.5 MELANOCYTIC NEVI OF TRUNK: Status: ACTIVE | Noted: 2020-08-14

## 2020-08-14 PROCEDURE — ? COUNSELING

## 2020-08-14 PROCEDURE — ? BIOPSY BY SHAVE METHOD

## 2020-08-14 PROCEDURE — ? ADDITIONAL NOTES

## 2020-08-14 PROCEDURE — 99203 OFFICE O/P NEW LOW 30 MIN: CPT | Mod: 25

## 2020-08-14 PROCEDURE — 11103 TANGNTL BX SKIN EA SEP/ADDL: CPT

## 2020-08-14 PROCEDURE — ? PRESCRIPTION

## 2020-08-14 PROCEDURE — 11102 TANGNTL BX SKIN SINGLE LES: CPT

## 2020-08-14 RX ORDER — FLUOCINONIDE 0.5 MG/ML
SOLUTION TOPICAL BID
Qty: 1 | Refills: 5 | COMMUNITY
Start: 2020-08-14

## 2020-08-14 RX ADMIN — FLUOCINONIDE 1: 0.5 SOLUTION TOPICAL at 00:00

## 2020-08-14 ASSESSMENT — LOCATION SIMPLE DESCRIPTION DERM
LOCATION SIMPLE: RIGHT SHOULDER
LOCATION SIMPLE: ABDOMEN
LOCATION SIMPLE: RIGHT UPPER BACK
LOCATION SIMPLE: RIGHT HAND
LOCATION SIMPLE: LOWER BACK
LOCATION SIMPLE: LEFT LOWER BACK
LOCATION SIMPLE: CHEST
LOCATION SIMPLE: RIGHT FOREARM
LOCATION SIMPLE: RIGHT SCALP
LOCATION SIMPLE: LEFT SHOULDER

## 2020-08-14 ASSESSMENT — LOCATION ZONE DERM
LOCATION ZONE: TRUNK
LOCATION ZONE: SCALP
LOCATION ZONE: HAND
LOCATION ZONE: ARM

## 2020-08-14 ASSESSMENT — LOCATION DETAILED DESCRIPTION DERM
LOCATION DETAILED: RIGHT ANTERIOR SHOULDER
LOCATION DETAILED: LEFT ANTERIOR SHOULDER
LOCATION DETAILED: LEFT INFERIOR MEDIAL MIDBACK
LOCATION DETAILED: SUPERIOR LUMBAR SPINE
LOCATION DETAILED: LEFT POSTERIOR SHOULDER
LOCATION DETAILED: PERIUMBILICAL SKIN
LOCATION DETAILED: RIGHT PROXIMAL DORSAL FOREARM
LOCATION DETAILED: RIGHT POSTERIOR SHOULDER
LOCATION DETAILED: STERNUM
LOCATION DETAILED: RIGHT ULNAR DORSAL HAND
LOCATION DETAILED: RIGHT MEDIAL FRONTAL SCALP
LOCATION DETAILED: RIGHT SUPERIOR MEDIAL UPPER BACK

## 2020-08-14 NOTE — PROCEDURE: ADDITIONAL NOTES
Additional Notes: [facility]. This has led to control of his skin disease but has resistant plaque on scalp. Using tar shampoo. Has never tried topical steroids. Will rx fluocinonide.
Detail Level: Simple

## 2020-09-02 ENCOUNTER — APPOINTMENT (RX ONLY)
Dept: URBAN - METROPOLITAN AREA CLINIC 4 | Facility: CLINIC | Age: 60
Setting detail: DERMATOLOGY
End: 2020-09-02

## 2020-09-02 PROBLEM — C43.62 MALIGNANT MELANOMA OF LEFT UPPER LIMB, INCLUDING SHOULDER: Status: ACTIVE | Noted: 2020-09-02

## 2020-09-02 PROCEDURE — ? COUNSELING

## 2020-09-02 PROCEDURE — 11606 EXC TR-EXT MAL+MARG >4 CM: CPT

## 2020-09-02 PROCEDURE — ? DIAGNOSIS COMMENT

## 2020-09-02 PROCEDURE — ? EXCISION

## 2020-09-02 PROCEDURE — 13122 CMPLX RPR S/A/L ADDL 5 CM/>: CPT

## 2020-09-02 PROCEDURE — 13121 CMPLX RPR S/A/L 2.6-7.5 CM: CPT

## 2020-09-02 NOTE — PROCEDURE: MIPS QUALITY
Quality 138: Melanoma: Coordination Of Care: A treatment plan was communicated to the physicians providing continuing care within one month of diagnosis outlining: diagnosis, tumor thickness and a plan for surgery or alternate care.
Quality 226: Preventive Care And Screening: Tobacco Use: Screening And Cessation Intervention: Patient screened for tobacco use and is an ex/non-smoker
Detail Level: Detailed
Quality 397: Melanoma: Reporting: The pathology report includes a pT Category and statement on thickness and ulceration for pT1, mitotic rate.
Quality 130: Documentation Of Current Medications In The Medical Record: Current Medications Documented
Quality 137: Melanoma: Continuity Of Care - Recall System: Patient information entered into a recall system that includes: target date for the next exam specified AND a process to follow up with patients regarding missed or unscheduled appointments
Quality 265: Biopsy Follow-Up: Biopsy results reviewed, communicated, tracked, and documented

## 2020-09-02 NOTE — PROCEDURE: COUNSELING
Quality 137: Melanoma: Continuity Of Care - Recall System: Patient information entered into a recall system that includes: target date for the next exam specified AND a process to follow up with patients regarding missed or unscheduled appointments
Quality 397: Melanoma: Reporting: The pathology report includes a pT Category and statement on thickness and ulceration for pT1, mitotic rate.
Quality 138: Melanoma: Coordination Of Care: A treatment plan was communicated to the physicians providing continuing care within one month of diagnosis outlining: diagnosis, tumor thickness and a plan for surgery or alternate care.
Show Follow-Up Variable: Yes
When Should The Patient Follow-Up For Their Next Full-Body Skin Exam?: 3 Months
Detail Level: Detailed

## 2020-09-02 NOTE — PROCEDURE: DIAGNOSIS COMMENT
Detail Level: Simple
Comment: Invasive malignant melanoma, extending to a depth of approximately 0.5 mm

## 2020-09-02 NOTE — PROCEDURE: EXCISION
Surgeon (Optional): Michael
Biopsy Photograph Reviewed: Yes
Previous Accession (Optional): [id number]
Breslow Depth: 0.5
Size Of Lesion In Cm: 3.8
Size Of Margin In Cm: 1
Excision Method: Elliptical
Anesthesia Volume In Cc: 22
Did You Provide Opioid Counseling: No
Repair Type: Complex
Suturegard Retention Suture: 2-0 Nylon
Retention Suture Bite Size: 3 mm
Length To Time In Minutes Device Was In Place: 10
Intermediate / Complex Repair - Final Wound Length In Cm: 10.5
Complex/Intermediate Repair Variations: Crescentic
Width Of Defect Perpendicular To Closure In Cm (Required): 2.7
Distance Of Undermining In Cm (Required): 3.5
Undermining Type: Entire Wound
Debridement Text: The wound edges were debrided prior to proceeding with the closure to facilitate wound healing.
Helical Rim Text: The closure involved the helical rim.
Vermilion Border Text: The closure involved the vermilion border.
Nostril Rim Text: The closure involved the nostril rim.
Retention Suture Text: Retention sutures were placed to support the closure and prevent dehiscence.
Primary Defect Length (In Cm): 0
Lab: [id number]
Lab Facility: 
Graft Donor Site Bandage (Optional-Leave Blank If You Don't Want In Note): Steri-strips and a pressure bandage were applied to the donor site.
Epidermal Closure Graft Donor Site (Optional): simple interrupted
Billing Type: Third-Party Bill
Excision Depth: adipose tissue
Scalpel Size: 15 blade
Anesthesia Type: 1% lidocaine with epinephrine
Additional Anesthesia Volume In Cc: 6
Hemostasis: Electrocautery
Estimated Blood Loss (Cc): minimal
Detail Level: Detailed
Repair Anesthesia Method: local infiltration
Anesthesia Volume In Cc: 18.8
Undermining Location (Optional): in the fascial plane
Deep Sutures: 4-0 Vicryl
Additional Deep Sutures: 2-0 Vicryl
Dermal Closure: buried vertical mattress
Epidermal Sutures: 5-0 Vicryl Rapide
Additional Epidermal Sutures: 5-0 Caprosyn
Epidermal Closure: running locked
Wound Care: Bacitracin
Dressing: dry sterile dressing
Suturegard Intro: Intraoperative tissue expansion was performed, utilizing the SUTUREGARD device, in order to reduce wound tension.
Suturegard Body: The suture ends were repeatedly re-tightened and re-clamped to achieve the desired tissue expansion.
Hemigard Intro: Due to skin fragility and wound tension, it was decided to use HEMIGARD adhesive retention suture devices to permit a linear closure. The skin was cleaned and dried for a 6cm distance away from the wound. Excessive hair, if present, was removed to allow for adhesion.
Hemigard Postcare Instructions: The HEMIGARD strips are to remain completely dry for at least 5-7 days.
Positioning (Leave Blank If You Do Not Want): The patient was placed in a comfortable position exposing the surgical site.
Pre-Excision Curettage Text (Leave Blank If You Do Not Want): Prior to drawing the surgical margin the visible lesion was removed with electrodesiccation and curettage to clearly define the lesion size.
Complex Repair Preamble Text (Leave Blank If You Do Not Want): Extensive wide undermining was performed.
Intermediate Repair Preamble Text (Leave Blank If You Do Not Want): Undermining was performed with blunt dissection.
Curvilinear Excision Additional Text (Leave Blank If You Do Not Want): The margin was drawn around the clinically apparent lesion.  A curvilinear shape was then drawn on the skin incorporating the lesion and margins.  Incisions were then made along these lines to the appropriate tissue plane and the lesion was extirpated.
Fusiform Excision Additional Text (Leave Blank If You Do Not Want): The margin was drawn around the clinically apparent lesion.  A fusiform shape was then drawn on the skin incorporating the lesion and margins.  Incisions were then made along these lines to the appropriate tissue plane and the lesion was extirpated.
Elliptical Excision Additional Text (Leave Blank If You Do Not Want): The margin was drawn around the clinically apparent lesion.  An elliptical shape was then drawn on the skin incorporating the lesion and margins.  Incisions were then made along these lines to the appropriate tissue plane and the lesion was extirpated.
Saucerization Excision Additional Text (Leave Blank If You Do Not Want): The margin was drawn around the clinically apparent lesion.  Incisions were then made along these lines, in a tangential fashion, to the appropriate tissue plane and the lesion was extirpated.
Slit Excision Additional Text (Leave Blank If You Do Not Want): A linear line was drawn on the skin overlying the lesion. An incision was made slowly until the lesion was visualized.  Once visualized, the lesion was removed with blunt dissection.
Excisional Biopsy Additional Text (Leave Blank If You Do Not Want): The margin was drawn around the clinically apparent lesion. An elliptical shape was then drawn on the skin incorporating the lesion and margins.  Incisions were then made along these lines to the appropriate tissue plane and the lesion was extirpated.
Perilesional Excision Additional Text (Leave Blank If You Do Not Want): The margin was drawn around the clinically apparent lesion. Incisions were then made along these lines to the appropriate tissue plane and the lesion was extirpated.
Repair Performed By Another Provider Text (Leave Blank If You Do Not Want): After the tissue was excised the defect was repaired by another provider.
No Repair - Repaired With Adjacent Surgical Defect Text (Leave Blank If You Do Not Want): After the excision the defect was repaired concurrently with another surgical defect which was in close approximation.
Advancement Flap (Single) Text: The defect edges were debeveled with a #15 scalpel blade.  Given the location of the defect and the proximity to free margins a single advancement flap was deemed most appropriate.  Using a sterile surgical marker, an appropriate advancement flap was drawn incorporating the defect and placing the expected incisions within the relaxed skin tension lines where possible.    The area thus outlined was incised deep to adipose tissue with a #15 scalpel blade.  The skin margins were undermined to an appropriate distance in all directions utilizing iris scissors.
Advancement Flap (Double) Text: The defect edges were debeveled with a #15 scalpel blade.  Given the location of the defect and the proximity to free margins a double advancement flap was deemed most appropriate.  Using a sterile surgical marker, the appropriate advancement flaps were drawn incorporating the defect and placing the expected incisions within the relaxed skin tension lines where possible.    The area thus outlined was incised deep to adipose tissue with a #15 scalpel blade.  The skin margins were undermined to an appropriate distance in all directions utilizing iris scissors.
Burow's Advancement Flap Text: The defect edges were debeveled with a #15 scalpel blade.  Given the location of the defect and the proximity to free margins a Burow's advancement flap was deemed most appropriate.  Using a sterile surgical marker, the appropriate advancement flap was drawn incorporating the defect and placing the expected incisions within the relaxed skin tension lines where possible.    The area thus outlined was incised deep to adipose tissue with a #15 scalpel blade.  The skin margins were undermined to an appropriate distance in all directions utilizing iris scissors.
Chonodrocutaneous Helical Advancement Flap Text: The defect edges were debeveled with a #15 scalpel blade.  Given the location of the defect and the proximity to free margins a chondrocutaneous helical advancement flap was deemed most appropriate.  Using a sterile surgical marker, the appropriate advancement flap was drawn incorporating the defect and placing the expected incisions within the relaxed skin tension lines where possible.    The area thus outlined was incised deep to adipose tissue with a #15 scalpel blade.  The skin margins were undermined to an appropriate distance in all directions utilizing iris scissors.
Crescentic Advancement Flap Text: The defect edges were debeveled with a #15 scalpel blade.  Given the location of the defect and the proximity to free margins a crescentic advancement flap was deemed most appropriate.  Using a sterile surgical marker, the appropriate advancement flap was drawn incorporating the defect and placing the expected incisions within the relaxed skin tension lines where possible.    The area thus outlined was incised deep to adipose tissue with a #15 scalpel blade.  The skin margins were undermined to an appropriate distance in all directions utilizing iris scissors.
A-T Advancement Flap Text: The defect edges were debeveled with a #15 scalpel blade.  Given the location of the defect, shape of the defect and the proximity to free margins an A-T advancement flap was deemed most appropriate.  Using a sterile surgical marker, an appropriate advancement flap was drawn incorporating the defect and placing the expected incisions within the relaxed skin tension lines where possible.    The area thus outlined was incised deep to adipose tissue with a #15 scalpel blade.  The skin margins were undermined to an appropriate distance in all directions utilizing iris scissors.
O-T Advancement Flap Text: The defect edges were debeveled with a #15 scalpel blade.  Given the location of the defect, shape of the defect and the proximity to free margins an O-T advancement flap was deemed most appropriate.  Using a sterile surgical marker, an appropriate advancement flap was drawn incorporating the defect and placing the expected incisions within the relaxed skin tension lines where possible.    The area thus outlined was incised deep to adipose tissue with a #15 scalpel blade.  The skin margins were undermined to an appropriate distance in all directions utilizing iris scissors.
O-L Flap Text: The defect edges were debeveled with a #15 scalpel blade.  Given the location of the defect, shape of the defect and the proximity to free margins an O-L flap was deemed most appropriate.  Using a sterile surgical marker, an appropriate advancement flap was drawn incorporating the defect and placing the expected incisions within the relaxed skin tension lines where possible.    The area thus outlined was incised deep to adipose tissue with a #15 scalpel blade.  The skin margins were undermined to an appropriate distance in all directions utilizing iris scissors.
O-Z Flap Text: The defect edges were debeveled with a #15 scalpel blade.  Given the location of the defect, shape of the defect and the proximity to free margins an O-Z flap was deemed most appropriate.  Using a sterile surgical marker, an appropriate transposition flap was drawn incorporating the defect and placing the expected incisions within the relaxed skin tension lines where possible. The area thus outlined was incised deep to adipose tissue with a #15 scalpel blade.  The skin margins were undermined to an appropriate distance in all directions utilizing iris scissors.
Double O-Z Flap Text: The defect edges were debeveled with a #15 scalpel blade.  Given the location of the defect, shape of the defect and the proximity to free margins a Double O-Z flap was deemed most appropriate.  Using a sterile surgical marker, an appropriate transposition flap was drawn incorporating the defect and placing the expected incisions within the relaxed skin tension lines where possible. The area thus outlined was incised deep to adipose tissue with a #15 scalpel blade.  The skin margins were undermined to an appropriate distance in all directions utilizing iris scissors.
V-Y Flap Text: The defect edges were debeveled with a #15 scalpel blade.  Given the location of the defect, shape of the defect and the proximity to free margins a V-Y flap was deemed most appropriate.  Using a sterile surgical marker, an appropriate advancement flap was drawn incorporating the defect and placing the expected incisions within the relaxed skin tension lines where possible.    The area thus outlined was incised deep to adipose tissue with a #15 scalpel blade.  The skin margins were undermined to an appropriate distance in all directions utilizing iris scissors.
Advancement-Rotation Flap Text: The defect edges were debeveled with a #15 scalpel blade.  Given the location of the defect, shape of the defect and the proximity to free margins an advancement-rotation flap was deemed most appropriate.  Using a sterile surgical marker, an appropriate flap was drawn incorporating the defect and placing the expected incisions within the relaxed skin tension lines where possible. The area thus outlined was incised deep to adipose tissue with a #15 scalpel blade.  The skin margins were undermined to an appropriate distance in all directions utilizing iris scissors.
Mercedes Flap Text: The defect edges were debeveled with a #15 scalpel blade.  Given the location of the defect, shape of the defect and the proximity to free margins a Mercedes flap was deemed most appropriate.  Using a sterile surgical marker, an appropriate advancement flap was drawn incorporating the defect and placing the expected incisions within the relaxed skin tension lines where possible. The area thus outlined was incised deep to adipose tissue with a #15 scalpel blade.  The skin margins were undermined to an appropriate distance in all directions utilizing iris scissors.
Modified Advancement Flap Text: The defect edges were debeveled with a #15 scalpel blade.  Given the location of the defect, shape of the defect and the proximity to free margins a modified advancement flap was deemed most appropriate.  Using a sterile surgical marker, an appropriate advancement flap was drawn incorporating the defect and placing the expected incisions within the relaxed skin tension lines where possible.    The area thus outlined was incised deep to adipose tissue with a #15 scalpel blade.  The skin margins were undermined to an appropriate distance in all directions utilizing iris scissors.
Mucosal Advancement Flap Text: Given the location of the defect, shape of the defect and the proximity to free margins a mucosal advancement flap was deemed most appropriate. Incisions were made with a 15 blade scalpel in the appropriate fashion along the cutaneous vermilion border and the mucosal lip. The remaining actinically damaged mucosal tissue was excised.  The mucosal advancement flap was then elevated to the gingival sulcus with care taken to preserve the neurovascular structures and advanced into the primary defect. Care was taken to ensure that precise realignment of the vermilion border was achieved.
Peng Advancement Flap Text: The defect edges were debeveled with a #15 scalpel blade.  Given the location of the defect, shape of the defect and the proximity to free margins a Peng advancement flap was deemed most appropriate.  Using a sterile surgical marker, an appropriate advancement flap was drawn incorporating the defect and placing the expected incisions within the relaxed skin tension lines where possible. The area thus outlined was incised deep to adipose tissue with a #15 scalpel blade.  The skin margins were undermined to an appropriate distance in all directions utilizing iris scissors.
Hatchet Flap Text: The defect edges were debeveled with a #15 scalpel blade.  Given the location of the defect, shape of the defect and the proximity to free margins a hatchet flap was deemed most appropriate.  Using a sterile surgical marker, an appropriate hatchet flap was drawn incorporating the defect and placing the expected incisions within the relaxed skin tension lines where possible.    The area thus outlined was incised deep to adipose tissue with a #15 scalpel blade.  The skin margins were undermined to an appropriate distance in all directions utilizing iris scissors.
Rotation Flap Text: The defect edges were debeveled with a #15 scalpel blade.  Given the location of the defect, shape of the defect and the proximity to free margins a rotation flap was deemed most appropriate.  Using a sterile surgical marker, an appropriate rotation flap was drawn incorporating the defect and placing the expected incisions within the relaxed skin tension lines where possible.    The area thus outlined was incised deep to adipose tissue with a #15 scalpel blade.  The skin margins were undermined to an appropriate distance in all directions utilizing iris scissors.
Spiral Flap Text: The defect edges were debeveled with a #15 scalpel blade.  Given the location of the defect, shape of the defect and the proximity to free margins a spiral flap was deemed most appropriate.  Using a sterile surgical marker, an appropriate rotation flap was drawn incorporating the defect and placing the expected incisions within the relaxed skin tension lines where possible. The area thus outlined was incised deep to adipose tissue with a #15 scalpel blade.  The skin margins were undermined to an appropriate distance in all directions utilizing iris scissors.
Star Wedge Flap Text: The defect edges were debeveled with a #15 scalpel blade.  Given the location of the defect, shape of the defect and the proximity to free margins a star wedge flap was deemed most appropriate.  Using a sterile surgical marker, an appropriate rotation flap was drawn incorporating the defect and placing the expected incisions within the relaxed skin tension lines where possible. The area thus outlined was incised deep to adipose tissue with a #15 scalpel blade.  The skin margins were undermined to an appropriate distance in all directions utilizing iris scissors.
Transposition Flap Text: The defect edges were debeveled with a #15 scalpel blade.  Given the location of the defect and the proximity to free margins a transposition flap was deemed most appropriate.  Using a sterile surgical marker, an appropriate transposition flap was drawn incorporating the defect.    The area thus outlined was incised deep to adipose tissue with a #15 scalpel blade.  The skin margins were undermined to an appropriate distance in all directions utilizing iris scissors.
Muscle Hinge Flap Text: The defect edges were debeveled with a #15 scalpel blade.  Given the size, depth and location of the defect and the proximity to free margins a muscle hinge flap was deemed most appropriate.  Using a sterile surgical marker, an appropriate hinge flap was drawn incorporating the defect. The area thus outlined was incised with a #15 scalpel blade.  The skin margins were undermined to an appropriate distance in all directions utilizing iris scissors.
Melolabial Transposition Flap Text: The defect edges were debeveled with a #15 scalpel blade.  Given the location of the defect and the proximity to free margins a melolabial flap was deemed most appropriate.  Using a sterile surgical marker, an appropriate melolabial transposition flap was drawn incorporating the defect.    The area thus outlined was incised deep to adipose tissue with a #15 scalpel blade.  The skin margins were undermined to an appropriate distance in all directions utilizing iris scissors.
Rhombic Flap Text: The defect edges were debeveled with a #15 scalpel blade.  Given the location of the defect and the proximity to free margins a rhombic flap was deemed most appropriate.  Using a sterile surgical marker, an appropriate rhombic flap was drawn incorporating the defect.    The area thus outlined was incised deep to adipose tissue with a #15 scalpel blade.  The skin margins were undermined to an appropriate distance in all directions utilizing iris scissors.
Rhomboid Transposition Flap Text: The defect edges were debeveled with a #15 scalpel blade.  Given the location of the defect and the proximity to free margins a rhomboid transposition flap was deemed most appropriate.  Using a sterile surgical marker, an appropriate rhomboid flap was drawn incorporating the defect.    The area thus outlined was incised deep to adipose tissue with a #15 scalpel blade.  The skin margins were undermined to an appropriate distance in all directions utilizing iris scissors.
Bi-Rhombic Flap Text: The defect edges were debeveled with a #15 scalpel blade.  Given the location of the defect and the proximity to free margins a bi-rhombic flap was deemed most appropriate.  Using a sterile surgical marker, an appropriate rhombic flap was drawn incorporating the defect. The area thus outlined was incised deep to adipose tissue with a #15 scalpel blade.  The skin margins were undermined to an appropriate distance in all directions utilizing iris scissors.
Helical Rim Advancement Flap Text: The defect edges were debeveled with a #15 blade scalpel.  Given the location of the defect and the proximity to free margins (helical rim) a double helical rim advancement flap was deemed most appropriate.  Using a sterile surgical marker, the appropriate advancement flaps were drawn incorporating the defect and placing the expected incisions between the helical rim and antihelix where possible.  The area thus outlined was incised through and through with a #15 scalpel blade.  With a skin hook and iris scissors, the flaps were gently and sharply undermined and freed up.
Bilateral Helical Rim Advancement Flap Text: The defect edges were debeveled with a #15 blade scalpel.  Given the location of the defect and the proximity to free margins (helical rim) a bilateral helical rim advancement flap was deemed most appropriate.  Using a sterile surgical marker, the appropriate advancement flaps were drawn incorporating the defect and placing the expected incisions between the helical rim and antihelix where possible.  The area thus outlined was incised through and through with a #15 scalpel blade.  With a skin hook and iris scissors, the flaps were gently and sharply undermined and freed up.
Ear Star Wedge Flap Text: The defect edges were debeveled with a #15 blade scalpel.  Given the location of the defect and the proximity to free margins (helical rim) an ear star wedge flap was deemed most appropriate.  Using a sterile surgical marker, the appropriate flap was drawn incorporating the defect and placing the expected incisions between the helical rim and antihelix where possible.  The area thus outlined was incised through and through with a #15 scalpel blade.
Banner Transposition Flap Text: The defect edges were debeveled with a #15 scalpel blade.  Given the location of the defect and the proximity to free margins a Banner transposition flap was deemed most appropriate.  Using a sterile surgical marker, an appropriate flap drawn around the defect. The area thus outlined was incised deep to adipose tissue with a #15 scalpel blade.  The skin margins were undermined to an appropriate distance in all directions utilizing iris scissors.
Bilobed Flap Text: The defect edges were debeveled with a #15 scalpel blade.  Given the location of the defect and the proximity to free margins a bilobe flap was deemed most appropriate.  Using a sterile surgical marker, an appropriate bilobe flap drawn around the defect.    The area thus outlined was incised deep to adipose tissue with a #15 scalpel blade.  The skin margins were undermined to an appropriate distance in all directions utilizing iris scissors.
Bilobed Transposition Flap Text: The defect edges were debeveled with a #15 scalpel blade.  Given the location of the defect and the proximity to free margins a bilobed transposition flap was deemed most appropriate.  Using a sterile surgical marker, an appropriate bilobe flap drawn around the defect.    The area thus outlined was incised deep to adipose tissue with a #15 scalpel blade.  The skin margins were undermined to an appropriate distance in all directions utilizing iris scissors.
Trilobed Flap Text: The defect edges were debeveled with a #15 scalpel blade.  Given the location of the defect and the proximity to free margins a trilobed flap was deemed most appropriate.  Using a sterile surgical marker, an appropriate trilobed flap drawn around the defect.    The area thus outlined was incised deep to adipose tissue with a #15 scalpel blade.  The skin margins were undermined to an appropriate distance in all directions utilizing iris scissors.
Dorsal Nasal Flap Text: The defect edges were debeveled with a #15 scalpel blade.  Given the location of the defect and the proximity to free margins a dorsal nasal flap was deemed most appropriate.  Using a sterile surgical marker, an appropriate dorsal nasal flap was drawn around the defect.    The area thus outlined was incised deep to adipose tissue with a #15 scalpel blade.  The skin margins were undermined to an appropriate distance in all directions utilizing iris scissors.
Island Pedicle Flap Text: The defect edges were debeveled with a #15 scalpel blade.  Given the location of the defect, shape of the defect and the proximity to free margins an island pedicle advancement flap was deemed most appropriate.  Using a sterile surgical marker, an appropriate advancement flap was drawn incorporating the defect, outlining the appropriate donor tissue and placing the expected incisions within the relaxed skin tension lines where possible.    The area thus outlined was incised deep to adipose tissue with a #15 scalpel blade.  The skin margins were undermined to an appropriate distance in all directions around the primary defect and laterally outward around the island pedicle utilizing iris scissors.  There was minimal undermining beneath the pedicle flap.
Island Pedicle Flap With Canthal Suspension Text: The defect edges were debeveled with a #15 scalpel blade.  Given the location of the defect, shape of the defect and the proximity to free margins an island pedicle advancement flap was deemed most appropriate.  Using a sterile surgical marker, an appropriate advancement flap was drawn incorporating the defect, outlining the appropriate donor tissue and placing the expected incisions within the relaxed skin tension lines where possible. The area thus outlined was incised deep to adipose tissue with a #15 scalpel blade.  The skin margins were undermined to an appropriate distance in all directions around the primary defect and laterally outward around the island pedicle utilizing iris scissors.  There was minimal undermining beneath the pedicle flap. A suspension suture was placed in the canthal tendon to prevent tension and prevent ectropion.
Alar Island Pedicle Flap Text: The defect edges were debeveled with a #15 scalpel blade.  Given the location of the defect, shape of the defect and the proximity to the alar rim an island pedicle advancement flap was deemed most appropriate.  Using a sterile surgical marker, an appropriate advancement flap was drawn incorporating the defect, outlining the appropriate donor tissue and placing the expected incisions within the nasal ala running parallel to the alar rim. The area thus outlined was incised with a #15 scalpel blade.  The skin margins were undermined minimally to an appropriate distance in all directions around the primary defect and laterally outward around the island pedicle utilizing iris scissors.  There was minimal undermining beneath the pedicle flap.
Double Island Pedicle Flap Text: The defect edges were debeveled with a #15 scalpel blade.  Given the location of the defect, shape of the defect and the proximity to free margins a double island pedicle advancement flap was deemed most appropriate.  Using a sterile surgical marker, an appropriate advancement flap was drawn incorporating the defect, outlining the appropriate donor tissue and placing the expected incisions within the relaxed skin tension lines where possible.    The area thus outlined was incised deep to adipose tissue with a #15 scalpel blade.  The skin margins were undermined to an appropriate distance in all directions around the primary defect and laterally outward around the island pedicle utilizing iris scissors.  There was minimal undermining beneath the pedicle flap.
Island Pedicle Flap-Requiring Vessel Identification Text: The defect edges were debeveled with a #15 scalpel blade.  Given the location of the defect, shape of the defect and the proximity to free margins an island pedicle advancement flap was deemed most appropriate.  Using a sterile surgical marker, an appropriate advancement flap was drawn, based on the axial vessel mentioned above, incorporating the defect, outlining the appropriate donor tissue and placing the expected incisions within the relaxed skin tension lines where possible.    The area thus outlined was incised deep to adipose tissue with a #15 scalpel blade.  The skin margins were undermined to an appropriate distance in all directions around the primary defect and laterally outward around the island pedicle utilizing iris scissors.  There was minimal undermining beneath the pedicle flap.
Keystone Flap Text: The defect edges were debeveled with a #15 scalpel blade.  Given the location of the defect, shape of the defect a keystone flap was deemed most appropriate.  Using a sterile surgical marker, an appropriate keystone flap was drawn incorporating the defect, outlining the appropriate donor tissue and placing the expected incisions within the relaxed skin tension lines where possible. The area thus outlined was incised deep to adipose tissue with a #15 scalpel blade.  The skin margins were undermined to an appropriate distance in all directions around the primary defect and laterally outward around the flap utilizing iris scissors.
O-T Plasty Text: The defect edges were debeveled with a #15 scalpel blade.  Given the location of the defect, shape of the defect and the proximity to free margins an O-T plasty was deemed most appropriate.  Using a sterile surgical marker, an appropriate O-T plasty was drawn incorporating the defect and placing the expected incisions within the relaxed skin tension lines where possible.    The area thus outlined was incised deep to adipose tissue with a #15 scalpel blade.  The skin margins were undermined to an appropriate distance in all directions utilizing iris scissors.
O-Z Plasty Text: The defect edges were debeveled with a #15 scalpel blade.  Given the location of the defect, shape of the defect and the proximity to free margins an O-Z plasty (double transposition flap) was deemed most appropriate.  Using a sterile surgical marker, the appropriate transposition flaps were drawn incorporating the defect and placing the expected incisions within the relaxed skin tension lines where possible.    The area thus outlined was incised deep to adipose tissue with a #15 scalpel blade.  The skin margins were undermined to an appropriate distance in all directions utilizing iris scissors.  Hemostasis was achieved with electrocautery.  The flaps were then transposed into place, one clockwise and the other counterclockwise, and anchored with interrupted buried subcutaneous sutures.
Double O-Z Plasty Text: The defect edges were debeveled with a #15 scalpel blade.  Given the location of the defect, shape of the defect and the proximity to free margins a Double O-Z plasty (double transposition flap) was deemed most appropriate.  Using a sterile surgical marker, the appropriate transposition flaps were drawn incorporating the defect and placing the expected incisions within the relaxed skin tension lines where possible. The area thus outlined was incised deep to adipose tissue with a #15 scalpel blade.  The skin margins were undermined to an appropriate distance in all directions utilizing iris scissors.  Hemostasis was achieved with electrocautery.  The flaps were then transposed into place, one clockwise and the other counterclockwise, and anchored with interrupted buried subcutaneous sutures.
V-Y Plasty Text: The defect edges were debeveled with a #15 scalpel blade.  Given the location of the defect, shape of the defect and the proximity to free margins an V-Y advancement flap was deemed most appropriate.  Using a sterile surgical marker, an appropriate advancement flap was drawn incorporating the defect and placing the expected incisions within the relaxed skin tension lines where possible.    The area thus outlined was incised deep to adipose tissue with a #15 scalpel blade.  The skin margins were undermined to an appropriate distance in all directions utilizing iris scissors.
H Plasty Text: Given the location of the defect, shape of the defect and the proximity to free margins a H-plasty was deemed most appropriate for repair.  Using a sterile surgical marker, the appropriate advancement arms of the H-plasty were drawn incorporating the defect and placing the expected incisions within the relaxed skin tension lines where possible. The area thus outlined was incised deep to adipose tissue with a #15 scalpel blade. The skin margins were undermined to an appropriate distance in all directions utilizing iris scissors.  The opposing advancement arms were then advanced into place in opposite direction and anchored with interrupted buried subcutaneous sutures.
W Plasty Text: The lesion was extirpated to the level of the fat with a #15 scalpel blade.  Given the location of the defect, shape of the defect and the proximity to free margins a W-plasty was deemed most appropriate for repair.  Using a sterile surgical marker, the appropriate transposition arms of the W-plasty were drawn incorporating the defect and placing the expected incisions within the relaxed skin tension lines where possible.    The area thus outlined was incised deep to adipose tissue with a #15 scalpel blade.  The skin margins were undermined to an appropriate distance in all directions utilizing iris scissors.  The opposing transposition arms were then transposed into place in opposite direction and anchored with interrupted buried subcutaneous sutures.
Z Plasty Text: The lesion was extirpated to the level of the fat with a #15 scalpel blade.  Given the location of the defect, shape of the defect and the proximity to free margins a Z-plasty was deemed most appropriate for repair.  Using a sterile surgical marker, the appropriate transposition arms of the Z-plasty were drawn incorporating the defect and placing the expected incisions within the relaxed skin tension lines where possible.    The area thus outlined was incised deep to adipose tissue with a #15 scalpel blade.  The skin margins were undermined to an appropriate distance in all directions utilizing iris scissors.  The opposing transposition arms were then transposed into place in opposite direction and anchored with interrupted buried subcutaneous sutures.
Zygomaticofacial Flap Text: Given the location of the defect, shape of the defect and the proximity to free margins a zygomaticofacial flap was deemed most appropriate for repair.  Using a sterile surgical marker, the appropriate flap was drawn incorporating the defect and placing the expected incisions within the relaxed skin tension lines where possible. The area thus outlined was incised deep to adipose tissue with a #15 scalpel blade with preservation of a vascular pedicle.  The skin margins were undermined to an appropriate distance in all directions utilizing iris scissors.  The flap was then placed into the defect and anchored with interrupted buried subcutaneous sutures.
Cheek Interpolation Flap Text: A decision was made to reconstruct the defect utilizing an interpolation axial flap and a staged reconstruction.  A telfa template was made of the defect.  This telfa template was then used to outline the Cheek Interpolation flap.  The donor area for the pedicle flap was then injected with anesthesia.  The flap was excised through the skin and subcutaneous tissue down to the layer of the underlying musculature.  The interpolation flap was carefully excised within this deep plane to maintain its blood supply.  The edges of the donor site were undermined.   The donor site was closed in a primary fashion.  The pedicle was then rotated into position and sutured.  Once the tube was sutured into place, adequate blood supply was confirmed with blanching and refill.  The pedicle was then wrapped with xeroform gauze and dressed appropriately with a telfa and gauze bandage to ensure continued blood supply and protect the attached pedicle.
Cheek-To-Nose Interpolation Flap Text: A decision was made to reconstruct the defect utilizing an interpolation axial flap and a staged reconstruction.  A telfa template was made of the defect.  This telfa template was then used to outline the Cheek-To-Nose Interpolation flap.  The donor area for the pedicle flap was then injected with anesthesia.  The flap was excised through the skin and subcutaneous tissue down to the layer of the underlying musculature.  The interpolation flap was carefully excised within this deep plane to maintain its blood supply.  The edges of the donor site were undermined.   The donor site was closed in a primary fashion.  The pedicle was then rotated into position and sutured.  Once the tube was sutured into place, adequate blood supply was confirmed with blanching and refill.  The pedicle was then wrapped with xeroform gauze and dressed appropriately with a telfa and gauze bandage to ensure continued blood supply and protect the attached pedicle.
Interpolation Flap Text: A decision was made to reconstruct the defect utilizing an interpolation axial flap and a staged reconstruction.  A telfa template was made of the defect.  This telfa template was then used to outline the interpolation flap.  The donor area for the pedicle flap was then injected with anesthesia.  The flap was excised through the skin and subcutaneous tissue down to the layer of the underlying musculature.  The interpolation flap was carefully excised within this deep plane to maintain its blood supply.  The edges of the donor site were undermined.   The donor site was closed in a primary fashion.  The pedicle was then rotated into position and sutured.  Once the tube was sutured into place, adequate blood supply was confirmed with blanching and refill.  The pedicle was then wrapped with xeroform gauze and dressed appropriately with a telfa and gauze bandage to ensure continued blood supply and protect the attached pedicle.
Melolabial Interpolation Flap Text: A decision was made to reconstruct the defect utilizing an interpolation axial flap and a staged reconstruction.  A telfa template was made of the defect.  This telfa template was then used to outline the melolabial interpolation flap.  The donor area for the pedicle flap was then injected with anesthesia.  The flap was excised through the skin and subcutaneous tissue down to the layer of the underlying musculature.  The pedicle flap was carefully excised within this deep plane to maintain its blood supply.  The edges of the donor site were undermined.   The donor site was closed in a primary fashion.  The pedicle was then rotated into position and sutured.  Once the tube was sutured into place, adequate blood supply was confirmed with blanching and refill.  The pedicle was then wrapped with xeroform gauze and dressed appropriately with a telfa and gauze bandage to ensure continued blood supply and protect the attached pedicle.
Mastoid Interpolation Flap Text: A decision was made to reconstruct the defect utilizing an interpolation axial flap and a staged reconstruction.  A telfa template was made of the defect.  This telfa template was then used to outline the mastoid interpolation flap.  The donor area for the pedicle flap was then injected with anesthesia.  The flap was excised through the skin and subcutaneous tissue down to the layer of the underlying musculature.  The pedicle flap was carefully excised within this deep plane to maintain its blood supply.  The edges of the donor site were undermined.   The donor site was closed in a primary fashion.  The pedicle was then rotated into position and sutured.  Once the tube was sutured into place, adequate blood supply was confirmed with blanching and refill.  The pedicle was then wrapped with xeroform gauze and dressed appropriately with a telfa and gauze bandage to ensure continued blood supply and protect the attached pedicle.
Posterior Auricular Interpolation Flap Text: A decision was made to reconstruct the defect utilizing an interpolation axial flap and a staged reconstruction.  A telfa template was made of the defect.  This telfa template was then used to outline the posterior auricular interpolation flap.  The donor area for the pedicle flap was then injected with anesthesia.  The flap was excised through the skin and subcutaneous tissue down to the layer of the underlying musculature.  The pedicle flap was carefully excised within this deep plane to maintain its blood supply.  The edges of the donor site were undermined.   The donor site was closed in a primary fashion.  The pedicle was then rotated into position and sutured.  Once the tube was sutured into place, adequate blood supply was confirmed with blanching and refill.  The pedicle was then wrapped with xeroform gauze and dressed appropriately with a telfa and gauze bandage to ensure continued blood supply and protect the attached pedicle.
Paramedian Forehead Flap Text: A decision was made to reconstruct the defect utilizing an interpolation axial flap and a staged reconstruction.  A telfa template was made of the defect.  This telfa template was then used to outline the paramedian forehead pedicle flap.  The donor area for the pedicle flap was then injected with anesthesia.  The flap was excised through the skin and subcutaneous tissue down to the layer of the underlying musculature.  The pedicle flap was carefully excised within this deep plane to maintain its blood supply.  The edges of the donor site were undermined.   The donor site was closed in a primary fashion.  The pedicle was then rotated into position and sutured.  Once the tube was sutured into place, adequate blood supply was confirmed with blanching and refill.  The pedicle was then wrapped with xeroform gauze and dressed appropriately with a telfa and gauze bandage to ensure continued blood supply and protect the attached pedicle.
Lip Wedge Excision Repair Text: Given the location of the defect and the proximity to free margins a full thickness wedge repair was deemed most appropriate.  Using a sterile surgical marker, the appropriate repair was drawn incorporating the defect and placing the expected incisions perpendicular to the vermilion border.  The vermilion border was also meticulously outlined to ensure appropriate reapproximation during the repair.  The area thus outlined was incised through and through with a #15 scalpel blade.  The muscularis and dermis were reaproximated with deep sutures following hemostasis. Care was taken to realign the vermilion border before proceeding with the superficial closure.  Once the vermilion was realigned the superfical and mucosal closure was finished.
Ftsg Text: The defect edges were debeveled with a #15 scalpel blade.  Given the location of the defect, shape of the defect and the proximity to free margins a full thickness skin graft was deemed most appropriate.  Using a sterile surgical marker, the primary defect shape was transferred to the donor site. The area thus outlined was incised deep to adipose tissue with a #15 scalpel blade.  The harvested graft was then trimmed of adipose tissue until only dermis and epidermis was left.  The skin margins of the secondary defect were undermined to an appropriate distance in all directions utilizing iris scissors.  The secondary defect was closed with interrupted buried subcutaneous sutures.  The skin edges were then re-apposed with running  sutures.  The skin graft was then placed in the primary defect and oriented appropriately.
Split-Thickness Skin Graft Text: The defect edges were debeveled with a #15 scalpel blade.  Given the location of the defect, shape of the defect and the proximity to free margins a split thickness skin graft was deemed most appropriate.  Using a sterile surgical marker, the primary defect shape was transferred to the donor site. The split thickness graft was then harvested.  The skin graft was then placed in the primary defect and oriented appropriately.
Burow's Graft Text: The defect edges were debeveled with a #15 scalpel blade.  Given the location of the defect, shape of the defect, the proximity to free margins and the presence of a standing cone deformity a Burow's skin graft was deemed most appropriate. The standing cone was removed and this tissue was then trimmed to the shape of the primary defect. The adipose tissue was also removed until only dermis and epidermis were left.  The skin margins of the secondary defect were undermined to an appropriate distance in all directions utilizing iris scissors.  The secondary defect was closed with interrupted buried subcutaneous sutures.  The skin edges were then re-apposed with running  sutures.  The skin graft was then placed in the primary defect and oriented appropriately.
Cartilage Graft Text: The defect edges were debeveled with a #15 scalpel blade.  Given the location of the defect, shape of the defect, the fact the defect involved a full thickness cartilage defect a cartilage graft was deemed most appropriate.  An appropriate donor site was identified, cleansed, and anesthetized. The cartilage graft was then harvested and transferred to the recipient site, oriented appropriately and then sutured into place.  The secondary defect was then repaired using a primary closure.
Composite Graft Text: The defect edges were debeveled with a #15 scalpel blade.  Given the location of the defect, shape of the defect, the proximity to free margins and the fact the defect was full thickness a composite graft was deemed most appropriate.  The defect was outline and then transferred to the donor site.  A full thickness graft was then excised from the donor site. The graft was then placed in the primary defect, oriented appropriately and then sutured into place.  The secondary defect was then repaired using a primary closure.
Epidermal Autograft Text: The defect edges were debeveled with a #15 scalpel blade.  Given the location of the defect, shape of the defect and the proximity to free margins an epidermal autograft was deemed most appropriate.  Using a sterile surgical marker, the primary defect shape was transferred to the donor site. The epidermal graft was then harvested.  The skin graft was then placed in the primary defect and oriented appropriately.
Dermal Autograft Text: The defect edges were debeveled with a #15 scalpel blade.  Given the location of the defect, shape of the defect and the proximity to free margins a dermal autograft was deemed most appropriate.  Using a sterile surgical marker, the primary defect shape was transferred to the donor site. The area thus outlined was incised deep to adipose tissue with a #15 scalpel blade.  The harvested graft was then trimmed of adipose and epidermal tissue until only dermis was left.  The skin graft was then placed in the primary defect and oriented appropriately.
Skin Substitute Text: The defect edges were debeveled with a #15 scalpel blade.  Given the location of the defect, shape of the defect and the proximity to free margins a skin substitute graft was deemed most appropriate.  The graft material was trimmed to fit the size of the defect. The graft was then placed in the primary defect and oriented appropriately.
Tissue Cultured Epidermal Autograft Text: The defect edges were debeveled with a #15 scalpel blade.  Given the location of the defect, shape of the defect and the proximity to free margins a tissue cultured epidermal autograft was deemed most appropriate.  The graft was then trimmed to fit the size of the defect.  The graft was then placed in the primary defect and oriented appropriately.
Xenograft Text: The defect edges were debeveled with a #15 scalpel blade.  Given the location of the defect, shape of the defect and the proximity to free margins a xenograft was deemed most appropriate.  The graft was then trimmed to fit the size of the defect.  The graft was then placed in the primary defect and oriented appropriately.
Purse String (Intermediate) Text: Given the location of the defect and the characteristics of the surrounding skin a purse string intermediate closure was deemed most appropriate.  Undermining was performed circumfirentially around the surgical defect.  A purse string suture was then placed and tightened.
Purse String (Simple) Text: Given the location of the defect and the characteristics of the surrounding skin a purse string simple closure was deemed most appropriate.  Undermining was performed circumferentially around the surgical defect.  A purse string suture was then placed and tightened.
Partial Purse String (Intermediate) Text: Given the location of the defect and the characteristics of the surrounding skin an intermediate purse string closure was deemed most appropriate.  Undermining was performed circumferentially around the surgical defect.  A purse string suture was then placed and tightened. Wound tension of the circular defect prevented complete closure of the wound.
Partial Purse String (Simple) Text: Given the location of the defect and the characteristics of the surrounding skin a simple purse string closure was deemed most appropriate.  Undermining was performed circumferentially around the surgical defect.  A purse string suture was then placed and tightened. Wound tension of the circular defect prevented complete closure of the wound.
Complex Repair And Single Advancement Flap Text: The defect edges were debeveled with a #15 scalpel blade.  The primary defect was closed partially with a complex linear closure.  Given the location of the remaining defect, shape of the defect and the proximity to free margins a single advancement flap was deemed most appropriate for complete closure of the defect.  Using a sterile surgical marker, an appropriate advancement flap was drawn incorporating the defect and placing the expected incisions within the relaxed skin tension lines where possible.    The area thus outlined was incised deep to adipose tissue with a #15 scalpel blade.  The skin margins were undermined to an appropriate distance in all directions utilizing iris scissors.
Complex Repair And Double Advancement Flap Text: The defect edges were debeveled with a #15 scalpel blade.  The primary defect was closed partially with a complex linear closure.  Given the location of the remaining defect, shape of the defect and the proximity to free margins a double advancement flap was deemed most appropriate for complete closure of the defect.  Using a sterile surgical marker, an appropriate advancement flap was drawn incorporating the defect and placing the expected incisions within the relaxed skin tension lines where possible.    The area thus outlined was incised deep to adipose tissue with a #15 scalpel blade.  The skin margins were undermined to an appropriate distance in all directions utilizing iris scissors.
Complex Repair And Modified Advancement Flap Text: The defect edges were debeveled with a #15 scalpel blade.  The primary defect was closed partially with a complex linear closure.  Given the location of the remaining defect, shape of the defect and the proximity to free margins a modified advancement flap was deemed most appropriate for complete closure of the defect.  Using a sterile surgical marker, an appropriate advancement flap was drawn incorporating the defect and placing the expected incisions within the relaxed skin tension lines where possible.    The area thus outlined was incised deep to adipose tissue with a #15 scalpel blade.  The skin margins were undermined to an appropriate distance in all directions utilizing iris scissors.
Complex Repair And A-T Advancement Flap Text: The defect edges were debeveled with a #15 scalpel blade.  The primary defect was closed partially with a complex linear closure.  Given the location of the remaining defect, shape of the defect and the proximity to free margins an A-T advancement flap was deemed most appropriate for complete closure of the defect.  Using a sterile surgical marker, an appropriate advancement flap was drawn incorporating the defect and placing the expected incisions within the relaxed skin tension lines where possible.    The area thus outlined was incised deep to adipose tissue with a #15 scalpel blade.  The skin margins were undermined to an appropriate distance in all directions utilizing iris scissors.
Complex Repair And O-T Advancement Flap Text: The defect edges were debeveled with a #15 scalpel blade.  The primary defect was closed partially with a complex linear closure.  Given the location of the remaining defect, shape of the defect and the proximity to free margins an O-T advancement flap was deemed most appropriate for complete closure of the defect.  Using a sterile surgical marker, an appropriate advancement flap was drawn incorporating the defect and placing the expected incisions within the relaxed skin tension lines where possible.    The area thus outlined was incised deep to adipose tissue with a #15 scalpel blade.  The skin margins were undermined to an appropriate distance in all directions utilizing iris scissors.
Complex Repair And O-L Flap Text: The defect edges were debeveled with a #15 scalpel blade.  The primary defect was closed partially with a complex linear closure.  Given the location of the remaining defect, shape of the defect and the proximity to free margins an O-L flap was deemed most appropriate for complete closure of the defect.  Using a sterile surgical marker, an appropriate flap was drawn incorporating the defect and placing the expected incisions within the relaxed skin tension lines where possible.    The area thus outlined was incised deep to adipose tissue with a #15 scalpel blade.  The skin margins were undermined to an appropriate distance in all directions utilizing iris scissors.
Complex Repair And Bilobe Flap Text: The defect edges were debeveled with a #15 scalpel blade.  The primary defect was closed partially with a complex linear closure.  Given the location of the remaining defect, shape of the defect and the proximity to free margins a bilobe flap was deemed most appropriate for complete closure of the defect.  Using a sterile surgical marker, an appropriate advancement flap was drawn incorporating the defect and placing the expected incisions within the relaxed skin tension lines where possible.    The area thus outlined was incised deep to adipose tissue with a #15 scalpel blade.  The skin margins were undermined to an appropriate distance in all directions utilizing iris scissors.
Complex Repair And Melolabial Flap Text: The defect edges were debeveled with a #15 scalpel blade.  The primary defect was closed partially with a complex linear closure.  Given the location of the remaining defect, shape of the defect and the proximity to free margins a melolabial flap was deemed most appropriate for complete closure of the defect.  Using a sterile surgical marker, an appropriate advancement flap was drawn incorporating the defect and placing the expected incisions within the relaxed skin tension lines where possible.    The area thus outlined was incised deep to adipose tissue with a #15 scalpel blade.  The skin margins were undermined to an appropriate distance in all directions utilizing iris scissors.
Complex Repair And Rotation Flap Text: The defect edges were debeveled with a #15 scalpel blade.  The primary defect was closed partially with a complex linear closure.  Given the location of the remaining defect, shape of the defect and the proximity to free margins a rotation flap was deemed most appropriate for complete closure of the defect.  Using a sterile surgical marker, an appropriate advancement flap was drawn incorporating the defect and placing the expected incisions within the relaxed skin tension lines where possible.    The area thus outlined was incised deep to adipose tissue with a #15 scalpel blade.  The skin margins were undermined to an appropriate distance in all directions utilizing iris scissors.
Complex Repair And Rhombic Flap Text: The defect edges were debeveled with a #15 scalpel blade.  The primary defect was closed partially with a complex linear closure.  Given the location of the remaining defect, shape of the defect and the proximity to free margins a rhombic flap was deemed most appropriate for complete closure of the defect.  Using a sterile surgical marker, an appropriate advancement flap was drawn incorporating the defect and placing the expected incisions within the relaxed skin tension lines where possible.    The area thus outlined was incised deep to adipose tissue with a #15 scalpel blade.  The skin margins were undermined to an appropriate distance in all directions utilizing iris scissors.
Complex Repair And Transposition Flap Text: The defect edges were debeveled with a #15 scalpel blade.  The primary defect was closed partially with a complex linear closure.  Given the location of the remaining defect, shape of the defect and the proximity to free margins a transposition flap was deemed most appropriate for complete closure of the defect.  Using a sterile surgical marker, an appropriate advancement flap was drawn incorporating the defect and placing the expected incisions within the relaxed skin tension lines where possible.    The area thus outlined was incised deep to adipose tissue with a #15 scalpel blade.  The skin margins were undermined to an appropriate distance in all directions utilizing iris scissors.
Complex Repair And V-Y Plasty Text: The defect edges were debeveled with a #15 scalpel blade.  The primary defect was closed partially with a complex linear closure.  Given the location of the remaining defect, shape of the defect and the proximity to free margins a V-Y plasty was deemed most appropriate for complete closure of the defect.  Using a sterile surgical marker, an appropriate advancement flap was drawn incorporating the defect and placing the expected incisions within the relaxed skin tension lines where possible.    The area thus outlined was incised deep to adipose tissue with a #15 scalpel blade.  The skin margins were undermined to an appropriate distance in all directions utilizing iris scissors.
Complex Repair And M Plasty Text: The defect edges were debeveled with a #15 scalpel blade.  The primary defect was closed partially with a complex linear closure.  Given the location of the remaining defect, shape of the defect and the proximity to free margins an M plasty was deemed most appropriate for complete closure of the defect.  Using a sterile surgical marker, an appropriate advancement flap was drawn incorporating the defect and placing the expected incisions within the relaxed skin tension lines where possible.    The area thus outlined was incised deep to adipose tissue with a #15 scalpel blade.  The skin margins were undermined to an appropriate distance in all directions utilizing iris scissors.
Complex Repair And Double M Plasty Text: The defect edges were debeveled with a #15 scalpel blade.  The primary defect was closed partially with a complex linear closure.  Given the location of the remaining defect, shape of the defect and the proximity to free margins a double M plasty was deemed most appropriate for complete closure of the defect.  Using a sterile surgical marker, an appropriate advancement flap was drawn incorporating the defect and placing the expected incisions within the relaxed skin tension lines where possible.    The area thus outlined was incised deep to adipose tissue with a #15 scalpel blade.  The skin margins were undermined to an appropriate distance in all directions utilizing iris scissors.
Complex Repair And W Plasty Text: The defect edges were debeveled with a #15 scalpel blade.  The primary defect was closed partially with a complex linear closure.  Given the location of the remaining defect, shape of the defect and the proximity to free margins a W plasty was deemed most appropriate for complete closure of the defect.  Using a sterile surgical marker, an appropriate advancement flap was drawn incorporating the defect and placing the expected incisions within the relaxed skin tension lines where possible.    The area thus outlined was incised deep to adipose tissue with a #15 scalpel blade.  The skin margins were undermined to an appropriate distance in all directions utilizing iris scissors.
Complex Repair And Z Plasty Text: The defect edges were debeveled with a #15 scalpel blade.  The primary defect was closed partially with a complex linear closure.  Given the location of the remaining defect, shape of the defect and the proximity to free margins a Z plasty was deemed most appropriate for complete closure of the defect.  Using a sterile surgical marker, an appropriate advancement flap was drawn incorporating the defect and placing the expected incisions within the relaxed skin tension lines where possible.    The area thus outlined was incised deep to adipose tissue with a #15 scalpel blade.  The skin margins were undermined to an appropriate distance in all directions utilizing iris scissors.
Complex Repair And Dorsal Nasal Flap Text: The defect edges were debeveled with a #15 scalpel blade.  The primary defect was closed partially with a complex linear closure.  Given the location of the remaining defect, shape of the defect and the proximity to free margins a dorsal nasal flap was deemed most appropriate for complete closure of the defect.  Using a sterile surgical marker, an appropriate flap was drawn incorporating the defect and placing the expected incisions within the relaxed skin tension lines where possible.    The area thus outlined was incised deep to adipose tissue with a #15 scalpel blade.  The skin margins were undermined to an appropriate distance in all directions utilizing iris scissors.
Complex Repair And Ftsg Text: The defect edges were debeveled with a #15 scalpel blade.  The primary defect was closed partially with a complex linear closure.  Given the location of the defect, shape of the defect and the proximity to free margins a full thickness skin graft was deemed most appropriate to repair the remaining defect.  The graft was trimmed to fit the size of the remaining defect.  The graft was then placed in the primary defect, oriented appropriately, and sutured into place.
Complex Repair And Burow's Graft Text: The defect edges were debeveled with a #15 scalpel blade.  The primary defect was closed partially with a complex linear closure.  Given the location of the defect, shape of the defect, the proximity to free margins and the presence of a standing cone deformity a Burow's graft was deemed most appropriate to repair the remaining defect.  The graft was trimmed to fit the size of the remaining defect.  The graft was then placed in the primary defect, oriented appropriately, and sutured into place.
Complex Repair And Split-Thickness Skin Graft Text: The defect edges were debeveled with a #15 scalpel blade.  The primary defect was closed partially with a complex linear closure.  Given the location of the defect, shape of the defect and the proximity to free margins a split thickness skin graft was deemed most appropriate to repair the remaining defect.  The graft was trimmed to fit the size of the remaining defect.  The graft was then placed in the primary defect, oriented appropriately, and sutured into place.
Complex Repair And Epidermal Autograft Text: The defect edges were debeveled with a #15 scalpel blade.  The primary defect was closed partially with a complex linear closure.  Given the location of the defect, shape of the defect and the proximity to free margins an epidermal autograft was deemed most appropriate to repair the remaining defect.  The graft was trimmed to fit the size of the remaining defect.  The graft was then placed in the primary defect, oriented appropriately, and sutured into place.
Complex Repair And Dermal Autograft Text: The defect edges were debeveled with a #15 scalpel blade.  The primary defect was closed partially with a complex linear closure.  Given the location of the defect, shape of the defect and the proximity to free margins an dermal autograft was deemed most appropriate to repair the remaining defect.  The graft was trimmed to fit the size of the remaining defect.  The graft was then placed in the primary defect, oriented appropriately, and sutured into place.
Complex Repair And Tissue Cultured Epidermal Autograft Text: The defect edges were debeveled with a #15 scalpel blade.  The primary defect was closed partially with a complex linear closure.  Given the location of the defect, shape of the defect and the proximity to free margins an tissue cultured epidermal autograft was deemed most appropriate to repair the remaining defect.  The graft was trimmed to fit the size of the remaining defect.  The graft was then placed in the primary defect, oriented appropriately, and sutured into place.
Complex Repair And Xenograft Text: The defect edges were debeveled with a #15 scalpel blade.  The primary defect was closed partially with a complex linear closure.  Given the location of the defect, shape of the defect and the proximity to free margins a xenograft was deemed most appropriate to repair the remaining defect.  The graft was trimmed to fit the size of the remaining defect.  The graft was then placed in the primary defect, oriented appropriately, and sutured into place.
Complex Repair And Skin Substitute Graft Text: The defect edges were debeveled with a #15 scalpel blade.  The primary defect was closed partially with a complex linear closure.  Given the location of the remaining defect, shape of the defect and the proximity to free margins a skin substitute graft was deemed most appropriate to repair the remaining defect.  The graft was trimmed to fit the size of the remaining defect.  The graft was then placed in the primary defect, oriented appropriately, and sutured into place.
Path Notes (To The Dermatopathologist): Please check margins.
Consent was obtained from the patient. The risks and benefits to therapy were discussed in detail. Specifically, the risks of infection, scarring, bleeding, prolonged wound healing, incomplete removal, allergy to anesthesia, nerve injury and recurrence were addressed. Prior to the procedure, the treatment site was clearly identified and confirmed by the patient. All components of Universal Protocol/PAUSE Rule completed.
Post-Care Instructions: I reviewed with the patient in detail post-care instructions:\\n1. Apply bacitracin over the steri-strips.  \\n2. Cut non-stick pad (Telfa) to cover the steri-strips\\n3. Apply tape (hypafix) over the non-stick pad\\n4. Change once per day for 5 days\\n5. Shower with bandage on, change bandage after shower\\n\\nPatient is not to engage in any heavy lifting, exercise, hot tub, or swimming for the next 14 days. Should the patient develop any fevers, chills, bleeding, severe pain patient will contact the office immediately.
Home Suture Removal Text: Patient was provided a home suture removal kit and will remove their sutures at home.  If they have any questions or difficulties they will call the office.
Where Do You Want The Question To Include Opioid Counseling Located?: Case Summary Tab
Information: Selecting Yes will display possible errors in your note based on the variables you have selected. This validation is only offered as a suggestion for you. PLEASE NOTE THAT THE VALIDATION TEXT WILL BE REMOVED WHEN YOU FINALIZE YOUR NOTE. IF YOU WANT TO FAX A PRELIMINARY NOTE YOU WILL NEED TO TOGGLE THIS TO 'NO' IF YOU DO NOT WANT IT IN YOUR FAXED NOTE.

## 2020-09-21 ENCOUNTER — TELEPHONE (OUTPATIENT)
Dept: RHEUMATOLOGY | Facility: MEDICAL CENTER | Age: 60
End: 2020-09-21

## 2020-09-21 NOTE — TELEPHONE ENCOUNTER
Please notify patient that with Humira, there is about a 3 to 5% risk of cancers in general including melanoma and other skin cancers.  This is the same risk as all other Biologics as well.  If patient wants to stop Humira that is okay he can cold turkey stop the humira    Please schedule follow-up appointment to discuss alternative options for patient.

## 2020-09-21 NOTE — TELEPHONE ENCOUNTER
Stas called to give you an FYI-     He had a melanoma removed from his shoulder. It had not metastasized. But he thought you needed to know about it with the medications he is taking.        Please advise and thank you

## 2020-09-21 NOTE — TELEPHONE ENCOUNTER
Spoke with pt and relayed your message. He does NOT want to stop the Cosentyx. He will just contiune on his meds and be hyper diligent about watching for skin cancers.

## 2020-10-05 ENCOUNTER — OFFICE VISIT (OUTPATIENT)
Dept: RHEUMATOLOGY | Facility: MEDICAL CENTER | Age: 60
End: 2020-10-05
Payer: COMMERCIAL

## 2020-10-05 VITALS
BODY MASS INDEX: 30.13 KG/M2 | OXYGEN SATURATION: 96 % | WEIGHT: 216 LBS | TEMPERATURE: 97.7 F | SYSTOLIC BLOOD PRESSURE: 130 MMHG | DIASTOLIC BLOOD PRESSURE: 80 MMHG | RESPIRATION RATE: 14 BRPM | HEART RATE: 88 BPM

## 2020-10-05 DIAGNOSIS — I10 ESSENTIAL HYPERTENSION: ICD-10-CM

## 2020-10-05 DIAGNOSIS — K76.0 FATTY LIVER: ICD-10-CM

## 2020-10-05 DIAGNOSIS — R74.8 ELEVATED CPK: ICD-10-CM

## 2020-10-05 DIAGNOSIS — L40.50 PSORIATIC ARTHRITIS (HCC): ICD-10-CM

## 2020-10-05 DIAGNOSIS — Z51.81 MEDICATION MONITORING ENCOUNTER: ICD-10-CM

## 2020-10-05 PROCEDURE — 99214 OFFICE O/P EST MOD 30 MIN: CPT | Performed by: INTERNAL MEDICINE

## 2020-10-05 ASSESSMENT — FIBROSIS 4 INDEX: FIB4 SCORE: 1.86

## 2020-10-05 NOTE — PROGRESS NOTES
Chief Complaint- joint pain     Subjective:   Evens Garcia is a 60 y.o. male here today for follow up of rheumatological issues    This is a follow-up visit for this patient who we see in this clinic for psoriatic arthritis.  Patient has had a diagnosis of psoriatic arthritis since about 2013.  Patient is currently on Cosentyx at 150 mg subcu every month monotherapy.  Patient states he thinks the Cosentyx is working quite well for him, states that his psoriasis has cleared up.  Patient denies any side effects from the medication, denies any unexplained weight loss, denies any fevers of unknown etiology, denies any GI upset, denies any rashes, denies any new joint swelling, denies recurrent infections.  Of note last sedimentation rate is less than 1 July 2020.    Additional issues include the low-level elevation of CPK of unclear etiology, CPK isoenzymes pending.     Comorbidities include elevated liver functions status post liver ultrasound indicating fatty liver with all other negative serologies.      Additional comorbidity includes chronic left knee pain status post skiing accident with a meniscal injury status post rehab and patient is going to be trying to ski again this year.     S/p MTX-nausea, elevated LFTs  S/p sulfasalazine-no benefit  S/p humira-stopped secondary to development of melanoma.       HBsAg/HBcAb IgM neg 7/2020 [facility]  HCV neg 7/2020 [facility]  Quantiferon Gold neg 7/2020 [facility]   Anti-actin ab neg 7/2018-[facility]  Mitochondrial ab neg 7/2018 [facility]  CCP neg 1/2018 [facility]  RF neg 3/2013  TINA neg 3/2013  Uric acid 4.9 1/2018 [facility]  Feet x-rays 1/2017-indicates erosive arthritis on the left fifth MTP joint  Hand x-rays 1/2017-indicates DJD  SI joint x-rays 1/2017-no pathology  Liver ultrasound 7/2018-indicates fatty liver    U-1 neg 2/2020 [facility]  U-2 neg 2/2020 [facility]  Mi-2 neg 2/2020 [facility]  Ku neg2/2020 [facility]  SRP neg 2/2020 [facility]  PL-7 neg 2/2020 [facility]  PL-12 neg  2/2020 [facility]  E-J neg 2/2020 [facility]  O-J neg 2/2020 [facility]  RO-52 pos 2/2020 [facility]  PM-Scl neg 2/2020 [facility]  SELENA-1 neg 2/2020 [facility]  Anti p155/140 (TIF-1) neg 2/2020 [facility]  MDA5 (CADM 140) neg 2/2020 [facility]  SAE1 neg 2/2020 [facility]  U3 RNP neg 2/2020 [facility]  Anti CN1A ab neg 3/2020        Current medicines (including changes today)  Current Outpatient Medications   Medication Sig Dispense Refill   • Secukinumab 150 MG/ML Solution Auto-injector Inject 150 mg as instructed Q30 DAYS. 3 PEN 1   • VITAMIN D PO Take  by mouth.     • Ascorbic Acid (VITAMIN C PO) Take  by mouth.     • VITAMIN E PO Take  by mouth.     • omeprazole (PRILOSEC) 20 MG delayed-release capsule Take 1 Cap by mouth every day. 90 Cap 3   • Secukinumab (COSENTYX SENSOREADY PEN) 150 MG/ML Solution Auto-injector Inject 150 mg as instructed Q30 DAYS. 12 Each 1   • ibuprofen (MOTRIN) 200 MG TABS Take 200 mg by mouth every 6 hours as needed. Takes two prn        No current facility-administered medications for this visit.      He  has no past medical history on file.    ROS   Other than what is mentioned in HPI or physical exam, there is no history of headaches, double vision or blurred vision. No temporal tenderness or jaw claudication. No trouble swallowing difficulties or sore throats.  No chest complaints including chest pain, cough or sputum production. No GI complaints including nausea, vomiting, change in bowel habits, or past peptic ulcer disease. No history of blood in the stools. No urinary complaints including dysuria or frequency. No history of alopecia, photosensitivity, oral ulcerations, Raynaud's phenomena.       Objective:     /80   Pulse 88   Temp 36.5 °C (97.7 °F) (Temporal)   Resp 14   Wt 98 kg (216 lb)   SpO2 96%  Body mass index is 30.13 kg/m².   Physical Exam:    Constitutional: Alert and oriented X3, patient is talkative with good eye contact.Skin: Warm, dry, good turgor, no rashes in visible areas small query  pustular psoriatic rash on shin of right leg, well-healed scar on left shoulder secondary to melanoma resection.Eye: Equal, round and reactive, conjunctiva clear, lids normal EOM intactENMT: Lips without lesions, good dentition, no oropharyngeal ulcers, moist buccal mucosa, pinna without deformityNeck: Trachea midline, no masses, no thyromegaly.Lymph:  No cervical lymphadenopathy, no axillary lymphadenopathy, no supraclavicular lymphadenopathyRespiratory: Unlabored respiratory effort, lungs clear to auscultation, no wheezes, no ronchi.Cardiovascular: Normal S1, S2, no murmur, no edema.Abdomen: Soft, non-tender, no masses, no hepatosplenomegaly.Psych: Alert and oriented x3, normal affect and mood.Neuro: Cranial nerves 2-12 are grossly intact, no loss of sensation LEExt:no joint laxity noted in bilateral arms, no joint laxity noted in bilateral legs, joints look great no swan-neck but no deformities no sausage digits no dactylitis no enthesitis no flexion contractures    Lab Results   Component Value Date/Time    HEPBCORIGM Non-Reactive 07/27/2020 06:58 AM    HEPBSAG Non-Reactive 07/27/2020 06:58 AM     Lab Results   Component Value Date/Time    HEPCAB Non-Reactive 07/27/2020 06:58 AM     Lab Results   Component Value Date/Time    SODIUM 137 07/27/2020 06:58 AM    POTASSIUM 3.5 (L) 07/27/2020 06:58 AM    CHLORIDE 103 07/27/2020 06:58 AM    CO2 23 07/27/2020 06:58 AM    GLUCOSE 102 (H) 07/27/2020 06:58 AM    BUN 17 07/27/2020 06:58 AM    CREATININE 0.86 07/27/2020 06:58 AM    CREATININE 1.00 03/15/2013 12:30 PM    BUNCREATRAT 22 (H) 03/15/2013 12:30 PM    GLOMRATE >59 11/19/2010 08:43 AM      Lab Results   Component Value Date/Time    WBC 6.4 07/27/2020 06:58 AM    WBC 9.2 03/15/2013 12:30 PM    RBC 5.38 07/27/2020 06:58 AM    RBC 5.33 03/15/2013 12:30 PM    HEMOGLOBIN 16.5 07/27/2020 06:58 AM    HEMATOCRIT 48.7 07/27/2020 06:58 AM    MCV 90.5 07/27/2020 06:58 AM    MCV 83 03/15/2013 12:30 PM    MCH 30.7 07/27/2020  06:58 AM    MCH 28.3 03/15/2013 12:30 PM    MCHC 33.9 07/27/2020 06:58 AM    MPV 9.9 07/27/2020 06:58 AM    NEUTSPOLYS 60.20 07/27/2020 06:58 AM    LYMPHOCYTES 27.20 07/27/2020 06:58 AM    MONOCYTES 8.10 07/27/2020 06:58 AM    EOSINOPHILS 3.60 07/27/2020 06:58 AM    BASOPHILS 0.60 07/27/2020 06:58 AM      Lab Results   Component Value Date/Time    CALCIUM 9.1 07/27/2020 06:58 AM    ASTSGOT 40 07/27/2020 06:58 AM    ALTSGPT 49 07/27/2020 06:58 AM    ALKPHOSPHAT 88 07/27/2020 06:58 AM    TBILIRUBIN 0.6 07/27/2020 06:58 AM    ALBUMIN 4.2 07/27/2020 06:58 AM    TOTPROTEIN 6.9 07/27/2020 06:58 AM     Lab Results   Component Value Date/Time    RHEUMFACTN 8.8 03/15/2013 12:30 PM     Lab Results   Component Value Date/Time    ANADIRECT Negative 03/15/2013 12:30 PM     Lab Results   Component Value Date/Time    SEDRATEWES <1 07/27/2020 06:58 AM     Lab Results   Component Value Date/Time    CPKTOTAL 582 (H) 07/27/2020 06:58 AM     Results for orders placed during the hospital encounter of 01/11/17   DX-JOINT SURVEY-HANDS SINGLE VIEW    Impression 1.  No acute fracture or bone erosion.    2.  Mild osteoarthritis.     Results for orders placed during the hospital encounter of 01/11/17   DX-JOINT SURVEY-FEET SINGLE VIEW    Impression 1.  No acute fracture or dislocation.    2.  Some soft tissue swelling is noted around the MTP joint of the fifth digit of the left foot and subtle bone erosions in the distal metatarsal are identified. Findings could be due to gout. Inflammatory arthropathy is also possible.     Results for orders placed during the hospital encounter of 12/21/07   DX-ANKLE 3+ VIEWS    Impression IMPRESSION:    SOFT TISSUE SWELLING AND EVIDENCE OF A JOINT EFFUSION WITHOUT EVIDENCE OF   ACUTE FRACTURE.      [email or web address]        Read By SABA KAUR MD on Dec 21 2007  3:35PM  : STER[place]ING Transcription Date: Dec 22 2007  6:29PM  THIS DOCUMENT HAS BEEN ELECTRONICALLY SIGNED BY: SABA KAUR MD on   Dec 24  2007  9:07AM     Results for orders placed during the hospital encounter of 01/11/17   DX-SACROILIAC JOINTS 3+    Impression Negative exam of the sacroiliac joints.     Results for orders placed in visit on 12/17/12   MR-KNEE-W/O    Impression 1. Advanced patellofemoral compartment osteoarthritis    2. Small medial meniscal tear the junction of the posterior horn and body.    3. Truncated medial meniscal free edge suggesting degeneration or from prior meniscectomy.    4. Very small tear of the lateral meniscus at the junction of posterior and body.    5. Joint effusion       Assessment and Plan:     1. Psoriatic arthritis (HCC)  Doing well on Cosentyx 150 mg subcu every month, we will continue as such  - Comp Metabolic Panel; Future  - CBC WITH DIFFERENTIAL; Future  - Sed Rate; Future    2. Medication monitoring encounter  On Cosentyx 150 mg subcu every month  Screening labs are up-to-date, neck screening labs due about July 2022  Patient will need monitoring labs about January 2021, labs ordered for patient  We reviewed risks of biological medications with patient including hematological pathology, cancer risks, neurological and infection issues, patient states understanding.  - Comp Metabolic Panel; Future  - CBC WITH DIFFERENTIAL; Future  - Sed Rate; Future    3. Fatty liver  Confirmed by ultrasound, continue to monitor  - Comp Metabolic Panel; Future  - CBC WITH DIFFERENTIAL; Future  - Sed Rate; Future    4. Elevated CPK  Today check isoenzymes  Autoimmune myositis serologies all negative  If isoenzymes confirm muscle then schedule EMG/NCV for further evaluation    5. Essential hypertension  May impact the type of medications we can use for this patient's arthritis. We will have to keep this under advisement.  - Comp Metabolic Panel; Future  - CBC WITH DIFFERENTIAL; Future  - Sed Rate; Future    Followup: Return in about 6 months (around 4/5/2021). or sooner prkavita Garcia  was seen 30 minutes  face-to-face of which more than 50% of the time was spent counseling the patient (excluding time for procedures)  regarding  rheumatological condition and care. Therapy was discussed in detail.      Please note that this dictation was created using voice recognition software. I have made every reasonable attempt to correct obvious errors, but I expect that there are errors of grammar and possibly content that I did not discover before finalizing the note.

## 2020-11-09 ENCOUNTER — OFFICE VISIT (OUTPATIENT)
Dept: URGENT CARE | Facility: CLINIC | Age: 60
End: 2020-11-09
Payer: COMMERCIAL

## 2020-11-09 ENCOUNTER — APPOINTMENT (OUTPATIENT)
Dept: RADIOLOGY | Facility: IMAGING CENTER | Age: 60
End: 2020-11-09
Attending: PHYSICIAN ASSISTANT
Payer: COMMERCIAL

## 2020-11-09 VITALS
TEMPERATURE: 98 F | BODY MASS INDEX: 28.44 KG/M2 | WEIGHT: 210 LBS | DIASTOLIC BLOOD PRESSURE: 82 MMHG | RESPIRATION RATE: 17 BRPM | SYSTOLIC BLOOD PRESSURE: 144 MMHG | HEART RATE: 113 BPM | OXYGEN SATURATION: 92 % | HEIGHT: 72 IN

## 2020-11-09 DIAGNOSIS — R05.9 COUGH: ICD-10-CM

## 2020-11-09 DIAGNOSIS — J18.9 MULTIFOCAL PNEUMONIA: ICD-10-CM

## 2020-11-09 DIAGNOSIS — U07.1 COVID-19 VIRUS INFECTION: ICD-10-CM

## 2020-11-09 PROCEDURE — 71046 X-RAY EXAM CHEST 2 VIEWS: CPT | Mod: TC | Performed by: PHYSICIAN ASSISTANT

## 2020-11-09 PROCEDURE — 99214 OFFICE O/P EST MOD 30 MIN: CPT | Performed by: PHYSICIAN ASSISTANT

## 2020-11-09 RX ORDER — AZITHROMYCIN 250 MG/1
TABLET, FILM COATED ORAL
Qty: 1 QUANTITY SUFFICIENT | Refills: 0 | Status: SHIPPED
Start: 2020-11-09 | End: 2021-03-18

## 2020-11-09 RX ORDER — PREDNISONE 20 MG/1
20 TABLET ORAL DAILY
Qty: 5 TAB | Refills: 0 | Status: SHIPPED | OUTPATIENT
Start: 2020-11-09 | End: 2020-11-14

## 2020-11-09 RX ORDER — AMOXICILLIN AND CLAVULANATE POTASSIUM 875; 125 MG/1; MG/1
1 TABLET, FILM COATED ORAL 2 TIMES DAILY
Qty: 10 TAB | Refills: 0 | Status: SHIPPED | OUTPATIENT
Start: 2020-11-09 | End: 2020-11-14

## 2020-11-09 ASSESSMENT — ENCOUNTER SYMPTOMS
WHEEZING: 0
MYALGIAS: 0
SORE THROAT: 0
EYE REDNESS: 0
EYE DISCHARGE: 0
VOMITING: 0
DIARRHEA: 0
SHORTNESS OF BREATH: 0
FEVER: 1
HEADACHES: 1
COUGH: 1
NAUSEA: 0

## 2020-11-09 ASSESSMENT — FIBROSIS 4 INDEX: FIB4 SCORE: 1.86

## 2020-11-10 NOTE — PROGRESS NOTES
Subjective:      Evens Garcia is a 60 y.o. male who presents with Shortness of Breath (oxygen level been 90-92, cough, taking deep breath will trigger cough)        Cough  This is a new problem. Episode onset: x 10 days ago. The problem has been unchanged. The cough is non-productive. Associated symptoms include a fever (The patient reports associated intermittent fevers.  The patient states he had a fever of 102.7 last night.), headaches and nasal congestion. Pertinent negatives include no chest pain, ear pain, eye redness, myalgias, rash, sore throat, shortness of breath or wheezing. He has tried nothing for the symptoms. There is no history of asthma.     The patient states he developed COVID-like symptoms x10 days ago with an associated deep dry cough.  The patient states he tested positive for COVID-19 x1 week ago.  The patient states he is still experiencing the dry cough.  He also reports intermittent fevers.  The patient states he had a fever last night with a max temp of 102.7.  The patient reports slight nasal congestion.  He also reports intermittent headaches.  The patient reports no shortness of breath.  No wheezing with breathing.  No chest pain.  No ear pain.  No sore throat.  No muscle aches.  The patient states he has been monitoring his pulse ox at home.  The patient states his pulse ox has ranged between 90%-92% on room air.  The patient states his pulse ox has not dropped below 90%.  The patient states his heart rate has remained elevated at approximately 110.  The patient has not taken any medications for his current symptoms.    PMH:  has no past medical history on file.  MEDS:   Current Outpatient Medications:   •  Secukinumab (COSENTYX SENSOREADY PEN) 150 MG/ML Solution Auto-injector, Inject 150 mg as instructed Q30 DAYS., Disp: 12 Each, Rfl: 1  •  Secukinumab 150 MG/ML Solution Auto-injector, Inject 150 mg as instructed Q30 DAYS. (Patient not taking: Reported on 11/9/2020), Disp:  3 PEN, Rfl: 1  •  VITAMIN D PO, Take  by mouth., Disp: , Rfl:   •  Ascorbic Acid (VITAMIN C PO), Take  by mouth., Disp: , Rfl:   •  VITAMIN E PO, Take  by mouth., Disp: , Rfl:   •  omeprazole (PRILOSEC) 20 MG delayed-release capsule, Take 1 Cap by mouth every day., Disp: 90 Cap, Rfl: 3  •  ibuprofen (MOTRIN) 200 MG TABS, Take 200 mg by mouth every 6 hours as needed. Takes two prn , Disp: , Rfl:   ALLERGIES:   Allergies   Allergen Reactions   • Nkda [No Known Drug Allergy]      SURGHX:   Past Surgical History:   Procedure Laterality Date   • KNEE ARTHROSCOPY  1/27/2011    Performed by DANY STUART at [facility]   • MENISCECTOMY  1/27/2011    Performed by DANY STUART at [facility]   • MEDIAL MENISCECTOMY  1/27/2011    Performed by DANY STUART at [facility]   • OTHER ORTHOPEDIC SURGERY  1996    R knee patellar fracture   • VASECTOMY  1992   • TONSILLECTOMY AND ADENOIDECTOMY  1965     SOCHX:  reports that he has never smoked. He has never used smokeless tobacco. He reports current alcohol use of about 1.0 oz of alcohol per week. He reports that he does not use drugs.  FH: Family history was reviewed, no pertinent findings to report    Review of Systems   Constitutional: Positive for fever (The patient reports associated intermittent fevers.  The patient states he had a fever of 102.7 last night.).   HENT: Positive for congestion. Negative for ear pain and sore throat.    Eyes: Negative for discharge and redness.   Respiratory: Positive for cough. Negative for shortness of breath and wheezing.    Cardiovascular: Negative for chest pain and leg swelling.   Gastrointestinal: Negative for diarrhea, nausea and vomiting.   Musculoskeletal: Negative for myalgias.   Skin: Negative for rash.   Neurological: Positive for headaches.   All other systems reviewed and are negative.         Objective:     /82 (BP Location: Left arm, Patient Position: Sitting, BP Cuff Size:  Adult)   Pulse (!) 113   Temp 36.7 °C (98 °F) (Temporal)   Resp 17   Ht 1.829 m (6')   Wt 95.3 kg (210 lb)   SpO2 92%   BMI 28.48 kg/m²      Physical Exam  Constitutional:       General: He is not in acute distress.     Appearance: Normal appearance. He is not ill-appearing.   HENT:      Head: Normocephalic and atraumatic.      Right Ear: External ear normal.      Left Ear: External ear normal.      Nose: Nose normal.      Mouth/Throat:      Mouth: Mucous membranes are moist.      Pharynx: Oropharynx is clear. Uvula midline. No posterior oropharyngeal erythema.      Tonsils: No tonsillar exudate.   Eyes:      Extraocular Movements: Extraocular movements intact.      Conjunctiva/sclera: Conjunctivae normal.   Neck:      Musculoskeletal: Normal range of motion and neck supple.   Cardiovascular:      Rate and Rhythm: Normal rate and regular rhythm.      Heart sounds: Normal heart sounds.   Pulmonary:      Effort: Pulmonary effort is normal. No respiratory distress.      Breath sounds: Rales (left lung base) present. No wheezing or rhonchi.   Musculoskeletal: Normal range of motion.   Skin:     General: Skin is warm and dry.   Neurological:      Mental Status: He is alert and oriented to person, place, and time.            Progress:  CXR:  FINDINGS:  Cardiomediastinal contour is within normal limits.  Patchy ill-defined opacities in the LEFT midlung and RIGHT lung base.  Mildly prominent interstitium.  Linear opacities in the RIGHT midlung.  No pleural fluid collection or pneumothorax.  No major bony abnormality is seen.     IMPRESSION:  1.  Multiple patchy pulmonary infiltrates particularly on the LEFT side suggesting multifocal pneumonia.  Findings compatible with Covid 19 pneumonia.  2.  Minimal RIGHT midlung atelectasis.    Recheck:  POX: 94-95% on room air  HR: 107     Assessment/Plan:        1. Cough  - DX-CHEST-2 VIEWS; Future    2. COVID-19 virus infection    3. Multifocal pneumonia  -  amoxicillin-clavulanate (AUGMENTIN) 875-125 MG Tab; Take 1 Tab by mouth 2 times a day for 5 days.  Dispense: 10 Tab; Refill: 0  - azithromycin (ZITHROMAX) 250 MG Tab; Take 2 tablets (500 mg) PO on day 1 and then take 1 tablet (250 mg) daily on 2-5  Dispense: 1 Quantity Sufficient; Refill: 0  - predniSONE (DELTASONE) 20 MG Tab; Take 1 Tab by mouth every day for 5 days.  Dispense: 5 Tab; Refill: 0    The patient's presenting symptoms and physical exam findings are consistent with a persistent dry cough.  The patient states he was recently diagnosed with COVID-19.  On physical exam, the patient had mild rales to the left lung base without wheezing or rhonchi.  The patient's pulse ox was within normal limits.  Additionally, the patient's heart rate was mildly tachycardic.  The remainder the patient's physical exam today in clinic was normal.  The patient appears in no acute distress.  The patient's vital signs are stable and within normal limits, with the exception of his mildly elevated heart rate as previously mentioned.  He is afebrile today in clinic.  A chest x-ray is obtained to further evaluate the patient's current symptoms.  The patient's chest x-ray showed multiple patchy pulmonary infiltrates particularly on the left side suggesting multifocal pneumonia.  The patient's x-ray findings are compatible with COVID-19 pneumonia.  The patient's chest x-ray also showed minimal right midlung atelectasis.  Will prescribe the patient Augmentin and Azithromycin for his acute pneumonia.  Will also prescribe the patient Prednisone for his current symptoms.  Advised the patient to continue to monitor his pulse ox and heart rate at home.  Instructed the patient to go to the ED if his pulse ox drops below 90% on room air.  Recommend OTC medications and supportive care for symptomatic management.  Recommend the patient follow-up with his PCP as needed.  Discussed STRICT ED precautions with the patient, and he verbalized  understanding.    Differential diagnoses, supportive care, and indications for immediate follow-up discussed with patient.   Instructed to return to clinic or nearest emergency department for any change in condition, further concerns, or worsening of symptoms.    OTC Tylenol or Motrin for fever/discomfort.  OTC cough/cold medication for symptomatic relief  OTC Supportive Care for Congestion - saline nasal spray or neti pot  Drink plenty of fluids  Advised the patient to stay at home under self-isolation until symptoms have been present for at least 10 days and are improved, and there has been no fever for at least 72 hours without the use of medications and/or no vomiting or diarrhea for 48 hours.  --Provided the patient with home isolation and self quarantine instructions  Work note provided  Follow-up with PCP  Return to clinic or go to the ED if symptoms worsen or fail to improve, or if the patient should develop worsening/increasing cough, congestion, ear pain, sore throat, shortness of breath, wheezing, chest pain, fever/chills, and/or any concerning symptoms.    Discussed plan with the patient, and he agrees to the above.    Please note that this dictation was created using voice recognition software. I have made every reasonable attempt to correct obvious errors, but I expect that there may be errors of grammar and possibly content that I did not discover before finalizing the note.

## 2020-11-25 ENCOUNTER — APPOINTMENT (RX ONLY)
Dept: URBAN - METROPOLITAN AREA CLINIC 20 | Facility: CLINIC | Age: 60
Setting detail: DERMATOLOGY
End: 2020-11-25

## 2020-11-25 DIAGNOSIS — L82.1 OTHER SEBORRHEIC KERATOSIS: ICD-10-CM

## 2020-11-25 DIAGNOSIS — D18.0 HEMANGIOMA: ICD-10-CM

## 2020-11-25 DIAGNOSIS — L81.4 OTHER MELANIN HYPERPIGMENTATION: ICD-10-CM

## 2020-11-25 DIAGNOSIS — Z85.820 PERSONAL HISTORY OF MALIGNANT MELANOMA OF SKIN: ICD-10-CM

## 2020-11-25 DIAGNOSIS — Z71.89 OTHER SPECIFIED COUNSELING: ICD-10-CM

## 2020-11-25 DIAGNOSIS — L57.0 ACTINIC KERATOSIS: ICD-10-CM

## 2020-11-25 DIAGNOSIS — D22 MELANOCYTIC NEVI: ICD-10-CM

## 2020-11-25 DIAGNOSIS — L40.0 PSORIASIS VULGARIS: ICD-10-CM | Status: WELL CONTROLLED

## 2020-11-25 PROBLEM — D22.71 MELANOCYTIC NEVI OF RIGHT LOWER LIMB, INCLUDING HIP: Status: ACTIVE | Noted: 2020-11-25

## 2020-11-25 PROBLEM — D22.62 MELANOCYTIC NEVI OF LEFT UPPER LIMB, INCLUDING SHOULDER: Status: ACTIVE | Noted: 2020-11-25

## 2020-11-25 PROBLEM — D18.01 HEMANGIOMA OF SKIN AND SUBCUTANEOUS TISSUE: Status: ACTIVE | Noted: 2020-11-25

## 2020-11-25 PROBLEM — D48.5 NEOPLASM OF UNCERTAIN BEHAVIOR OF SKIN: Status: ACTIVE | Noted: 2020-11-25

## 2020-11-25 PROBLEM — D22.72 MELANOCYTIC NEVI OF LEFT LOWER LIMB, INCLUDING HIP: Status: ACTIVE | Noted: 2020-11-25

## 2020-11-25 PROBLEM — D22.61 MELANOCYTIC NEVI OF RIGHT UPPER LIMB, INCLUDING SHOULDER: Status: ACTIVE | Noted: 2020-11-25

## 2020-11-25 PROBLEM — D22.5 MELANOCYTIC NEVI OF TRUNK: Status: ACTIVE | Noted: 2020-11-25

## 2020-11-25 PROCEDURE — 17000 DESTRUCT PREMALG LESION: CPT | Mod: 59

## 2020-11-25 PROCEDURE — 11103 TANGNTL BX SKIN EA SEP/ADDL: CPT

## 2020-11-25 PROCEDURE — 11102 TANGNTL BX SKIN SINGLE LES: CPT

## 2020-11-25 PROCEDURE — ? COUNSELING

## 2020-11-25 PROCEDURE — ? BIOPSY BY SHAVE METHOD

## 2020-11-25 PROCEDURE — 99214 OFFICE O/P EST MOD 30 MIN: CPT | Mod: 25

## 2020-11-25 PROCEDURE — ? ADDITIONAL NOTES

## 2020-11-25 PROCEDURE — ? LIQUID NITROGEN

## 2020-11-25 PROCEDURE — 17003 DESTRUCT PREMALG LES 2-14: CPT

## 2020-11-25 ASSESSMENT — LOCATION DETAILED DESCRIPTION DERM
LOCATION DETAILED: RIGHT ANTERIOR DISTAL UPPER ARM
LOCATION DETAILED: RIGHT KNEE
LOCATION DETAILED: LEFT PROXIMAL PRETIBIAL REGION
LOCATION DETAILED: RIGHT DISTAL POSTERIOR UPPER ARM
LOCATION DETAILED: RIGHT SUPERIOR MEDIAL MIDBACK
LOCATION DETAILED: INFERIOR THORACIC SPINE
LOCATION DETAILED: PERIUMBILICAL SKIN
LOCATION DETAILED: RIGHT ANTERIOR PROXIMAL UPPER ARM
LOCATION DETAILED: RIGHT DISTAL POSTERIOR THIGH
LOCATION DETAILED: LEFT CENTRAL TEMPLE
LOCATION DETAILED: LEFT VENTRAL DISTAL FOREARM
LOCATION DETAILED: LEFT ANTERIOR DISTAL THIGH
LOCATION DETAILED: LEFT DISTAL POSTERIOR THIGH
LOCATION DETAILED: RIGHT PROXIMAL PRETIBIAL REGION
LOCATION DETAILED: RIGHT ANTERIOR DISTAL THIGH
LOCATION DETAILED: RIGHT VENTRAL PROXIMAL FOREARM
LOCATION DETAILED: LEFT ANTERIOR SHOULDER
LOCATION DETAILED: LEFT ANTERIOR DISTAL UPPER ARM
LOCATION DETAILED: NASAL TIP
LOCATION DETAILED: LEFT DISTAL POSTERIOR UPPER ARM
LOCATION DETAILED: LEFT LATERAL FOREHEAD
LOCATION DETAILED: LEFT KNEE
LOCATION DETAILED: LEFT ANTERIOR PROXIMAL UPPER ARM

## 2020-11-25 ASSESSMENT — LOCATION SIMPLE DESCRIPTION DERM
LOCATION SIMPLE: LEFT SHOULDER
LOCATION SIMPLE: LEFT UPPER ARM
LOCATION SIMPLE: LEFT FOREARM
LOCATION SIMPLE: RIGHT PRETIBIAL REGION
LOCATION SIMPLE: LEFT FOREHEAD
LOCATION SIMPLE: LEFT THIGH
LOCATION SIMPLE: RIGHT LOWER BACK
LOCATION SIMPLE: NOSE
LOCATION SIMPLE: RIGHT FOREARM
LOCATION SIMPLE: RIGHT KNEE
LOCATION SIMPLE: LEFT KNEE
LOCATION SIMPLE: RIGHT UPPER ARM
LOCATION SIMPLE: UPPER BACK
LOCATION SIMPLE: RIGHT THIGH
LOCATION SIMPLE: ABDOMEN
LOCATION SIMPLE: LEFT PRETIBIAL REGION
LOCATION SIMPLE: LEFT POSTERIOR THIGH
LOCATION SIMPLE: LEFT TEMPLE
LOCATION SIMPLE: RIGHT POSTERIOR THIGH

## 2020-11-25 ASSESSMENT — LOCATION ZONE DERM
LOCATION ZONE: ARM
LOCATION ZONE: LEG
LOCATION ZONE: FACE
LOCATION ZONE: NOSE
LOCATION ZONE: TRUNK

## 2020-11-25 NOTE — HPI: EVALUATION OF SKIN LESION(S)
How Severe Are Your Spot(S)?: mild
Have Your Spot(S) Been Treated In The Past?: has not been treated
Hpi Title: Evaluation of Skin Lesions
Location: Left shoulder
Year Removed: 2020

## 2020-11-25 NOTE — PROCEDURE: BIOPSY BY SHAVE METHOD
Detail Level: Detailed
Depth Of Biopsy: dermis
Was A Bandage Applied: Yes
Size Of Lesion In Cm: 0.9
X Size Of Lesion In Cm: 0.6
Biopsy Type: H and E
Biopsy Method: Dermablade
Anesthesia Type: 1% lidocaine with epinephrine
Anesthesia Volume In Cc: 0.5
Additional Anesthesia Volume In Cc (Will Not Render If 0): 0
Hemostasis: Drysol
Wound Care: Petrolatum
Dressing: bandage
Destruction After The Procedure: No
Type Of Destruction Used: Curettage
Curettage Text: The wound bed was treated with curettage after the biopsy was performed.
Cryotherapy Text: The wound bed was treated with cryotherapy after the biopsy was performed.
Electrodesiccation Text: The wound bed was treated with electrodesiccation after the biopsy was performed.
Electrodesiccation And Curettage Text: The wound bed was treated with electrodesiccation and curettage after the biopsy was performed.
Silver Nitrate Text: The wound bed was treated with silver nitrate after the biopsy was performed.
Lab: [id number]
Lab Facility: 
Consent: Written consent was obtained and risks were reviewed including but not limited to scarring, infection, bleeding, scabbing, incomplete removal, nerve damage and allergy to anesthesia.
Post-Care Instructions: I reviewed with the patient in detail post-care instructions. Patient is to keep the biopsy site dry overnight, and then apply bacitracin twice daily until healed. Patient may apply hydrogen peroxide soaks to remove any crusting.
Notification Instructions: Patient will be notified of biopsy results. However, patient instructed to call the office if not contacted within 2 weeks.
Billing Type: Third-Party Bill
Information: Selecting Yes will display possible errors in your note based on the variables you have selected. This validation is only offered as a suggestion for you. PLEASE NOTE THAT THE VALIDATION TEXT WILL BE REMOVED WHEN YOU FINALIZE YOUR NOTE. IF YOU WANT TO FAX A PRELIMINARY NOTE YOU WILL NEED TO TOGGLE THIS TO 'NO' IF YOU DO NOT WANT IT IN YOUR FAXED NOTE.
Size Of Lesion In Cm: 0.7
Size Of Lesion In Cm: 0.8

## 2020-11-25 NOTE — PROCEDURE: MIPS QUALITY
Quality 226: Preventive Care And Screening: Tobacco Use: Screening And Cessation Intervention: Patient screened for tobacco use and is an ex/non-smoker
Quality 137: Melanoma: Continuity Of Care - Recall System: Patient information entered into a recall system that includes: target date for the next exam specified AND a process to follow up with patients regarding missed or unscheduled appointments
Detail Level: Detailed
Quality 402: Tobacco Use And Help With Quitting Among Adolescents: Patient screened for tobacco and never smoked
Quality 130: Documentation Of Current Medications In The Medical Record: Current Medications Documented

## 2020-11-25 NOTE — PROCEDURE: COUNSELING
Detail Level: Detailed
Quality 137: Melanoma: Continuity Of Care - Recall System: Patient information entered into a recall system that includes: target date for the next exam specified AND a process to follow up with patients regarding missed or unscheduled appointments
Detail Level: Zone
Show Follow-Up Variable: Yes

## 2020-11-25 NOTE — PROCEDURE: ADDITIONAL NOTES
Additional Notes: Patient with history of psoriasis and psoriatic arthritis\\nHe has switched to Cosentyx from Humira since his last visit. Managed by his rheumatologist. His rheumatologist is aware of his melanoma. \\nPatient had COVID several weeks ago, held his medication, recovered well, and restarted. \\nSkin disease completely clear on Cosentyx, increased control than Humira.
Detail Level: Simple

## 2021-01-07 ENCOUNTER — OFFICE VISIT (OUTPATIENT)
Dept: RHEUMATOLOGY | Facility: MEDICAL CENTER | Age: 61
End: 2021-01-07
Payer: COMMERCIAL

## 2021-01-07 VITALS
HEART RATE: 92 BPM | OXYGEN SATURATION: 95 % | TEMPERATURE: 97.6 F | RESPIRATION RATE: 14 BRPM | BODY MASS INDEX: 29.57 KG/M2 | WEIGHT: 218 LBS | DIASTOLIC BLOOD PRESSURE: 60 MMHG | SYSTOLIC BLOOD PRESSURE: 128 MMHG

## 2021-01-07 DIAGNOSIS — K76.0 FATTY LIVER: ICD-10-CM

## 2021-01-07 DIAGNOSIS — I10 ESSENTIAL HYPERTENSION: ICD-10-CM

## 2021-01-07 DIAGNOSIS — Z51.81 MEDICATION MONITORING ENCOUNTER: ICD-10-CM

## 2021-01-07 DIAGNOSIS — L40.50 PSORIATIC ARTHRITIS (HCC): ICD-10-CM

## 2021-01-07 DIAGNOSIS — R74.8 ELEVATED CPK: ICD-10-CM

## 2021-01-07 PROCEDURE — 99214 OFFICE O/P EST MOD 30 MIN: CPT | Performed by: INTERNAL MEDICINE

## 2021-01-07 RX ORDER — SECUKINUMAB 150 MG/ML
150 INJECTION SUBCUTANEOUS
Qty: 3 EACH | Refills: 1 | Status: SHIPPED | OUTPATIENT
Start: 2021-01-07 | End: 2021-07-08

## 2021-01-07 ASSESSMENT — FIBROSIS 4 INDEX: FIB4 SCORE: 1.86

## 2021-01-07 NOTE — PROGRESS NOTES
Chief Complaint- joint pain     Subjective:   Evens Garcia is a 60 y.o. male here today for follow up of rheumatological issues    This is a follow-up visit for this patient who we see in this clinic for psoriatic arthritis.  Patient carries a diagnosis of psoriatic arthritis since about 2013.  Patient is currently on Cosentyx at 150 mg subcu monthly with great benefit.  Patient states his joints are doing well and his psoriasis is under good control.  Patient denies any side effects from the medication, denies any unexplained weight loss, denies any fevers of unknown etiology, denies any GI upset, denies any rashes, denies any new joint swelling, denies recurrent infections.  Of note labs are pending January 13, 2021      Additional issues include the low-level elevation of CPK of unclear etiology, CPK isoenzymes to be drawn January 13, 2021, patient has an appointment for lab draw     Comorbidities include elevated liver functions status post liver ultrasound indicating fatty liver with all other negative serologies.      Additional comorbidity includes chronic left knee pain status post skiing accident with a meniscal injury status post rehab and patient is going to be trying to ski again this year.  Patient also history of melanoma resection.     S/p MTX-nausea, elevated LFTs  S/p sulfasalazine-no benefit  S/p humira-stopped secondary to development of melanoma.        HBsAg/HBcAb IgM neg 7/2020 [facility]  HCV neg 7/2020 [facility]  Quantiferon Gold neg 7/2020 [facility]   Anti-actin ab neg 7/2018-[facility]  Mitochondrial ab neg 7/2018 [facility]  CCP neg 1/2018 [facility]  RF neg 3/2013  TINA neg 3/2013  Uric acid 4.9 1/2018 [facility]  Feet x-rays 1/2017-indicates erosive arthritis on the left fifth MTP joint  Hand x-rays 1/2017-indicates DJD  SI joint x-rays 1/2017-no pathology  Liver ultrasound 7/2018-indicates fatty liver     U-1 neg 2/2020 [facility]  U-2 neg 2/2020 [facility]  Mi-2 neg 2/2020 [facility]  Ku neg2/2020  [facility]  SRP neg 2/2020 [facility]  PL-7 neg 2/2020 [facility]  PL-12 neg 2/2020 [facility]  E-J neg 2/2020 [facility]  O-J neg 2/2020 [facility]  RO-52 pos 2/2020 [facility]  PM-Scl neg 2/2020 [facility]  SELENA-1 neg 2/2020 [facility]  Anti p155/140 (TIF-1) neg 2/2020 [facility]  MDA5 (CADM 140) neg 2/2020 [facility]  SAE1 neg 2/2020 [facility]  U3 RNP neg 2/2020 [facility]  Anti CN1A ab neg 3/2020         Current medicines (including changes today)  Current Outpatient Medications   Medication Sig Dispense Refill   • Secukinumab (COSENTYX SENSOREADY PEN) 150 MG/ML Solution Auto-injector Inject 150 mg under the skin Q30 DAYS. 3 Each 1   • VITAMIN D PO Take  by mouth.     • Ascorbic Acid (VITAMIN C PO) Take  by mouth.     • VITAMIN E PO Take  by mouth.     • omeprazole (PRILOSEC) 20 MG delayed-release capsule Take 1 Cap by mouth every day. 90 Cap 3   • ibuprofen (MOTRIN) 200 MG TABS Take 200 mg by mouth every 6 hours as needed. Takes two prn      • azithromycin (ZITHROMAX) 250 MG Tab Take 2 tablets (500 mg) PO on day 1 and then take 1 tablet (250 mg) daily on 2-5 (Patient not taking: Reported on 1/7/2021) 1 Quantity Sufficient 0     No current facility-administered medications for this visit.      He  has no past medical history on file.    ROS   Other than what is mentioned in HPI or physical exam, there is no history of headaches, double vision or blurred vision. No temporal tenderness or jaw claudication. No trouble swallowing difficulties or sore throats.  No chest complaints including chest pain, cough or sputum production. No GI complaints including nausea, vomiting, change in bowel habits, or past peptic ulcer disease. No history of blood in the stools. No urinary complaints including dysuria or frequency. No history of alopecia, photosensitivity, oral ulcerations, Raynaud's phenomena.       Objective:     /60   Pulse 92   Temp 36.4 °C (97.6 °F) (Temporal)   Resp 14   Wt 98.9 kg (218 lb)   SpO2 95%  Body mass index is 29.57 kg/m².    Physical Exam:    Constitutional: Alert and oriented X3, patient is talkative with good eye contact.Skin: Warm, dry, good turgor, no rashes in visible areas, dry skin but no marsha psoriatic plaques.Eye: Equal, round and reactive, conjunctiva clear, lids normal EOM intactENMT: Lips without lesions, good dentition, no oropharyngeal ulcers, moist buccal mucosa, pinna without deformityNeck: Trachea midline, no masses, no thyromegaly.Lymph:  No cervical lymphadenopathy, no axillary lymphadenopathy, no supraclavicular lymphadenopathyRespiratory: Unlabored respiratory effort, lungs clear to auscultation, no wheezes, no ronchi.Cardiovascular: Normal S1, S2, no murmur, no edema.Abdomen: Soft, non-tender, no masses, no hepatosplenomegaly.Psych: Alert and oriented x3, normal affect and mood.Neuro: Cranial nerves 2-12 are grossly intact, no loss of sensation LEExt:no joint laxity noted in bilateral arms, no joint laxity noted in bilateral legs, joints look great no swan-neck or boutonniere deformities no sausage digits no dactylitis, toes without crossover toes without splay toes, shoulders full range of motion without limitations, elbows without any flexion contractures    Lab Results   Component Value Date/Time    HEPBCORIGM Non-Reactive 07/27/2020 06:58 AM    HEPBSAG Non-Reactive 07/27/2020 06:58 AM     Lab Results   Component Value Date/Time    HEPCAB Non-Reactive 07/27/2020 06:58 AM     Lab Results   Component Value Date/Time    SODIUM 137 07/27/2020 06:58 AM    POTASSIUM 3.5 (L) 07/27/2020 06:58 AM    CHLORIDE 103 07/27/2020 06:58 AM    CO2 23 07/27/2020 06:58 AM    GLUCOSE 102 (H) 07/27/2020 06:58 AM    BUN 17 07/27/2020 06:58 AM    CREATININE 0.86 07/27/2020 06:58 AM    CREATININE 1.00 03/15/2013 12:30 PM    BUNCREATRAT 22 (H) 03/15/2013 12:30 PM    GLOMRATE >59 11/19/2010 08:43 AM      Lab Results   Component Value Date/Time    WBC 6.4 07/27/2020 06:58 AM    WBC 9.2 03/15/2013 12:30 PM    RBC 5.38 07/27/2020 06:58 AM     RBC 5.33 03/15/2013 12:30 PM    HEMOGLOBIN 16.5 07/27/2020 06:58 AM    HEMATOCRIT 48.7 07/27/2020 06:58 AM    MCV 90.5 07/27/2020 06:58 AM    MCV 83 03/15/2013 12:30 PM    MCH 30.7 07/27/2020 06:58 AM    MCH 28.3 03/15/2013 12:30 PM    MCHC 33.9 07/27/2020 06:58 AM    MPV 9.9 07/27/2020 06:58 AM    NEUTSPOLYS 60.20 07/27/2020 06:58 AM    LYMPHOCYTES 27.20 07/27/2020 06:58 AM    MONOCYTES 8.10 07/27/2020 06:58 AM    EOSINOPHILS 3.60 07/27/2020 06:58 AM    BASOPHILS 0.60 07/27/2020 06:58 AM      Lab Results   Component Value Date/Time    CALCIUM 9.1 07/27/2020 06:58 AM    ASTSGOT 40 07/27/2020 06:58 AM    ALTSGPT 49 07/27/2020 06:58 AM    ALKPHOSPHAT 88 07/27/2020 06:58 AM    TBILIRUBIN 0.6 07/27/2020 06:58 AM    ALBUMIN 4.2 07/27/2020 06:58 AM    TOTPROTEIN 6.9 07/27/2020 06:58 AM     Lab Results   Component Value Date/Time    RHEUMFACTN 8.8 03/15/2013 12:30 PM     Lab Results   Component Value Date/Time    ANADIRECT Negative 03/15/2013 12:30 PM     Lab Results   Component Value Date/Time    SEDRATEWES <1 07/27/2020 06:58 AM     Lab Results   Component Value Date/Time    CPKTOTAL 582 (H) 07/27/2020 06:58 AM     Results for orders placed during the hospital encounter of 01/11/17   DX-JOINT SURVEY-HANDS SINGLE VIEW    Impression 1.  No acute fracture or bone erosion.    2.  Mild osteoarthritis.     Results for orders placed during the hospital encounter of 01/11/17   DX-JOINT SURVEY-FEET SINGLE VIEW    Impression 1.  No acute fracture or dislocation.    2.  Some soft tissue swelling is noted around the MTP joint of the fifth digit of the left foot and subtle bone erosions in the distal metatarsal are identified. Findings could be due to gout. Inflammatory arthropathy is also possible.     Results for orders placed during the hospital encounter of 12/21/07   DX-ANKLE 3+ VIEWS    Impression IMPRESSION:    SOFT TISSUE SWELLING AND EVIDENCE OF A JOINT EFFUSION WITHOUT EVIDENCE OF   ACUTE FRACTURE.      Julian         Read By SABA KAUR MD on Dec 21 2007  3:35PM  : [place] Transcription Date: Dec 22 2007  6:29PM  THIS DOCUMENT HAS BEEN ELECTRONICALLY SIGNED BY: SABA KAUR MD on   Dec 24 2007  9:07AM     Results for orders placed during the hospital encounter of 01/11/17   DX-SACROILIAC JOINTS 3+    Impression Negative exam of the sacroiliac joints.     Results for orders placed in visit on 12/17/12   MR-KNEE-W/O    Impression 1. Advanced patellofemoral compartment osteoarthritis    2. Small medial meniscal tear the junction of the posterior horn and body.    3. Truncated medial meniscal free edge suggesting degeneration or from prior meniscectomy.    4. Very small tear of the lateral meniscus at the junction of posterior and body.    5. Joint effusion     Assessment and Plan:     1. Psoriatic arthritis (HCC)  Doing quite well on Cosentyx at 150 mg subcu every month, we will continue as such  - Secukinumab (COSENTYX SENSOREADY PEN) 150 MG/ML Solution Auto-injector; Inject 150 mg under the skin Q30 DAYS.  Dispense: 3 Each; Refill: 1  - HEP B CORE AB IGM; Future  - HEP B SURFACE ANTIGEN; Future  - HEP C VIRUS ANTIBODY; Future  - Quantiferon Gold Plus TB (4 tube); Future    2. Medication monitoring encounter  On Cosentyx 150 mg subcu every month, screening labs are up-to-date due July 2022, will go ahead and order those today  Patient has monitoring labs pending patient has an appointment scheduled for January 13, 2021 to get labs done  We reviewed risks of biological medications with patient including hematological pathology, cancer risks, neurological and infection issues especially in the Covid-19 pandemic environment, patient states understanding.  - HEP B CORE AB IGM; Future  - HEP B SURFACE ANTIGEN; Future  - HEP C VIRUS ANTIBODY; Future  - Quantiferon Gold Plus TB (4 tube); Future    3. Fatty liver  Confirmed by ultrasound, continue to monitor  May impact the type of medications we can use for this  patient's arthritis. We will have to keep this under advisement.    4. Elevated CPK  Patient to get isoenzymes checked at lab appointment January 13, 2021, to assure muscle versus liver or other etiology    5. Essential hypertension  May impact the type of medications we can use for this patient's arthritis. We will have to keep this under advisement.    Followup: Return in about 6 months (around 7/7/2021). or sooner abdoul Garcia  was seen 30 minutes face-to-face of which more than 50% of the time was spent counseling the patient (excluding time for procedures)  regarding  rheumatological condition and care. Therapy was discussed in detail.      Please note that this dictation was created using voice recognition software. I have made every reasonable attempt to correct obvious errors, but I expect that there are errors of grammar and possibly content that I did not discover before finalizing the note.

## 2021-02-16 ENCOUNTER — APPOINTMENT (RX ONLY)
Dept: URBAN - METROPOLITAN AREA CLINIC 20 | Facility: CLINIC | Age: 61
Setting detail: DERMATOLOGY
End: 2021-02-16

## 2021-02-16 DIAGNOSIS — Z71.89 OTHER SPECIFIED COUNSELING: ICD-10-CM

## 2021-02-16 DIAGNOSIS — D22 MELANOCYTIC NEVI: ICD-10-CM

## 2021-02-16 DIAGNOSIS — L57.0 ACTINIC KERATOSIS: ICD-10-CM

## 2021-02-16 DIAGNOSIS — Z85.820 PERSONAL HISTORY OF MALIGNANT MELANOMA OF SKIN: ICD-10-CM

## 2021-02-16 DIAGNOSIS — L73.8 OTHER SPECIFIED FOLLICULAR DISORDERS: ICD-10-CM

## 2021-02-16 DIAGNOSIS — L82.1 OTHER SEBORRHEIC KERATOSIS: ICD-10-CM

## 2021-02-16 DIAGNOSIS — L81.4 OTHER MELANIN HYPERPIGMENTATION: ICD-10-CM

## 2021-02-16 DIAGNOSIS — D18.0 HEMANGIOMA: ICD-10-CM

## 2021-02-16 PROBLEM — D22.61 MELANOCYTIC NEVI OF RIGHT UPPER LIMB, INCLUDING SHOULDER: Status: ACTIVE | Noted: 2021-02-16

## 2021-02-16 PROBLEM — D22.5 MELANOCYTIC NEVI OF TRUNK: Status: ACTIVE | Noted: 2021-02-16

## 2021-02-16 PROBLEM — D22.62 MELANOCYTIC NEVI OF LEFT UPPER LIMB, INCLUDING SHOULDER: Status: ACTIVE | Noted: 2021-02-16

## 2021-02-16 PROBLEM — D22.72 MELANOCYTIC NEVI OF LEFT LOWER LIMB, INCLUDING HIP: Status: ACTIVE | Noted: 2021-02-16

## 2021-02-16 PROBLEM — D22.71 MELANOCYTIC NEVI OF RIGHT LOWER LIMB, INCLUDING HIP: Status: ACTIVE | Noted: 2021-02-16

## 2021-02-16 PROBLEM — D48.5 NEOPLASM OF UNCERTAIN BEHAVIOR OF SKIN: Status: ACTIVE | Noted: 2021-02-16

## 2021-02-16 PROBLEM — D18.01 HEMANGIOMA OF SKIN AND SUBCUTANEOUS TISSUE: Status: ACTIVE | Noted: 2021-02-16

## 2021-02-16 PROCEDURE — 17003 DESTRUCT PREMALG LES 2-14: CPT

## 2021-02-16 PROCEDURE — ? ADDITIONAL NOTES

## 2021-02-16 PROCEDURE — ? BIOPSY BY SHAVE METHOD

## 2021-02-16 PROCEDURE — 99213 OFFICE O/P EST LOW 20 MIN: CPT | Mod: 25

## 2021-02-16 PROCEDURE — 11102 TANGNTL BX SKIN SINGLE LES: CPT

## 2021-02-16 PROCEDURE — ? COUNSELING

## 2021-02-16 PROCEDURE — 17000 DESTRUCT PREMALG LESION: CPT | Mod: 59

## 2021-02-16 PROCEDURE — ? LIQUID NITROGEN

## 2021-02-16 ASSESSMENT — LOCATION DETAILED DESCRIPTION DERM
LOCATION DETAILED: INFERIOR THORACIC SPINE
LOCATION DETAILED: RIGHT ANTERIOR DISTAL THIGH
LOCATION DETAILED: NASAL TIP
LOCATION DETAILED: LEFT ANTERIOR DISTAL THIGH
LOCATION DETAILED: LEFT ANTERIOR PROXIMAL UPPER ARM
LOCATION DETAILED: RIGHT ANTIHELIX
LOCATION DETAILED: RIGHT DISTAL POSTERIOR THIGH
LOCATION DETAILED: LEFT KNEE
LOCATION DETAILED: LEFT DISTAL POSTERIOR UPPER ARM
LOCATION DETAILED: RIGHT DISTAL POSTERIOR UPPER ARM
LOCATION DETAILED: RIGHT ANTERIOR DISTAL UPPER ARM
LOCATION DETAILED: LEFT DISTAL POSTERIOR THIGH
LOCATION DETAILED: RIGHT KNEE
LOCATION DETAILED: RIGHT PROXIMAL PRETIBIAL REGION
LOCATION DETAILED: RIGHT VENTRAL PROXIMAL FOREARM
LOCATION DETAILED: LEFT VENTRAL DISTAL FOREARM
LOCATION DETAILED: LEFT LATERAL UPPER BACK
LOCATION DETAILED: RIGHT SUPERIOR MEDIAL MIDBACK
LOCATION DETAILED: PERIUMBILICAL SKIN
LOCATION DETAILED: LEFT PROXIMAL PRETIBIAL REGION
LOCATION DETAILED: LEFT ANTERIOR SHOULDER
LOCATION DETAILED: LEFT ANTERIOR DISTAL UPPER ARM
LOCATION DETAILED: RIGHT ANTERIOR PROXIMAL UPPER ARM
LOCATION DETAILED: LEFT CENTRAL ZYGOMA
LOCATION DETAILED: LEFT SUPERIOR CRUS OF ANTIHELIX

## 2021-02-16 ASSESSMENT — LOCATION ZONE DERM
LOCATION ZONE: FACE
LOCATION ZONE: NOSE
LOCATION ZONE: EAR
LOCATION ZONE: LEG
LOCATION ZONE: TRUNK
LOCATION ZONE: ARM

## 2021-02-16 ASSESSMENT — LOCATION SIMPLE DESCRIPTION DERM
LOCATION SIMPLE: RIGHT FOREARM
LOCATION SIMPLE: RIGHT LOWER BACK
LOCATION SIMPLE: LEFT UPPER ARM
LOCATION SIMPLE: LEFT SHOULDER
LOCATION SIMPLE: RIGHT EAR
LOCATION SIMPLE: RIGHT KNEE
LOCATION SIMPLE: ABDOMEN
LOCATION SIMPLE: LEFT UPPER BACK
LOCATION SIMPLE: LEFT EAR
LOCATION SIMPLE: LEFT PRETIBIAL REGION
LOCATION SIMPLE: LEFT ZYGOMA
LOCATION SIMPLE: LEFT FOREARM
LOCATION SIMPLE: NOSE
LOCATION SIMPLE: UPPER BACK
LOCATION SIMPLE: RIGHT PRETIBIAL REGION
LOCATION SIMPLE: RIGHT UPPER ARM
LOCATION SIMPLE: RIGHT POSTERIOR THIGH
LOCATION SIMPLE: LEFT THIGH
LOCATION SIMPLE: LEFT POSTERIOR THIGH
LOCATION SIMPLE: LEFT KNEE
LOCATION SIMPLE: RIGHT THIGH

## 2021-02-16 NOTE — PROCEDURE: MIPS QUALITY
Quality 138: Melanoma: Coordination Of Care: A treatment plan was communicated to the physicians providing continuing care within one month of diagnosis outlining: diagnosis, tumor thickness and a plan for surgery or alternate care.
Quality 226: Preventive Care And Screening: Tobacco Use: Screening And Cessation Intervention: Patient screened for tobacco use and is an ex/non-smoker
Quality 137: Melanoma: Continuity Of Care - Recall System: Patient information entered into a recall system that includes: target date for the next exam specified AND a process to follow up with patients regarding missed or unscheduled appointments
Detail Level: Detailed
Quality 402: Tobacco Use And Help With Quitting Among Adolescents: Patient screened for tobacco and never smoked
Quality 130: Documentation Of Current Medications In The Medical Record: Current Medications Documented

## 2021-02-16 NOTE — HPI: EVALUATION OF SKIN LESION(S)
What Type Of Note Output Would You Prefer (Optional)?: Standard Output
How Severe Are Your Spot(S)?: mild
Have Your Spot(S) Been Treated In The Past?: has not been treated
Hpi Title: Evaluation of Skin Lesions
Location: Left posterior shoulder
Year Removed: 2020

## 2021-02-16 NOTE — PROCEDURE: BIOPSY BY SHAVE METHOD
Detail Level: Detailed
Depth Of Biopsy: dermis
Was A Bandage Applied: Yes
Size Of Lesion In Cm: 0.5
X Size Of Lesion In Cm: 0.4
Biopsy Type: H and E
Biopsy Method: Dermablade
Anesthesia Type: 1% lidocaine with epinephrine
Additional Anesthesia Volume In Cc (Will Not Render If 0): 0
Hemostasis: Drysol
Wound Care: Petrolatum
Dressing: bandage
Destruction After The Procedure: No
Type Of Destruction Used: Curettage
Curettage Text: The wound bed was treated with curettage after the biopsy was performed.
Cryotherapy Text: The wound bed was treated with cryotherapy after the biopsy was performed.
Electrodesiccation Text: The wound bed was treated with electrodesiccation after the biopsy was performed.
Electrodesiccation And Curettage Text: The wound bed was treated with electrodesiccation and curettage after the biopsy was performed.
Silver Nitrate Text: The wound bed was treated with silver nitrate after the biopsy was performed.
Lab: [id number]
Lab Facility: 
Consent: Written consent was obtained and risks were reviewed including but not limited to scarring, infection, bleeding, scabbing, incomplete removal, nerve damage and allergy to anesthesia.
Post-Care Instructions: I reviewed with the patient in detail post-care instructions. Patient is to keep the biopsy site dry overnight, and then apply bacitracin twice daily until healed. Patient may apply hydrogen peroxide soaks to remove any crusting.
Notification Instructions: Patient will be notified of biopsy results. However, patient instructed to call the office if not contacted within 2 weeks.
Billing Type: Third-Party Bill
Information: Selecting Yes will display possible errors in your note based on the variables you have selected. This validation is only offered as a suggestion for you. PLEASE NOTE THAT THE VALIDATION TEXT WILL BE REMOVED WHEN YOU FINALIZE YOUR NOTE. IF YOU WANT TO FAX A PRELIMINARY NOTE YOU WILL NEED TO TOGGLE THIS TO 'NO' IF YOU DO NOT WANT IT IN YOUR FAXED NOTE.

## 2021-02-16 NOTE — PROCEDURE: ADDITIONAL NOTES
Render Risk Assessment In Note?: no
Detail Level: Simple
Additional Notes: [id number]\\nNo residual lesion

## 2021-03-11 ENCOUNTER — HOSPITAL ENCOUNTER (OUTPATIENT)
Dept: RADIOLOGY | Facility: MEDICAL CENTER | Age: 61
End: 2021-03-11
Attending: ORTHOPAEDIC SURGERY
Payer: COMMERCIAL

## 2021-03-11 DIAGNOSIS — S52.571A RADIUS FRACTURE DIE PUNCH, CLOSED, RIGHT, INITIAL ENCOUNTER: ICD-10-CM

## 2021-03-11 PROCEDURE — 73200 CT UPPER EXTREMITY W/O DYE: CPT | Mod: RT

## 2021-03-15 DIAGNOSIS — Z23 NEED FOR VACCINATION: ICD-10-CM

## 2021-03-18 ENCOUNTER — OFFICE VISIT (OUTPATIENT)
Dept: RHEUMATOLOGY | Facility: MEDICAL CENTER | Age: 61
End: 2021-03-18
Payer: COMMERCIAL

## 2021-03-18 VITALS
BODY MASS INDEX: 30.57 KG/M2 | WEIGHT: 218.4 LBS | OXYGEN SATURATION: 94 % | TEMPERATURE: 98.2 F | DIASTOLIC BLOOD PRESSURE: 68 MMHG | HEIGHT: 71 IN | HEART RATE: 98 BPM | SYSTOLIC BLOOD PRESSURE: 138 MMHG

## 2021-03-18 DIAGNOSIS — Z51.81 MEDICATION MONITORING ENCOUNTER: ICD-10-CM

## 2021-03-18 DIAGNOSIS — K76.0 FATTY LIVER: ICD-10-CM

## 2021-03-18 DIAGNOSIS — L40.50 PSORIATIC ARTHRITIS (HCC): ICD-10-CM

## 2021-03-18 DIAGNOSIS — I10 ESSENTIAL HYPERTENSION: ICD-10-CM

## 2021-03-18 DIAGNOSIS — R74.8 ELEVATED CPK: ICD-10-CM

## 2021-03-18 PROCEDURE — 99214 OFFICE O/P EST MOD 30 MIN: CPT | Performed by: INTERNAL MEDICINE

## 2021-03-18 ASSESSMENT — FIBROSIS 4 INDEX: FIB4 SCORE: 1.86

## 2021-03-18 NOTE — LETTER
March 18, 2021        Evens Garcia    T Whom it may Concern,  The above named patient is getting treated for psoriatic arthritis diagnosed about 2012. Typically affected joints include left lateral foot, right index finger. Patient occasionally gets psoriasis in scalp along forehead hair line. Patient currently is on Cosentyx 150 mg subcutaneously with excellent control of patients psoriatic arthritis with full range of motion of all joints. Patient is able to do all ADL's without limitations.      Henny Yuan M.D.[facility]

## 2021-03-18 NOTE — PROGRESS NOTES
Chief Complaint- joint pain     Subjective:   Evens Garcia is a 60 y.o. male here today for follow up of rheumatological issues    This is a follow-up visit for this patient who we see in this clinic for psoriatic arthritis.  Patient has a diagnosis of psoriatic arthritis since about 2013.  Patient is currently on Cosentyx at 150 mg subcu monthly with great benefit.  Patient states his joints are doing great and rarely has problems with his psoriasis.   Patient denies any side effects from the medication, denies any unexplained weight loss, denies any fevers of unknown etiology, denies any GI upset, denies any rashes, denies any new joint swelling, denies recurrent infections.     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  There are certain requirements including physical exam findings, medication notifications etc. the patient is requiring today.      Additional issues include the low-level elevation of CPK of unclear etiology, patient was supposed to have CPK isoenzymes done but labs have not been done as of yet.    Comorbidities include elevated liver functions status post liver ultrasound indicating fatty liver with all other negative serologies.      Additional comorbidity includes chronic left knee pain status post skiing accident with a meniscal injury status post rehab and patient is going to be trying to ski again this year.  Patient also history of melanoma resection.     S/p MTX-nausea, elevated LFTs  S/p sulfasalazine-no benefit  S/p humira-stopped secondary to development of melanoma.        HBsAg/HBcAb IgM neg 7/2020 [facility]  HCV neg 7/2020 [facility]  Quantiferon Gold neg 7/2020 [facility]   Anti-actin ab neg 7/2018-[facility]  Mitochondrial ab neg 7/2018 [facility]  CCP neg 1/2018 [facility]  RF neg 3/2013  TINA neg 3/2013  Uric acid 4.9 1/2018 [facility]  Feet x-rays 1/2017-indicates erosive arthritis on the left fifth MTP joint  Hand x-rays  1/2017-indicates DJD  SI joint x-rays 1/2017-no pathology  Liver ultrasound 7/2018-indicates fatty liver     U-1 neg 2/2020 [facility]  U-2 neg 2/2020 [facility]  Mi-2 neg 2/2020 [facility]  Ku neg2/2020 [facility]  SRP neg 2/2020 [facility]  PL-7 neg 2/2020 [facility]  PL-12 neg 2/2020 [facility]  E-J neg 2/2020 [facility]  O-J neg 2/2020 [facility]  RO-52 pos 2/2020 [facility]  PM-Scl neg 2/2020 [facility]  SELENA-1 neg 2/2020 [facility]  Anti p155/140 (TIF-1) neg 2/2020 [facility]  MDA5 (CADM 140) neg 2/2020 [facility]  SAE1 neg 2/2020 [facility]  U3 RNP neg 2/2020 [facility]  Anti CN1A ab neg 3/2020         Current medicines (including changes today)  Current Outpatient Medications   Medication Sig Dispense Refill   • Secukinumab (COSENTYX SENSOREADY PEN) 150 MG/ML Solution Auto-injector Inject 150 mg under the skin Q30 DAYS. 3 Each 1   • VITAMIN D PO Take  by mouth.     • Ascorbic Acid (VITAMIN C PO) Take  by mouth.     • VITAMIN E PO Take  by mouth.     • omeprazole (PRILOSEC) 20 MG delayed-release capsule Take 1 Cap by mouth every day. 90 Cap 3   • ibuprofen (MOTRIN) 200 MG TABS Take 200 mg by mouth every 6 hours as needed. Takes two prn      • azithromycin (ZITHROMAX) 250 MG Tab Take 2 tablets (500 mg) PO on day 1 and then take 1 tablet (250 mg) daily on 2-5 (Patient not taking: Reported on 1/7/2021) 1 Quantity Sufficient 0     No current facility-administered medications for this visit.     He  has no past medical history on file.    ROS   Other than what is mentioned in HPI or physical exam, there is no history of headaches, double vision or blurred vision. No temporal tenderness or jaw claudication. No trouble swallowing difficulties or sore throats.  No chest complaints including chest pain, cough or sputum production. No GI complaints including nausea, vomiting, change in bowel habits, or past peptic ulcer disease. No history of blood in the stools. No urinary complaints including dysuria or frequency. No history of alopecia, photosensitivity, oral  "ulcerations, Raynaud's phenomena.       Objective:     /68 (BP Location: Left arm, Patient Position: Sitting, BP Cuff Size: Adult)   Pulse 98   Temp 36.8 °C (98.2 °F) (Temporal)   Ht 1.803 m (5' 11\")   Wt 99.1 kg (218 lb 6.4 oz)   SpO2 94%  Body mass index is 30.46 kg/m².   Physical Exam:    Constitutional: Alert and oriented X3, patient is talkative with good eye contact.Skin: Warm, dry, good turgor, no rashes in visible areas, small area of psoriasis along hairline on right forehead area.Eye: Equal, round and reactive, conjunctiva clear, lids normal EOM intactENMT: Lips without lesions, good dentition, no oropharyngeal ulcers, moist buccal mucosa, pinna without deformityNeck: Trachea midline, no masses, no thyromegaly.Lymph:  No cervical lymphadenopathy, no axillary lymphadenopathy, no supraclavicular lymphadenopathyRespiratory: Unlabored respiratory effort, lungs clear to auscultation, no wheezes, no ronchi.Cardiovascular: Normal S1, S2, no murmur, no edema.Abdomen: Soft, non-tender, no masses, no hepatosplenomegaly.Psych: Alert and oriented x3, normal affect and mood.Neuro: Cranial nerves 2-12 are grossly intact, no loss of sensation LEExt:no joint laxity noted in bilateral arms, no joint laxity noted in bilateral legs, joints look quite good no swan-neck or boutonniere deformities no sausage digits no dactylitis no crossover toes no flexion contractures no nodules no tophi    Lab Results   Component Value Date/Time    HEPBCORIGM Non-Reactive 07/27/2020 06:58 AM    HEPBSAG Non-Reactive 07/27/2020 06:58 AM     Lab Results   Component Value Date/Time    HEPCAB Non-Reactive 07/27/2020 06:58 AM     Lab Results   Component Value Date/Time    SODIUM 137 07/27/2020 06:58 AM    POTASSIUM 3.5 (L) 07/27/2020 06:58 AM    CHLORIDE 103 07/27/2020 06:58 AM    CO2 23 07/27/2020 06:58 AM    GLUCOSE 102 (H) 07/27/2020 06:58 AM    BUN 17 07/27/2020 06:58 AM    CREATININE 0.86 07/27/2020 06:58 AM    CREATININE 1.00 " 03/15/2013 12:30 PM    BUNCREATRAT 22 (H) 03/15/2013 12:30 PM    GLOMRATE >59 11/19/2010 08:43 AM      Lab Results   Component Value Date/Time    WBC 6.4 07/27/2020 06:58 AM    WBC 9.2 03/15/2013 12:30 PM    RBC 5.38 07/27/2020 06:58 AM    RBC 5.33 03/15/2013 12:30 PM    HEMOGLOBIN 16.5 07/27/2020 06:58 AM    HEMATOCRIT 48.7 07/27/2020 06:58 AM    MCV 90.5 07/27/2020 06:58 AM    MCV 83 03/15/2013 12:30 PM    MCH 30.7 07/27/2020 06:58 AM    MCH 28.3 03/15/2013 12:30 PM    MCHC 33.9 07/27/2020 06:58 AM    MPV 9.9 07/27/2020 06:58 AM    NEUTSPOLYS 60.20 07/27/2020 06:58 AM    LYMPHOCYTES 27.20 07/27/2020 06:58 AM    MONOCYTES 8.10 07/27/2020 06:58 AM    EOSINOPHILS 3.60 07/27/2020 06:58 AM    BASOPHILS 0.60 07/27/2020 06:58 AM      Lab Results   Component Value Date/Time    CALCIUM 9.1 07/27/2020 06:58 AM    ASTSGOT 40 07/27/2020 06:58 AM    ALTSGPT 49 07/27/2020 06:58 AM    ALKPHOSPHAT 88 07/27/2020 06:58 AM    TBILIRUBIN 0.6 07/27/2020 06:58 AM    ALBUMIN 4.2 07/27/2020 06:58 AM    TOTPROTEIN 6.9 07/27/2020 06:58 AM     Lab Results   Component Value Date/Time    RHEUMFACTN 8.8 03/15/2013 12:30 PM     Lab Results   Component Value Date/Time    ANADIRECT Negative 03/15/2013 12:30 PM     Lab Results   Component Value Date/Time    SEDRATEWES <1 07/27/2020 06:58 AM     Lab Results   Component Value Date/Time    CPKTOTAL 582 (H) 07/27/2020 06:58 AM     Results for orders placed during the hospital encounter of 01/11/17   DX-JOINT SURVEY-HANDS SINGLE VIEW    Impression 1.  No acute fracture or bone erosion.    2.  Mild osteoarthritis.     Results for orders placed during the hospital encounter of 01/11/17   DX-JOINT SURVEY-FEET SINGLE VIEW    Impression 1.  No acute fracture or dislocation.    2.  Some soft tissue swelling is noted around the MTP joint of the fifth digit of the left foot and subtle bone erosions in the distal metatarsal are identified. Findings could be due to gout. Inflammatory arthropathy is also  possible.     Results for orders placed during the hospital encounter of 12/21/07   DX-ANKLE 3+ VIEWS    Impression IMPRESSION:    SOFT TISSUE SWELLING AND EVIDENCE OF A JOINT EFFUSION WITHOUT EVIDENCE OF   ACUTE FRACTURE.      Ikerd        Read By SABA KAUR MD on Dec 21 2007  3:35PM  : STERLING Transcription Date: Dec 22 2007  6:29PM  THIS DOCUMENT HAS BEEN ELECTRONICALLY SIGNED BY: SABA KAUR MD on   Dec 24 2007  9:07AM     Results for orders placed during the hospital encounter of 01/11/17   DX-SACROILIAC JOINTS 3+    Impression Negative exam of the sacroiliac joints.     Results for orders placed in visit on 12/17/12   MR-KNEE-W/O    Impression 1. Advanced patellofemoral compartment osteoarthritis    2. Small medial meniscal tear the junction of the posterior horn and body.    3. Truncated medial meniscal free edge suggesting degeneration or from prior meniscectomy.    4. Very small tear of the lateral meniscus at the junction of posterior and body.    5. Joint effusion     Assessment and Plan:     1. Psoriatic arthritis (HCC)  Clinically doing quite well on Cosentyx 150 mg subcu every month, will continue as such  - HEP B CORE AB IGM; Future  - HEP B SURFACE ANTIGEN; Future  - HEP C VIRUS ANTIBODY; Future  - Quantiferon Gold Plus TB (4 tube); Future  - CBC WITH DIFFERENTIAL; Future  - Comp Metabolic Panel; Future  - Sed Rate; Future  - CK ISOENZYMES SERUM; Future    2. Medication monitoring encounter  On Cosentyx 150 mg subcu every month, patient is due for screening labs, screening labs ordered for patient, patient needs monitoring labs every 6 months, patient due for labs now, labs ordered for patient  We reviewed risks of biological medications with patient including hematological pathology, cancer risks, neurological and infection issues especially in the Covid-19 pandemic environment, patient states understanding.  - HEP B CORE AB IGM; Future  - HEP B SURFACE ANTIGEN; Future  -  HEP C VIRUS ANTIBODY; Future  - Quantiferon Gold Plus TB (4 tube); Future  - CBC WITH DIFFERENTIAL; Future  - Comp Metabolic Panel; Future  - Sed Rate; Future  - CK ISOENZYMES SERUM; Future    3. Fatty liver  May impact the type of medications we can use for this patient's arthritis. We will have to keep this under advisement.  - HEP B CORE AB IGM; Future  - HEP B SURFACE ANTIGEN; Future  - HEP C VIRUS ANTIBODY; Future  - Quantiferon Gold Plus TB (4 tube); Future  - CBC WITH DIFFERENTIAL; Future  - Comp Metabolic Panel; Future  - Sed Rate; Future  - CK ISOENZYMES SERUM; Future    4. Essential hypertension  May impact the type of medications we can use for this patient's arthritis. We will have to keep this under advisement.  - HEP B CORE AB IGM; Future  - HEP B SURFACE ANTIGEN; Future  - HEP C VIRUS ANTIBODY; Future  - Quantiferon Gold Plus TB (4 tube); Future  - CBC WITH DIFFERENTIAL; Future  - Comp Metabolic Panel; Future  - Sed Rate; Future  - CK ISOENZYMES SERUM; Future    5. Elevated CPK  Today check isoenzymes and recheck CPK of note myositis panel negative other than a low positive anti-Ro  - HEP B CORE AB IGM; Future  - HEP B SURFACE ANTIGEN; Future  - HEP C VIRUS ANTIBODY; Future  - Quantiferon Gold Plus TB (4 tube); Future  - CBC WITH DIFFERENTIAL; Future  - Comp Metabolic Panel; Future  - Sed Rate; Future  - CK ISOENZYMES SERUM; Future    Followup: Return in about 6 months (around 9/18/2021). or sooner abdoul Garcia  was seen 30 minutes face-to-face of which more than 50% of the time was spent counseling the patient (excluding time for procedures)  regarding  rheumatological condition and care. Therapy was discussed in detail.      Please note that this dictation was created using voice recognition software. I have made every reasonable attempt to correct obvious errors, but I expect that there are errors of grammar and possibly content that I did not discover before finalizing the  note.

## 2021-03-18 NOTE — LETTER
March 18, 2021        Evens Garcia    T Whom it may Concern,  The above named patient is getting treated for psoriatic arthritis diagnosed about 2012. Typically affected joints include left lateral foot, right index finger. Patient occasionally gets psoriasis in scalp along forehead hair line. Patient currently is on Cosentyx 150 mg subcutaneously with excellent control of patients psoriatic arthritis with full range of motion of all joints. Patient is able to do all ADL's without limitations.Patient currently without side effects from Cosentyx.    Henny Yuan M.D.[facility]

## 2021-04-15 ENCOUNTER — TELEPHONE (OUTPATIENT)
Dept: RHEUMATOLOGY | Facility: MEDICAL CENTER | Age: 61
End: 2021-04-15

## 2021-04-15 DIAGNOSIS — L40.50 PSORIATIC ARTHRITIS (HCC): ICD-10-CM

## 2021-04-15 NOTE — TELEPHONE ENCOUNTER
[employer] called stating he needs to be switched back to Humira instead of the Cosentyx. 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 Humira is approved so he needs to be switched back asap so that he can go back to work.     Please advise and thank you!

## 2021-04-26 ENCOUNTER — OFFICE VISIT (OUTPATIENT)
Dept: RHEUMATOLOGY | Facility: MEDICAL CENTER | Age: 61
End: 2021-04-26
Payer: COMMERCIAL

## 2021-04-26 VITALS
SYSTOLIC BLOOD PRESSURE: 130 MMHG | HEART RATE: 92 BPM | DIASTOLIC BLOOD PRESSURE: 70 MMHG | BODY MASS INDEX: 30.4 KG/M2 | OXYGEN SATURATION: 93 % | WEIGHT: 218 LBS | RESPIRATION RATE: 14 BRPM | TEMPERATURE: 97.5 F

## 2021-04-26 DIAGNOSIS — K76.0 FATTY LIVER: ICD-10-CM

## 2021-04-26 DIAGNOSIS — I10 ESSENTIAL HYPERTENSION: ICD-10-CM

## 2021-04-26 DIAGNOSIS — L40.50 PSORIATIC ARTHRITIS (HCC): ICD-10-CM

## 2021-04-26 DIAGNOSIS — Z79.620 ADALIMUMAB (HUMIRA) LONG-TERM USE: ICD-10-CM

## 2021-04-26 PROCEDURE — 99214 OFFICE O/P EST MOD 30 MIN: CPT | Performed by: INTERNAL MEDICINE

## 2021-04-26 ASSESSMENT — FIBROSIS 4 INDEX: FIB4 SCORE: 1.86

## 2021-04-26 NOTE — PROGRESS NOTES
Chief Complaint- joint pain     Subjective:   Evens Garcia is a 60 y.o. male here today for follow up of rheumatological issues    This is a follow-up visit for this patient who we see in this clinic for psoriatic arthritis.  Patient has a diagnosis of psoriatic arthritis since about 2013.  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        Additional issues include the low-level elevation of CPK of unclear etiology, patient was supposed to have CPK isoenzymes done but labs have not been done as of yet.     Comorbidities include elevated liver functions status post liver ultrasound indicating fatty liver with all other negative serologies.      Additional comorbidity includes chronic left knee pain status post skiing accident with a meniscal injury status post rehab and patient is going to be trying to ski again this year.  Patient also history of melanoma resection.     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        HBsAg/HBcAb IgM neg 7/2020 [facility]  HCV neg 7/2020 [facility]  Quantiferon Gold neg 7/2020 [facility]   Anti-actin ab neg 7/2018-[facility]  Mitochondrial ab neg 7/2018 [facility]  CCP neg 1/2018 [facility]  RF neg 3/2013  TINA neg 3/2013  Uric acid 4.9 1/2018 [facility]  Feet x-rays 1/2017-indicates erosive arthritis on the left fifth MTP joint  Hand x-rays 1/2017-indicates DJD  SI joint x-rays 1/2017-no pathology  Liver ultrasound 7/2018-indicates fatty liver     U-1 neg 2/2020 [facility]  U-2 neg 2/2020 [facility]  Mi-2 neg 2/2020 [facility]  Ku neg2/2020 [facility]  SRP neg 2/2020 [facility]  PL-7 neg  2/2020 [facility]  PL-12 neg 2/2020 [facility]  E-J neg 2/2020 [facility]  O-J neg 2/2020 [facility]  RO-52 pos 2/2020 [facility]  PM-Scl neg 2/2020 [facility]  SELENA-1 neg 2/2020 [facility]  Anti p155/140 (TIF-1) neg 2/2020 [facility]  MDA5 (CADM 140) neg 2/2020 [facility]  SAE1 neg 2/2020 [facility]  U3 RNP neg 2/2020 [facility]  Anti CN1A ab neg 3/2020       Current Outpatient Medications   Medication Sig Dispense Refill   • Adalimumab 40 MG/0.4ML Pen-injector Kit Inject 40 mg under the skin every 14 days. 6 Each 1   • VITAMIN D PO Take  by mouth.     • Ascorbic Acid (VITAMIN C PO) Take  by mouth.     • VITAMIN E PO Take  by mouth.     • omeprazole (PRILOSEC) 20 MG delayed-release capsule Take 1 Cap by mouth every day. 90 Cap 3   • ibuprofen (MOTRIN) 200 MG TABS Take 200 mg by mouth every 6 hours as needed. Takes two prn      • Secukinumab (COSENTYX SENSOREADY PEN) 150 MG/ML Solution Auto-injector Inject 150 mg under the skin Q30 DAYS. (Patient not taking: Reported on 4/26/2021) 3 Each 1     No current facility-administered medications for this visit.     He  has no past medical history on file.    ROS   Other than what is mentioned in HPI or physical exam, there is no history of headaches, double vision or blurred vision. No temporal tenderness or jaw claudication. No trouble swallowing difficulties or sore throats.  No chest complaints including chest pain, cough or sputum production. No GI complaints including nausea, vomiting, change in bowel habits, or past peptic ulcer disease. No history of blood in the stools. No urinary complaints including dysuria or frequency. No history of alopecia, photosensitivity, oral ulcerations, Raynaud's phenomena.       Objective:     /70   Pulse 92   Temp 36.4 °C (97.5 °F) (Temporal)   Resp 14   Wt 98.9 kg (218 lb)   SpO2 93%  Body mass index is 30.4 kg/m².   Physical Exam:    Constitutional: Alert and oriented X3, patient is talkative with good eye contact.Skin: Warm, dry, good turgor, dry skin on  the extensor surfaces of bilateral elbows.Eye: Equal, round and reactive, conjunctiva clear, lids normal EOM intactENMT: Lips without lesions, good dentition, no oropharyngeal ulcers, moist buccal mucosa, pinna without deformityNeck: Trachea midline, no masses, no thyromegaly.Lymph:  No cervical lymphadenopathy, no axillary lymphadenopathy, no supraclavicular lymphadenopathyRespiratory: Unlabored respiratory effort, lungs clear to auscultation, no wheezes, no ronchi.Cardiovascular: Normal S1, S2, no murmur, no edema.Abdomen: Soft, non-tender, no masses, no hepatosplenomegaly.Psych: Alert and oriented x3, normal affect and mood.Neuro: Cranial nerves 2-12 are grossly intact, no loss of sensation LEExt:no joint laxity noted in bilateral arms, no joint laxity noted in bilateral legs, no sausage digits no dactylitis no crossover toes no splay toes no flexion contractures no nodules no tophi, shoulders full range of motion without limitations    Lab Results   Component Value Date/Time    HEPBCORIGM Non-Reactive 07/27/2020 06:58 AM    HEPBSAG Non-Reactive 07/27/2020 06:58 AM     Lab Results   Component Value Date/Time    HEPCAB Non-Reactive 07/27/2020 06:58 AM     Lab Results   Component Value Date/Time    SODIUM 137 07/27/2020 06:58 AM    POTASSIUM 3.5 (L) 07/27/2020 06:58 AM    CHLORIDE 103 07/27/2020 06:58 AM    CO2 23 07/27/2020 06:58 AM    GLUCOSE 102 (H) 07/27/2020 06:58 AM    BUN 17 07/27/2020 06:58 AM    CREATININE 0.86 07/27/2020 06:58 AM    CREATININE 1.00 03/15/2013 12:30 PM    BUNCREATRAT 22 (H) 03/15/2013 12:30 PM    GLOMRATE >59 11/19/2010 08:43 AM      Lab Results   Component Value Date/Time    WBC 6.4 07/27/2020 06:58 AM    WBC 9.2 03/15/2013 12:30 PM    RBC 5.38 07/27/2020 06:58 AM    RBC 5.33 03/15/2013 12:30 PM    HEMOGLOBIN 16.5 07/27/2020 06:58 AM    HEMATOCRIT 48.7 07/27/2020 06:58 AM    MCV 90.5 07/27/2020 06:58 AM    MCV 83 03/15/2013 12:30 PM    MCH 30.7 07/27/2020 06:58 AM    MCH 28.3 03/15/2013  12:30 PM    MCHC 33.9 07/27/2020 06:58 AM    MPV 9.9 07/27/2020 06:58 AM    NEUTSPOLYS 60.20 07/27/2020 06:58 AM    LYMPHOCYTES 27.20 07/27/2020 06:58 AM    MONOCYTES 8.10 07/27/2020 06:58 AM    EOSINOPHILS 3.60 07/27/2020 06:58 AM    BASOPHILS 0.60 07/27/2020 06:58 AM      Lab Results   Component Value Date/Time    CALCIUM 9.1 07/27/2020 06:58 AM    ASTSGOT 40 07/27/2020 06:58 AM    ALTSGPT 49 07/27/2020 06:58 AM    ALKPHOSPHAT 88 07/27/2020 06:58 AM    TBILIRUBIN 0.6 07/27/2020 06:58 AM    ALBUMIN 4.2 07/27/2020 06:58 AM    TOTPROTEIN 6.9 07/27/2020 06:58 AM     Lab Results   Component Value Date/Time    RHEUMFACTN 8.8 03/15/2013 12:30 PM     Lab Results   Component Value Date/Time    ANADIRECT Negative 03/15/2013 12:30 PM     Lab Results   Component Value Date/Time    SEDRATEWES <1 07/27/2020 06:58 AM     Lab Results   Component Value Date/Time    CPKTOTAL 582 (H) 07/27/2020 06:58 AM     Results for orders placed during the hospital encounter of 01/11/17   DX-JOINT SURVEY-HANDS SINGLE VIEW    Impression 1.  No acute fracture or bone erosion.    2.  Mild osteoarthritis.     Results for orders placed during the hospital encounter of 01/11/17   DX-JOINT SURVEY-FEET SINGLE VIEW    Impression 1.  No acute fracture or dislocation.    2.  Some soft tissue swelling is noted around the MTP joint of the fifth digit of the left foot and subtle bone erosions in the distal metatarsal are identified. Findings could be due to gout. Inflammatory arthropathy is also possible.     Results for orders placed during the hospital encounter of 12/21/07   DX-ANKLE 3+ VIEWS    Impression IMPRESSION:    SOFT TISSUE SWELLING AND EVIDENCE OF A JOINT EFFUSION WITHOUT EVIDENCE OF   ACUTE FRACTURE.      [email or web address]        Read By SABA KAUR MD on Dec 21 2007  3:35PM  : STERLING Transcription Date: Dec 22 2007  6:29PM  THIS DOCUMENT HAS BEEN ELECTRONICALLY SIGNED BY: SABA KAUR MD on   Dec 24 2007  9:07AM     Results for  orders placed during the hospital encounter of 01/11/17   DX-SACROILIAC JOINTS 3+    Impression Negative exam of the sacroiliac joints.     Results for orders placed in visit on 12/17/12   MR-KNEE-W/O    Impression 1. Advanced patellofemoral compartment osteoarthritis    2. Small medial meniscal tear the junction of the posterior horn and body.    3. Truncated medial meniscal free edge suggesting degeneration or from prior meniscectomy.    4. Very small tear of the lateral meniscus at the junction of posterior and body.    5. Joint effusion     Assessment and Plan:     1. Psoriatic arthritis (HCC)  Switch from Cosentyx to Humira 40 mg subcu every 2 weeks per aviation guidelines per patient report, letter written for patient to aviation physician as requested.    2. Adalimumab (Humira) long-term use  On Humira 40 mg subcu every 2 weeks, screening labs are up-to-date next screening labs due July 2022, patient needs monitoring labs every 6 months, next labs due July 2021, patient has labs ordered for him in [facility]  We reviewed risks of biological medications with patient including hematological pathology, cancer risks, neurological and infection issues especially in the Covid-19 pandemic environment, patient states understanding.    3. Fatty liver  May impact the type of medications we can use for this patient's arthritis. We will have to keep this under advisement.    4. Essential hypertension  May impact the type of medications we can use for this patient's arthritis. We will have to keep this under advisement.    Followup: Return in about 7 weeks (around 6/15/2021). or sooner abdoul Garcia  was seen 30 minutes face-to-face of which more than 50% of the time was spent counseling the patient (excluding time for procedures)  regarding  rheumatological condition and care. Therapy was discussed in detail.      Please note that this dictation was created using voice recognition software. I have made every  reasonable attempt to correct obvious errors, but I expect that there are errors of grammar and possibly content that I did not discover before finalizing the note.

## 2021-04-26 NOTE — LETTER
April 26, 2021        Evens Garcia    To Whom it may Concern,    The above named patient is diagnosed with psoriatic arthritis diagnosed about 2013. Patient is currently being treated with Humira 40 mg subcutaneous every 2 weeks with patients psoriatic arthritis under very good control.    Patient adheres completely to treatment, attached are potential risks for patient.    Patient is responding quite well to treatment.    Psoriatic Arthritis is a chronic condition and will most likely require life long treatment/ management.      Henny Yuan M.D. [facility]

## 2021-05-12 ENCOUNTER — APPOINTMENT (RX ONLY)
Dept: URBAN - METROPOLITAN AREA CLINIC 20 | Facility: CLINIC | Age: 61
Setting detail: DERMATOLOGY
End: 2021-05-12

## 2021-05-12 DIAGNOSIS — Z85.820 PERSONAL HISTORY OF MALIGNANT MELANOMA OF SKIN: ICD-10-CM

## 2021-05-12 DIAGNOSIS — Z71.89 OTHER SPECIFIED COUNSELING: ICD-10-CM

## 2021-05-12 DIAGNOSIS — L57.0 ACTINIC KERATOSIS: ICD-10-CM

## 2021-05-12 DIAGNOSIS — D18.0 HEMANGIOMA: ICD-10-CM

## 2021-05-12 DIAGNOSIS — D22 MELANOCYTIC NEVI: ICD-10-CM

## 2021-05-12 DIAGNOSIS — L81.4 OTHER MELANIN HYPERPIGMENTATION: ICD-10-CM

## 2021-05-12 DIAGNOSIS — L82.1 OTHER SEBORRHEIC KERATOSIS: ICD-10-CM

## 2021-05-12 PROBLEM — D22.71 MELANOCYTIC NEVI OF RIGHT LOWER LIMB, INCLUDING HIP: Status: ACTIVE | Noted: 2021-05-12

## 2021-05-12 PROBLEM — D18.01 HEMANGIOMA OF SKIN AND SUBCUTANEOUS TISSUE: Status: ACTIVE | Noted: 2021-05-12

## 2021-05-12 PROBLEM — D22.5 MELANOCYTIC NEVI OF TRUNK: Status: ACTIVE | Noted: 2021-05-12

## 2021-05-12 PROBLEM — D22.62 MELANOCYTIC NEVI OF LEFT UPPER LIMB, INCLUDING SHOULDER: Status: ACTIVE | Noted: 2021-05-12

## 2021-05-12 PROBLEM — D22.61 MELANOCYTIC NEVI OF RIGHT UPPER LIMB, INCLUDING SHOULDER: Status: ACTIVE | Noted: 2021-05-12

## 2021-05-12 PROBLEM — D22.72 MELANOCYTIC NEVI OF LEFT LOWER LIMB, INCLUDING HIP: Status: ACTIVE | Noted: 2021-05-12

## 2021-05-12 PROCEDURE — 99213 OFFICE O/P EST LOW 20 MIN: CPT | Mod: 25

## 2021-05-12 PROCEDURE — ? COUNSELING

## 2021-05-12 PROCEDURE — ? SUNSCREEN RECOMMENDATIONS

## 2021-05-12 PROCEDURE — ? OBSERVATION AND MEASURE

## 2021-05-12 PROCEDURE — ? LIQUID NITROGEN

## 2021-05-12 PROCEDURE — 17004 DESTROY PREMAL LESIONS 15/>: CPT

## 2021-05-12 ASSESSMENT — LOCATION DETAILED DESCRIPTION DERM
LOCATION DETAILED: RIGHT PROXIMAL DORSAL FOREARM
LOCATION DETAILED: RIGHT PROXIMAL PRETIBIAL REGION
LOCATION DETAILED: LEFT ANTERIOR SHOULDER
LOCATION DETAILED: RIGHT DISTAL POSTERIOR THIGH
LOCATION DETAILED: LEFT DISTAL DORSAL FOREARM
LOCATION DETAILED: RIGHT ANTERIOR DISTAL UPPER ARM
LOCATION DETAILED: LEFT ANTERIOR PROXIMAL UPPER ARM
LOCATION DETAILED: RIGHT ANTIHELIX
LOCATION DETAILED: NASAL TIP
LOCATION DETAILED: RIGHT SUPERIOR CRUS OF ANTIHELIX
LOCATION DETAILED: PERIUMBILICAL SKIN
LOCATION DETAILED: LEFT VENTRAL DISTAL FOREARM
LOCATION DETAILED: RIGHT ANTERIOR PROXIMAL UPPER ARM
LOCATION DETAILED: RIGHT DORSAL MIDDLE METACARPOPHALANGEAL JOINT
LOCATION DETAILED: LEFT ANTERIOR DISTAL UPPER ARM
LOCATION DETAILED: RIGHT DISTAL POSTERIOR UPPER ARM
LOCATION DETAILED: LEFT DISTAL POSTERIOR THIGH
LOCATION DETAILED: RIGHT ANTERIOR DISTAL THIGH
LOCATION DETAILED: RIGHT SUPERIOR MEDIAL MIDBACK
LOCATION DETAILED: RIGHT DISTAL DORSAL FOREARM
LOCATION DETAILED: LEFT ANTIHELIX
LOCATION DETAILED: LEFT ANTERIOR DISTAL THIGH
LOCATION DETAILED: LEFT PROXIMAL PRETIBIAL REGION
LOCATION DETAILED: RIGHT KNEE
LOCATION DETAILED: RIGHT DORSAL RING METACARPOPHALANGEAL JOINT
LOCATION DETAILED: LEFT ULNAR DORSAL HAND
LOCATION DETAILED: RIGHT RADIAL DORSAL HAND
LOCATION DETAILED: RIGHT MEDIAL ZYGOMA
LOCATION DETAILED: LEFT DISTAL POSTERIOR UPPER ARM
LOCATION DETAILED: LEFT RADIAL DORSAL HAND
LOCATION DETAILED: LEFT KNEE
LOCATION DETAILED: INFERIOR THORACIC SPINE
LOCATION DETAILED: RIGHT VENTRAL PROXIMAL FOREARM

## 2021-05-12 ASSESSMENT — LOCATION ZONE DERM
LOCATION ZONE: HAND
LOCATION ZONE: ARM
LOCATION ZONE: TRUNK
LOCATION ZONE: NOSE
LOCATION ZONE: EAR
LOCATION ZONE: FACE
LOCATION ZONE: LEG

## 2021-05-12 ASSESSMENT — LOCATION SIMPLE DESCRIPTION DERM
LOCATION SIMPLE: LEFT SHOULDER
LOCATION SIMPLE: LEFT EAR
LOCATION SIMPLE: LEFT FOREARM
LOCATION SIMPLE: RIGHT PRETIBIAL REGION
LOCATION SIMPLE: LEFT THIGH
LOCATION SIMPLE: NOSE
LOCATION SIMPLE: UPPER BACK
LOCATION SIMPLE: ABDOMEN
LOCATION SIMPLE: LEFT POSTERIOR THIGH
LOCATION SIMPLE: LEFT HAND
LOCATION SIMPLE: RIGHT POSTERIOR THIGH
LOCATION SIMPLE: RIGHT EAR
LOCATION SIMPLE: RIGHT ZYGOMA
LOCATION SIMPLE: RIGHT THIGH
LOCATION SIMPLE: RIGHT LOWER BACK
LOCATION SIMPLE: RIGHT HAND
LOCATION SIMPLE: RIGHT KNEE
LOCATION SIMPLE: LEFT PRETIBIAL REGION
LOCATION SIMPLE: LEFT UPPER ARM
LOCATION SIMPLE: RIGHT UPPER ARM
LOCATION SIMPLE: LEFT KNEE
LOCATION SIMPLE: RIGHT FOREARM

## 2021-06-23 ENCOUNTER — HOSPITAL ENCOUNTER (OUTPATIENT)
Dept: LAB | Facility: MEDICAL CENTER | Age: 61
End: 2021-06-23
Attending: INTERNAL MEDICINE
Payer: COMMERCIAL

## 2021-06-23 DIAGNOSIS — I10 ESSENTIAL HYPERTENSION: ICD-10-CM

## 2021-06-23 DIAGNOSIS — R74.8 ELEVATED CPK: ICD-10-CM

## 2021-06-23 DIAGNOSIS — Z51.81 MEDICATION MONITORING ENCOUNTER: ICD-10-CM

## 2021-06-23 DIAGNOSIS — L40.50 PSORIATIC ARTHRITIS (HCC): ICD-10-CM

## 2021-06-23 DIAGNOSIS — K76.0 FATTY LIVER: ICD-10-CM

## 2021-06-23 LAB
ALBUMIN SERPL BCP-MCNC: 4.4 G/DL (ref 3.2–4.9)
ALBUMIN/GLOB SERPL: 1.5 G/DL
ALP SERPL-CCNC: 105 U/L (ref 30–99)
ALT SERPL-CCNC: 56 U/L (ref 2–50)
ANION GAP SERPL CALC-SCNC: 11 MMOL/L (ref 7–16)
AST SERPL-CCNC: 43 U/L (ref 12–45)
BASOPHILS # BLD AUTO: 0.7 % (ref 0–1.8)
BASOPHILS # BLD: 0.05 K/UL (ref 0–0.12)
BILIRUB SERPL-MCNC: 0.4 MG/DL (ref 0.1–1.5)
BUN SERPL-MCNC: 18 MG/DL (ref 8–22)
CALCIUM SERPL-MCNC: 9.1 MG/DL (ref 8.5–10.5)
CHLORIDE SERPL-SCNC: 108 MMOL/L (ref 96–112)
CO2 SERPL-SCNC: 24 MMOL/L (ref 20–33)
CREAT SERPL-MCNC: 1.04 MG/DL (ref 0.5–1.4)
EOSINOPHIL # BLD AUTO: 0.23 K/UL (ref 0–0.51)
EOSINOPHIL NFR BLD: 3.1 % (ref 0–6.9)
ERYTHROCYTE [DISTWIDTH] IN BLOOD BY AUTOMATED COUNT: 42.2 FL (ref 35.9–50)
GLOBULIN SER CALC-MCNC: 2.9 G/DL (ref 1.9–3.5)
GLUCOSE SERPL-MCNC: 103 MG/DL (ref 65–99)
HCT VFR BLD AUTO: 52.3 % (ref 42–52)
HGB BLD-MCNC: 17.3 G/DL (ref 14–18)
IMM GRANULOCYTES # BLD AUTO: 0.02 K/UL (ref 0–0.11)
IMM GRANULOCYTES NFR BLD AUTO: 0.3 % (ref 0–0.9)
LYMPHOCYTES # BLD AUTO: 2.01 K/UL (ref 1–4.8)
LYMPHOCYTES NFR BLD: 27.5 % (ref 22–41)
MCH RBC QN AUTO: 30.3 PG (ref 27–33)
MCHC RBC AUTO-ENTMCNC: 33.1 G/DL (ref 33.7–35.3)
MCV RBC AUTO: 91.6 FL (ref 81.4–97.8)
MONOCYTES # BLD AUTO: 0.68 K/UL (ref 0–0.85)
MONOCYTES NFR BLD AUTO: 9.3 % (ref 0–13.4)
NEUTROPHILS # BLD AUTO: 4.33 K/UL (ref 1.82–7.42)
NEUTROPHILS NFR BLD: 59.1 % (ref 44–72)
NRBC # BLD AUTO: 0 K/UL
NRBC BLD-RTO: 0 /100 WBC
PLATELET # BLD AUTO: 185 K/UL (ref 164–446)
PMV BLD AUTO: 10.6 FL (ref 9–12.9)
POTASSIUM SERPL-SCNC: 4.3 MMOL/L (ref 3.6–5.5)
PROT SERPL-MCNC: 7.3 G/DL (ref 6–8.2)
RBC # BLD AUTO: 5.71 M/UL (ref 4.7–6.1)
SODIUM SERPL-SCNC: 143 MMOL/L (ref 135–145)
WBC # BLD AUTO: 7.3 K/UL (ref 4.8–10.8)

## 2021-06-23 PROCEDURE — 85652 RBC SED RATE AUTOMATED: CPT

## 2021-06-23 PROCEDURE — 80053 COMPREHEN METABOLIC PANEL: CPT

## 2021-06-23 PROCEDURE — 36415 COLL VENOUS BLD VENIPUNCTURE: CPT

## 2021-06-23 PROCEDURE — 85025 COMPLETE CBC W/AUTO DIFF WBC: CPT

## 2021-06-24 LAB — ERYTHROCYTE [SEDIMENTATION RATE] IN BLOOD BY WESTERGREN METHOD: 4 MM/HOUR (ref 0–20)

## 2021-07-08 ENCOUNTER — OFFICE VISIT (OUTPATIENT)
Dept: RHEUMATOLOGY | Facility: MEDICAL CENTER | Age: 61
End: 2021-07-08
Payer: COMMERCIAL

## 2021-07-08 VITALS
SYSTOLIC BLOOD PRESSURE: 120 MMHG | BODY MASS INDEX: 29.43 KG/M2 | HEART RATE: 82 BPM | TEMPERATURE: 97.8 F | RESPIRATION RATE: 14 BRPM | WEIGHT: 211 LBS | OXYGEN SATURATION: 95 % | DIASTOLIC BLOOD PRESSURE: 62 MMHG

## 2021-07-08 DIAGNOSIS — I10 ESSENTIAL HYPERTENSION: ICD-10-CM

## 2021-07-08 DIAGNOSIS — L40.50 PSORIATIC ARTHRITIS (HCC): ICD-10-CM

## 2021-07-08 DIAGNOSIS — Z79.620 ADALIMUMAB (HUMIRA) LONG-TERM USE: ICD-10-CM

## 2021-07-08 DIAGNOSIS — K76.0 FATTY LIVER: ICD-10-CM

## 2021-07-08 PROCEDURE — 99214 OFFICE O/P EST MOD 30 MIN: CPT | Performed by: INTERNAL MEDICINE

## 2021-07-08 ASSESSMENT — FIBROSIS 4 INDEX: FIB4 SCORE: 1.89

## 2021-07-08 NOTE — PROGRESS NOTES
Chief Complaint- joint pain     Subjective:   Evens Garcia is a 61 y.o. male here today for follow up of rheumatological issues    This is a follow-up visit for this patient who we see in this clinic for psoriatic arthritis.  Patient has had psoriatic arthritis since about 2013.  Patient currently on Humira 40 mg subcu every 2 weeks with benefit.  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  Of note recent sedimentation rate equals 4 June 2021.     Comorbidities include elevated liver functions status post liver ultrasound indicating fatty liver with all other negative serologies.      Additional comorbidity includes chronic left knee pain status post skiing accident with a meniscal injury.  Patient also history of melanoma resection.     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     U-1 neg 2/2020 [facility]  U-2 neg 2/2020 [facility]  Mi-2 neg 2/2020 [facility]  Ku neg2/2020 [facility]  SRP neg 2/2020 [facility]  PL-7 neg 2/2020 [facility]  PL-12 neg 2/2020 [facility]  E-J neg 2/2020 [facility]  O-J neg 2/2020 [facility]  RO-52 pos 2/2020 [facility]  PM-Scl neg 2/2020 [facility]  SELENA-1 neg 2/2020 [facility]  Anti p155/140 (TIF-1) neg 2/2020 [facility]  MDA5 (CADM 140) neg 2/2020 [facility]  SAE1 neg 2/2020 [facility]  U3 RNP neg 2/2020 [facility]  Anti CN1A ab neg 3/2020     HBsAg/HBcAb IgM neg 7/2020 [facility]  HCV neg 7/2020 [facility]  Quantiferon Gold neg 7/2020 [facility]   Anti-actin ab neg 7/2018-[facility]  Mitochondrial ab neg 7/2018 [facility]  CCP neg 1/2018 [facility]  RF neg 3/2013  TINA neg 3/2013  Uric acid 4.9 1/2018 [facility]  Feet x-rays 1/2017-indicates erosive arthritis on the left fifth MTP joint  Hand x-rays 1/2017-indicates DJD  SI joint x-rays 1/2017-no pathology  Liver ultrasound 7/2018-indicates fatty  liver    Current Outpatient Medications   Medication Sig Dispense Refill   • Multiple Vitamins-Minerals (OCUVITE ADULT 50+ PO) Take  by mouth.     • Adalimumab 40 MG/0.4ML Pen-injector Kit Inject 40 mg under the skin every 14 days. 6 Each 1   • VITAMIN D PO Take  by mouth.     • Ascorbic Acid (VITAMIN C PO) Take  by mouth.     • VITAMIN E PO Take  by mouth.     • omeprazole (PRILOSEC) 20 MG delayed-release capsule Take 1 Cap by mouth every day. 90 Cap 3   • ibuprofen (MOTRIN) 200 MG TABS Take 200 mg by mouth every 6 hours as needed. Takes two prn      • Secukinumab (COSENTYX SENSOREADY PEN) 150 MG/ML Solution Auto-injector Inject 150 mg under the skin Q30 DAYS. (Patient not taking: Reported on 4/26/2021) 3 Each 1     No current facility-administered medications for this visit.     He  has no past medical history on file.    ROS   Other than what is mentioned in HPI or physical exam, there is no history of headaches, double vision or blurred vision. No temporal tenderness or jaw claudication. No trouble swallowing difficulties or sore throats.  No chest complaints including chest pain, cough or sputum production. No GI complaints including nausea, vomiting, change in bowel habits, or past peptic ulcer disease. No history of blood in the stools. No urinary complaints including dysuria or frequency. No history of alopecia, photosensitivity, oral ulcerations, Raynaud's phenomena.       Objective:     /62   Pulse 82   Temp 36.6 °C (97.8 °F) (Temporal)   Resp 14   Wt 95.7 kg (211 lb)   SpO2 95%  Body mass index is 29.43 kg/m².   Physical Exam:    Constitutional: Alert and oriented X3, patient is talkative with good eye contact.Skin: Warm, dry, good turgor, no rashes in visible areas, some dry skin on feet, no marsha psoriatic patches,.Eye: Equal, round and reactive, conjunctiva clear, lids normal EOM intactENMT: Lips without lesions, good dentition, no oropharyngeal ulcers, moist buccal mucosa, pinna without  deformityNeck: Trachea midline, no masses, no thyromegaly.Lymph:  No cervical lymphadenopathy, no axillary lymphadenopathy, no supraclavicular lymphadenopathyRespiratory: Unlabored respiratory effort, lungs clear to auscultation, no wheezes, no ronchi.Cardiovascular: Normal S1, S2, no murmur, no edema.Abdomen: Soft, non-tender, no masses, no hepatosplenomegaly.Psych: Alert and oriented x3, normal affect and mood.Neuro: Cranial nerves 2-12 are grossly intact, no loss of sensation LEExt:no joint laxity noted in bilateral arms, no joint laxity noted in bilateral legs, joints of great no swan-neck or boutonniere deformities no sausage digits shoulders full range of motion without limitations toes without crossover toes without splay toes    Lab Results   Component Value Date/Time    HEPBCORIGM Non-Reactive 07/27/2020 06:58 AM    HEPBSAG Non-Reactive 07/27/2020 06:58 AM     Lab Results   Component Value Date/Time    HEPCAB Non-Reactive 07/27/2020 06:58 AM     Lab Results   Component Value Date/Time    SODIUM 143 06/23/2021 01:29 PM    POTASSIUM 4.3 06/23/2021 01:29 PM    CHLORIDE 108 06/23/2021 01:29 PM    CO2 24 06/23/2021 01:29 PM    GLUCOSE 103 (H) 06/23/2021 01:29 PM    BUN 18 06/23/2021 01:29 PM    CREATININE 1.04 06/23/2021 01:29 PM    CREATININE 1.00 03/15/2013 12:30 PM    BUNCREATRAT 22 (H) 03/15/2013 12:30 PM    GLOMRATE >59 11/19/2010 08:43 AM      Lab Results   Component Value Date/Time    WBC 7.3 06/23/2021 01:29 PM    WBC 9.2 03/15/2013 12:30 PM    RBC 5.71 06/23/2021 01:29 PM    RBC 5.33 03/15/2013 12:30 PM    HEMOGLOBIN 17.3 06/23/2021 01:29 PM    HEMATOCRIT 52.3 (H) 06/23/2021 01:29 PM    MCV 91.6 06/23/2021 01:29 PM    MCV 83 03/15/2013 12:30 PM    MCH 30.3 06/23/2021 01:29 PM    MCH 28.3 03/15/2013 12:30 PM    MCHC 33.1 (L) 06/23/2021 01:29 PM    MPV 10.6 06/23/2021 01:29 PM    NEUTSPOLYS 59.10 06/23/2021 01:29 PM    LYMPHOCYTES 27.50 06/23/2021 01:29 PM    MONOCYTES 9.30 06/23/2021 01:29 PM     EOSINOPHILS 3.10 06/23/2021 01:29 PM    BASOPHILS 0.70 06/23/2021 01:29 PM      Lab Results   Component Value Date/Time    CALCIUM 9.1 06/23/2021 01:29 PM    ASTSGOT 43 06/23/2021 01:29 PM    ALTSGPT 56 (H) 06/23/2021 01:29 PM    ALKPHOSPHAT 105 (H) 06/23/2021 01:29 PM    TBILIRUBIN 0.4 06/23/2021 01:29 PM    ALBUMIN 4.4 06/23/2021 01:29 PM    TOTPROTEIN 7.3 06/23/2021 01:29 PM     Lab Results   Component Value Date/Time    RHEUMFACTN 8.8 03/15/2013 12:30 PM     Lab Results   Component Value Date/Time    ANADIRECT Negative 03/15/2013 12:30 PM     Lab Results   Component Value Date/Time    SEDRATEWES 4 06/23/2021 01:29 PM     Lab Results   Component Value Date/Time    CPKTOTAL 582 (H) 07/27/2020 06:58 AM     Assessment and Plan:     1. Psoriatic arthritis (HCC)  Continue Humira 40 mg subcu every 2 weeks  Today do bilateral feet and bilateral hand x-rays compared to x-rays done January 2017 for any evidence of progression of erosive/inflammatory arthritis  - DX-JOINT SURVEY-HANDS SINGLE VIEW; Future  - DX-JOINT SURVEY-FEET SINGLE VIEW; Future  - Comp Metabolic Panel; Future  - CBC WITH DIFFERENTIAL; Future  - Sed Rate; Future    2. Adalimumab (Humira) long-term use  On Humira 40 mg subcu every 2 weeks, patient needs monitoring labs every 6 months next labs due about December 2021, labs ordered for patient, patient needs screening labs every 2 years neck screening labs due about July 2022  We reviewed risks of biological medications with patient including hematological pathology, cancer risks, neurological and infection issues especially in the Covid-19 pandemic environment, patient states understanding.  - DX-JOINT SURVEY-HANDS SINGLE VIEW; Future  - DX-JOINT SURVEY-FEET SINGLE VIEW; Future  - Comp Metabolic Panel; Future  - CBC WITH DIFFERENTIAL; Future  - Sed Rate; Future    3. Fatty liver  May impact the type of medications we can use for this patient's arthritis. We will have to keep this under advisement.  -  DX-JOINT SURVEY-HANDS SINGLE VIEW; Future  - DX-JOINT SURVEY-FEET SINGLE VIEW; Future  - Comp Metabolic Panel; Future  - CBC WITH DIFFERENTIAL; Future  - Sed Rate; Future    4. Essential hypertension  May impact the type of medications we can use for this patient's arthritis. We will have to keep this under advisement.    Followup: Return in about 6 months (around 1/8/2022). or sooner abdoul Garcia  was seen 30 minutes face-to-face of which more than 50% of the time was spent counseling the patient (excluding time for procedures)  regarding  rheumatological condition and care. Therapy was discussed in detail.      Please note that this dictation was created using voice recognition software. I have made every reasonable attempt to correct obvious errors, but I expect that there are errors of grammar and possibly content that I did not discover before finalizing the note.

## 2021-08-05 ENCOUNTER — APPOINTMENT (RX ONLY)
Dept: URBAN - METROPOLITAN AREA CLINIC 20 | Facility: CLINIC | Age: 61
Setting detail: DERMATOLOGY
End: 2021-08-05

## 2021-08-05 DIAGNOSIS — L57.0 ACTINIC KERATOSIS: ICD-10-CM

## 2021-08-05 DIAGNOSIS — L91.8 OTHER HYPERTROPHIC DISORDERS OF THE SKIN: ICD-10-CM

## 2021-08-05 DIAGNOSIS — Z85.820 PERSONAL HISTORY OF MALIGNANT MELANOMA OF SKIN: ICD-10-CM

## 2021-08-05 DIAGNOSIS — L82.1 OTHER SEBORRHEIC KERATOSIS: ICD-10-CM

## 2021-08-05 DIAGNOSIS — D22 MELANOCYTIC NEVI: ICD-10-CM

## 2021-08-05 DIAGNOSIS — D18.0 HEMANGIOMA: ICD-10-CM

## 2021-08-05 DIAGNOSIS — Z71.89 OTHER SPECIFIED COUNSELING: ICD-10-CM

## 2021-08-05 DIAGNOSIS — L81.4 OTHER MELANIN HYPERPIGMENTATION: ICD-10-CM

## 2021-08-05 PROBLEM — D22.71 MELANOCYTIC NEVI OF RIGHT LOWER LIMB, INCLUDING HIP: Status: ACTIVE | Noted: 2021-08-05

## 2021-08-05 PROBLEM — D22.5 MELANOCYTIC NEVI OF TRUNK: Status: ACTIVE | Noted: 2021-08-05

## 2021-08-05 PROBLEM — D22.72 MELANOCYTIC NEVI OF LEFT LOWER LIMB, INCLUDING HIP: Status: ACTIVE | Noted: 2021-08-05

## 2021-08-05 PROBLEM — D18.01 HEMANGIOMA OF SKIN AND SUBCUTANEOUS TISSUE: Status: ACTIVE | Noted: 2021-08-05

## 2021-08-05 PROBLEM — D22.62 MELANOCYTIC NEVI OF LEFT UPPER LIMB, INCLUDING SHOULDER: Status: ACTIVE | Noted: 2021-08-05

## 2021-08-05 PROBLEM — D22.61 MELANOCYTIC NEVI OF RIGHT UPPER LIMB, INCLUDING SHOULDER: Status: ACTIVE | Noted: 2021-08-05

## 2021-08-05 PROCEDURE — ? SUNSCREEN RECOMMENDATIONS

## 2021-08-05 PROCEDURE — 17003 DESTRUCT PREMALG LES 2-14: CPT

## 2021-08-05 PROCEDURE — ? OBSERVATION AND MEASURE

## 2021-08-05 PROCEDURE — 99213 OFFICE O/P EST LOW 20 MIN: CPT | Mod: 25

## 2021-08-05 PROCEDURE — 17000 DESTRUCT PREMALG LESION: CPT

## 2021-08-05 PROCEDURE — ? LIQUID NITROGEN

## 2021-08-05 PROCEDURE — ? COUNSELING

## 2021-08-05 ASSESSMENT — LOCATION DETAILED DESCRIPTION DERM
LOCATION DETAILED: RIGHT PROXIMAL RADIAL DORSAL FOREARM
LOCATION DETAILED: PERIUMBILICAL SKIN
LOCATION DETAILED: RIGHT SUPERIOR MEDIAL MIDBACK
LOCATION DETAILED: LEFT ANTERIOR DISTAL UPPER ARM
LOCATION DETAILED: RIGHT DISTAL POSTERIOR UPPER ARM
LOCATION DETAILED: LEFT VENTRAL DISTAL FOREARM
LOCATION DETAILED: LEFT PROXIMAL DORSAL FOREARM
LOCATION DETAILED: RIGHT DISTAL POSTERIOR THIGH
LOCATION DETAILED: INFERIOR THORACIC SPINE
LOCATION DETAILED: LEFT DISTAL POSTERIOR UPPER ARM
LOCATION DETAILED: RIGHT ANTERIOR DISTAL THIGH
LOCATION DETAILED: RIGHT VENTRAL PROXIMAL FOREARM
LOCATION DETAILED: RIGHT KNEE
LOCATION DETAILED: RIGHT SUPERIOR LATERAL NECK
LOCATION DETAILED: LEFT ANTERIOR SHOULDER
LOCATION DETAILED: LEFT DISTAL POSTERIOR THIGH
LOCATION DETAILED: RIGHT ANTERIOR PROXIMAL UPPER ARM
LOCATION DETAILED: LEFT KNEE
LOCATION DETAILED: LEFT DISTAL DORSAL FOREARM
LOCATION DETAILED: NASAL TIP
LOCATION DETAILED: LEFT PROXIMAL PRETIBIAL REGION
LOCATION DETAILED: LEFT AXILLARY VAULT
LOCATION DETAILED: RIGHT MEDIAL ZYGOMA
LOCATION DETAILED: RIGHT ANTERIOR DISTAL UPPER ARM
LOCATION DETAILED: RIGHT PROXIMAL PRETIBIAL REGION
LOCATION DETAILED: LEFT RADIAL DORSAL HAND
LOCATION DETAILED: LEFT ANTERIOR DISTAL THIGH
LOCATION DETAILED: LEFT ANTERIOR PROXIMAL UPPER ARM
LOCATION DETAILED: RIGHT PROXIMAL DORSAL FOREARM
LOCATION DETAILED: LEFT ULNAR DORSAL HAND

## 2021-08-05 ASSESSMENT — LOCATION SIMPLE DESCRIPTION DERM
LOCATION SIMPLE: RIGHT ZYGOMA
LOCATION SIMPLE: LEFT POSTERIOR THIGH
LOCATION SIMPLE: RIGHT POSTERIOR THIGH
LOCATION SIMPLE: RIGHT THIGH
LOCATION SIMPLE: RIGHT LOWER BACK
LOCATION SIMPLE: LEFT HAND
LOCATION SIMPLE: RIGHT PRETIBIAL REGION
LOCATION SIMPLE: ABDOMEN
LOCATION SIMPLE: RIGHT UPPER ARM
LOCATION SIMPLE: LEFT AXILLARY VAULT
LOCATION SIMPLE: LEFT KNEE
LOCATION SIMPLE: LEFT THIGH
LOCATION SIMPLE: LEFT UPPER ARM
LOCATION SIMPLE: UPPER BACK
LOCATION SIMPLE: LEFT PRETIBIAL REGION
LOCATION SIMPLE: NOSE
LOCATION SIMPLE: LEFT SHOULDER
LOCATION SIMPLE: NECK
LOCATION SIMPLE: RIGHT FOREARM
LOCATION SIMPLE: RIGHT KNEE
LOCATION SIMPLE: LEFT FOREARM

## 2021-08-05 ASSESSMENT — LOCATION ZONE DERM
LOCATION ZONE: LEG
LOCATION ZONE: AXILLAE
LOCATION ZONE: HAND
LOCATION ZONE: ARM
LOCATION ZONE: TRUNK
LOCATION ZONE: NECK
LOCATION ZONE: NOSE
LOCATION ZONE: FACE

## 2021-08-05 NOTE — PROCEDURE: LIQUID NITROGEN
Show Applicator Variable?: Yes
Detail Level: Detailed
Render Post-Care Instructions In Note?: no
Post-Care Instructions: I reviewed with the patient in detail post-care instructions. Patient is to wear sunprotection, and avoid picking at any of the treated lesions. Pt may apply Vaseline to crusted or scabbing areas.
Consent: The patient's consent was obtained including but not limited to risks of crusting, scabbing, blistering, scarring, darker or lighter pigmentary change, recurrence, incomplete removal and infection.
Duration Of Freeze Thaw-Cycle (Seconds): 0

## 2021-09-07 DIAGNOSIS — L40.50 PSORIATIC ARTHRITIS (HCC): ICD-10-CM

## 2021-09-07 NOTE — TELEPHONE ENCOUNTER
Pt had to change pharmacys    Received request via: [facility]    Was the patient seen in the last year in this department? Yes    Does the patient have an active prescription (recently filled or refills available) for medication(s) requested? No

## 2021-11-29 ENCOUNTER — OFFICE VISIT (OUTPATIENT)
Dept: RHEUMATOLOGY | Facility: MEDICAL CENTER | Age: 61
End: 2021-11-29
Payer: COMMERCIAL

## 2021-11-29 VITALS
HEART RATE: 100 BPM | OXYGEN SATURATION: 95 % | WEIGHT: 217 LBS | DIASTOLIC BLOOD PRESSURE: 62 MMHG | RESPIRATION RATE: 14 BRPM | BODY MASS INDEX: 30.27 KG/M2 | SYSTOLIC BLOOD PRESSURE: 130 MMHG | TEMPERATURE: 97.5 F

## 2021-11-29 DIAGNOSIS — K76.0 FATTY LIVER: ICD-10-CM

## 2021-11-29 DIAGNOSIS — L40.50 PSORIATIC ARTHRITIS (HCC): ICD-10-CM

## 2021-11-29 DIAGNOSIS — I10 ESSENTIAL HYPERTENSION: ICD-10-CM

## 2021-11-29 DIAGNOSIS — Z51.81 MEDICATION MONITORING ENCOUNTER: ICD-10-CM

## 2021-11-29 PROCEDURE — 99214 OFFICE O/P EST MOD 30 MIN: CPT | Performed by: INTERNAL MEDICINE

## 2021-11-29 ASSESSMENT — JOINT PAIN
TOTAL NUMBER OF TENDER JOINTS: 10
TOTAL NUMBER OF SWOLLEN JOINTS: 0

## 2021-11-29 ASSESSMENT — FIBROSIS 4 INDEX: FIB4 SCORE: 1.89

## 2021-11-29 NOTE — PROGRESS NOTES
Chief Complaint- joint pain     Subjective:   Evens Garcia is a 61 y.o. male here today for follow up of rheumatological issues    This is a follow-up visit for this patient who we see in this clinic for psoriatic arthritis.  Patient had a diagnosis of psoriatic arthritis since about 2013 and is currently on Humira 40 mg subcu every 2 weeks.  Initially it helped with patient comes in stating that his psoriasis is flaring up in spite of being on Humira.  Patient had been on Cosentyx 150 mg subcu every month in the past with better benefit and patient is asking to be switched back to Cosentyx 150 mg subcu every month.  Of note last sedimentation rate equals for August 2021.     Comorbidities include elevated liver functions status post liver ultrasound indicating fatty liver with all other negative serologies.      Additional comorbidity includes chronic left knee pain status post skiing accident with a meniscal injury.  Patient also history of melanoma resection.      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     U-1 neg 2/2020 [facility]  U-2 neg 2/2020 [facility]  Mi-2 neg 2/2020 [facility]  Ku neg2/2020 [facility]  SRP neg 2/2020 [facility]  PL-7 neg 2/2020 [facility]  PL-12 neg 2/2020 [facility]  E-J neg 2/2020 [facility]  O-J neg 2/2020 [facility]  RO-52 pos 2/2020 [facility]  PM-Scl neg 2/2020 [facility]  SELENA-1 neg 2/2020 [facility]  Anti p155/140 (TIF-1) neg 2/2020 [facility]  MDA5 (CADM 140) neg 2/2020 [facility]  SAE1 neg 2/2020 [facility]  U3 RNP neg 2/2020 [facility]  Anti CN1A ab neg 3/2020     HBsAg/HBcAb IgM neg 7/2020 [facility]  HCV neg 7/2020 [facility]  Quantiferon Gold neg 7/2020 [facility]   Anti-actin ab neg 7/2018-[facility]  Mitochondrial ab neg 7/2018 [facility]  CCP neg 1/2018 [facility]  RF neg 3/2013  TINA neg 3/2013  Uric acid 4.9 1/2018 [facility]  Feet x-rays  1/2017-indicates erosive arthritis on the left fifth MTP joint  Hand x-rays 1/2017-indicates DJD  SI joint x-rays 1/2017-no pathology  Liver ultrasound 7/2018-indicates fatty liver     Current Outpatient Medications   Medication Sig Dispense Refill   • Secukinumab 150 MG/ML Solution Auto-injector 150 mg SQ every week for 5 weeks then 150 mg SQ every 4 weeks therafter 8 Each 1   • Multiple Vitamins-Minerals (OCUVITE ADULT 50+ PO) Take  by mouth.     • VITAMIN D PO Take  by mouth.     • Ascorbic Acid (VITAMIN C PO) Take  by mouth.     • VITAMIN E PO Take  by mouth.     • ibuprofen (MOTRIN) 200 MG TABS Take 200 mg by mouth every 6 hours as needed. Takes two prn      • omeprazole (PRILOSEC) 20 MG delayed-release capsule Take 1 Cap by mouth every day. (Patient not taking: Reported on 11/29/2021) 90 Cap 3     No current facility-administered medications for this visit.     He  has no past medical history on file.    ROS   Other than what is mentioned in HPI or physical exam, there is no history of headaches, double vision or blurred vision. No temporal tenderness or jaw claudication. No trouble swallowing difficulties or sore throats.  No chest complaints including chest pain, cough or sputum production. No GI complaints including nausea, vomiting, change in bowel habits, or past peptic ulcer disease. No history of blood in the stools. No urinary complaints including dysuria or frequency. No history of alopecia, photosensitivity, oral ulcerations, Raynaud's phenomena.       Objective:     /62   Pulse 100   Temp 36.4 °C (97.5 °F) (Temporal)   Resp 14   Wt 98.4 kg (217 lb)   SpO2 95%  Body mass index is 30.27 kg/m².   Physical Exam:    Constitutional: Alert and oriented X3, patient is talkative with good eye contact.Skin: Warm, dry, good turgor, positive psoriatic plaques bilateral ankles lateral surfaces.Eye: Equal, round and reactive, conjunctiva clear, lids normal EOM intactENMT: Lips without lesions, good  dentition, no oropharyngeal ulcers, moist buccal mucosa, pinna without deformityNeck: Trachea midline, no masses, no thyromegaly.Lymph:  No cervical lymphadenopathy, no axillary lymphadenopathy, no supraclavicular lymphadenopathyRespiratory: Unlabored respiratory effort, lungs clear to auscultation, no wheezes, no ronchi.Cardiovascular: Normal S1, S2, no murmur, no edema.Abdomen: Soft, non-tender, no masses, no hepatosplenomegaly.Psych: Alert and oriented x3, normal affect and mood.Neuro: Cranial nerves 2-12 are grossly intact, no loss of sensation LEExt:no joint laxity noted in bilateral arms, no joint laxity noted in bilateral legs        Lab Results   Component Value Date/Time    HEPBCORIGM Non-Reactive 07/27/2020 06:58 AM    HEPBSAG Non-Reactive 07/27/2020 06:58 AM     Lab Results   Component Value Date/Time    HEPCAB Non-Reactive 07/27/2020 06:58 AM     Lab Results   Component Value Date/Time    SODIUM 143 06/23/2021 01:29 PM    POTASSIUM 4.3 06/23/2021 01:29 PM    CHLORIDE 108 06/23/2021 01:29 PM    CO2 24 06/23/2021 01:29 PM    GLUCOSE 103 (H) 06/23/2021 01:29 PM    BUN 18 06/23/2021 01:29 PM    CREATININE 1.04 06/23/2021 01:29 PM    CREATININE 1.00 03/15/2013 12:30 PM    BUNCREATRAT 22 (H) 03/15/2013 12:30 PM    GLOMRATE >59 11/19/2010 08:43 AM      Lab Results   Component Value Date/Time    WBC 7.3 06/23/2021 01:29 PM    WBC 9.2 03/15/2013 12:30 PM    RBC 5.71 06/23/2021 01:29 PM    RBC 5.33 03/15/2013 12:30 PM    HEMOGLOBIN 17.3 06/23/2021 01:29 PM    HEMATOCRIT 52.3 (H) 06/23/2021 01:29 PM    MCV 91.6 06/23/2021 01:29 PM    MCV 83 03/15/2013 12:30 PM    MCH 30.3 06/23/2021 01:29 PM    MCH 28.3 03/15/2013 12:30 PM    MCHC 33.1 (L) 06/23/2021 01:29 PM    MPV 10.6 06/23/2021 01:29 PM    NEUTSPOLYS 59.10 06/23/2021 01:29 PM    LYMPHOCYTES 27.50 06/23/2021 01:29 PM    MONOCYTES 9.30 06/23/2021 01:29 PM    EOSINOPHILS 3.10 06/23/2021 01:29 PM    BASOPHILS 0.70 06/23/2021 01:29 PM      Lab Results   Component  Value Date/Time    CALCIUM 9.1 06/23/2021 01:29 PM    ASTSGOT 43 06/23/2021 01:29 PM    ALTSGPT 56 (H) 06/23/2021 01:29 PM    ALKPHOSPHAT 105 (H) 06/23/2021 01:29 PM    TBILIRUBIN 0.4 06/23/2021 01:29 PM    ALBUMIN 4.4 06/23/2021 01:29 PM    TOTPROTEIN 7.3 06/23/2021 01:29 PM     Lab Results   Component Value Date/Time    RHEUMFACTN 8.8 03/15/2013 12:30 PM     Lab Results   Component Value Date/Time    ANADIRECT Negative 03/15/2013 12:30 PM     Lab Results   Component Value Date/Time    SEDRATEWES 4 06/23/2021 01:29 PM     Lab Results   Component Value Date/Time    CPKTOTAL 582 (H) 07/27/2020 06:58 AM     Assessment and Plan:     1. Psoriatic arthritis (HCC)  Not fully controlled on Humira, we will switch back to Cosentyx 150 mg subcu every month after loading dose of Cosentyx-milligrams subcu every week for 5 weeks then every 4 weeks thereafter.  - Secukinumab 150 MG/ML Solution Auto-injector; 150 mg SQ every week for 5 weeks then 150 mg SQ every 4 weeks therafter  Dispense: 8 Each; Refill: 1    2. Medication monitoring encounter  Stop Humira, start Cosentyx-milligrams subcu every week for 5 weeks as loading dose and then 150 mg subcu every 4 weeks thereafter  Patient needs screening labs every 2 years next screening labs due July 2022, patient needs monitoring labs every 6 months, next labs due December 2021, patient has lab orders available to him  We reviewed risks of biological medications with patient including hematological pathology, cancer risks, neurological and infection issues especially in the Covid-19 pandemic environment, patient states understanding.  - Secukinumab 150 MG/ML Solution Auto-injector; 150 mg SQ every week for 5 weeks then 150 mg SQ every 4 weeks therafter  Dispense: 8 Each; Refill: 1    3. Fatty liver  May impact the type of medications we can use for this patient's arthritis. We will have to keep this under advisement.    4. Essential hypertension  May impact the type of medications we  can use for this patient's arthritis. We will have to keep this under advisement.    Followup: Return in about 5 weeks (around 1/6/2022). or sooner abdoul Garcia  was seen 30 minutes face-to-face of which more than 50% of the time was spent counseling the patient (excluding time for procedures)  regarding  rheumatological condition and care. Therapy was discussed in detail.      Please note that this dictation was created using voice recognition software. I have made every reasonable attempt to correct obvious errors, but I expect that there are errors of grammar and possibly content that I did not discover before finalizing the note.

## 2022-01-06 ENCOUNTER — OFFICE VISIT (OUTPATIENT)
Dept: RHEUMATOLOGY | Facility: MEDICAL CENTER | Age: 62
End: 2022-01-06
Payer: COMMERCIAL

## 2022-01-06 ENCOUNTER — TELEPHONE (OUTPATIENT)
Dept: RHEUMATOLOGY | Facility: MEDICAL CENTER | Age: 62
End: 2022-01-06

## 2022-01-06 VITALS
TEMPERATURE: 97.7 F | BODY MASS INDEX: 30.68 KG/M2 | DIASTOLIC BLOOD PRESSURE: 60 MMHG | OXYGEN SATURATION: 96 % | SYSTOLIC BLOOD PRESSURE: 122 MMHG | WEIGHT: 220 LBS | HEART RATE: 98 BPM | RESPIRATION RATE: 14 BRPM

## 2022-01-06 DIAGNOSIS — Z51.81 MEDICATION MONITORING ENCOUNTER: ICD-10-CM

## 2022-01-06 DIAGNOSIS — I10 ESSENTIAL HYPERTENSION: ICD-10-CM

## 2022-01-06 DIAGNOSIS — K76.0 FATTY LIVER: ICD-10-CM

## 2022-01-06 DIAGNOSIS — L40.50 PSORIATIC ARTHRITIS (HCC): ICD-10-CM

## 2022-01-06 PROCEDURE — 99214 OFFICE O/P EST MOD 30 MIN: CPT | Performed by: INTERNAL MEDICINE

## 2022-01-06 ASSESSMENT — FIBROSIS 4 INDEX: FIB4 SCORE: 1.89

## 2022-01-06 NOTE — TELEPHONE ENCOUNTER
Please mail lab orders to hepatitis B hepatitis C and QuantiFERON gold to patient, he does his labs at [facility] and request

## 2022-01-06 NOTE — LETTER
January 6, 2022       Patient: Evens Garcia   YOB: 1960   Date of Visit: 1/6/2022         To Whom It May Concern:        The above named patient is diagnosed with psoriatic arthritis diagnosed about 2013. Patient is currently being treated with Cosentyx 150 mg  subcutaneous every 30 days for patients psoriatic arthritis under very good control.     Patient adheres completely to treatment, attached are potential risks for patient.     Patient is responding quite well to treatment.     Psoriatic Arthritis is a chronic condition and will most likely require life long treatment/ management.    If you have any questions or concerns, please don't hesitate to call [phone]          Sincerely,          Henny Yuan M.D.  Electronically Signed

## 2022-01-06 NOTE — PROGRESS NOTES
Chief Complaint- joint pain     Subjective:   Evens Garcia is a 61 y.o. male here today for follow up of rheumatological issues    This is a follow-up visit for this patient who is seen in this clinic for psoriatic arthritis.  Patient has had a diagnosis of psoriatic arthritis since about 2019.  Patient is currently on Cosentyx 150 mg subcu every month with great benefit.  Patient denies any side effects from the medication, denies any unexplained weight loss, denies any fevers of unknown etiology, denies any GI upset, denies any rashes, denies any new joint swelling, denies recurrent infections.  Of note we had ordered hand and feet x-rays at last visit for patient due for evaluation of progression of psoriatic arthritis first patient has not followed up.    Comorbidities include elevated liver functions status post liver ultrasound indicating fatty liver with all other negative serologies.      Additional comorbidity includes chronic left knee pain status post skiing accident with a meniscal injury.  Patient also history of melanoma resection.        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     U-1 neg 2/2020 [facility]  U-2 neg 2/2020 [facility]  Mi-2 neg 2/2020 [facility]  Ku neg2/2020 [facility]  SRP neg 2/2020 [facility]  PL-7 neg 2/2020 [facility]  PL-12 neg 2/2020 [facility]  E-J neg 2/2020 [facility]  O-J neg 2/2020 [facility]  RO-52 pos 2/2020 [facility]  PM-Scl neg 2/2020 [facility]  SELENA-1 neg 2/2020 [facility]  Anti p155/140 (TIF-1) neg 2/2020 [facility]  MDA5 (CADM 140) neg 2/2020 [facility]  SAE1 neg 2/2020 [facility]  U3 RNP neg 2/2020 [facility]  Anti CN1A ab neg 3/2020     HBsAg/HBcAb IgM neg 7/2020 [facility]  HCV neg 7/2020 [facility]  Quantiferon Gold neg 7/2020 [facility]   Anti-actin ab neg 7/2018-[facility]  Mitochondrial ab neg 7/2018 [facility]  CCP neg 1/2018  [facility]  RF neg 3/2013  TINA neg 3/2013  Uric acid 4.9 1/2018 [facility]  Feet x-rays 1/2017-indicates erosive arthritis on the left fifth MTP joint  Hand x-rays 1/2017-indicates DJD  SI joint x-rays 1/2017-no pathology  Liver ultrasound 7/2018-indicates fatty liver      Current Outpatient Medications   Medication Sig Dispense Refill   • Secukinumab 150 MG/ML Solution Auto-injector 150 mg SQ every week for 5 weeks then 150 mg SQ every 4 weeks therafter 8 Each 1   • Multiple Vitamins-Minerals (OCUVITE ADULT 50+ PO) Take  by mouth.     • VITAMIN D PO Take  by mouth.     • Ascorbic Acid (VITAMIN C PO) Take  by mouth.     • ibuprofen (MOTRIN) 200 MG TABS Take 200 mg by mouth every 6 hours as needed. Takes two prn      • VITAMIN E PO Take  by mouth.       No current facility-administered medications for this visit.     He  has no past medical history on file.    ROS   Other than what is mentioned in HPI or physical exam, there is no history of headaches, double vision or blurred vision. No temporal tenderness or jaw claudication. No trouble swallowing difficulties or sore throats.  No chest complaints including chest pain, cough or sputum production. No GI complaints including nausea, vomiting, change in bowel habits, or past peptic ulcer disease. No history of blood in the stools. No urinary complaints including dysuria or frequency. No history of alopecia, photosensitivity, oral ulcerations, Raynaud's phenomena.       Objective:     /60   Pulse 98   Temp 36.5 °C (97.7 °F) (Temporal)   Resp 14   Wt 99.8 kg (220 lb)   SpO2 96%  Body mass index is 30.68 kg/m².   Physical Exam:    Constitutional: Alert and oriented X3, patient is talkative with good eye contact.Skin: Warm, dry, good turgor, no rashes in visible areas, extensive dry skin on lower extremities, but no marsha psoriatic plaques.Eye: Equal, round and reactive, conjunctiva clear, lids normal EOM intactENMT: Lips without lesions, good dentition, no  oropharyngeal ulcers, moist buccal mucosa, pinna without deformityNeck: Trachea midline, no masses, no thyromegaly.Lymph:  No cervical lymphadenopathy, no axillary lymphadenopathy, no supraclavicular lymphadenopathyRespiratory: Unlabored respiratory effort, lungs clear to auscultation, no wheezes, no ronchi.Cardiovascular: Normal S1, S2, no murmur, no edema.Abdomen: Soft, non-tender, no masses, no hepatosplenomegaly.Psych: Alert and oriented x3, normal affect and mood.Neuro: Cranial nerves 2-12 are grossly intact, no loss of sensation LEExt:no joint laxity noted in bilateral arms, no joint laxity noted in bilateral legs, joints of great no swan-neck or boutonniere deformities no sausage digits no dactylitis    Lab Results   Component Value Date/Time    HEPBCORIGM Non-Reactive 07/27/2020 06:58 AM    HEPBSAG Non-Reactive 07/27/2020 06:58 AM     Lab Results   Component Value Date/Time    HEPCAB Non-Reactive 07/27/2020 06:58 AM     Lab Results   Component Value Date/Time    SODIUM 143 06/23/2021 01:29 PM    POTASSIUM 4.3 06/23/2021 01:29 PM    CHLORIDE 108 06/23/2021 01:29 PM    CO2 24 06/23/2021 01:29 PM    GLUCOSE 103 (H) 06/23/2021 01:29 PM    BUN 18 06/23/2021 01:29 PM    CREATININE 1.04 06/23/2021 01:29 PM    CREATININE 1.00 03/15/2013 12:30 PM    BUNCREATRAT 22 (H) 03/15/2013 12:30 PM    GLOMRATE >59 11/19/2010 08:43 AM      Lab Results   Component Value Date/Time    WBC 7.3 06/23/2021 01:29 PM    WBC 9.2 03/15/2013 12:30 PM    RBC 5.71 06/23/2021 01:29 PM    RBC 5.33 03/15/2013 12:30 PM    HEMOGLOBIN 17.3 06/23/2021 01:29 PM    HEMATOCRIT 52.3 (H) 06/23/2021 01:29 PM    MCV 91.6 06/23/2021 01:29 PM    MCV 83 03/15/2013 12:30 PM    MCH 30.3 06/23/2021 01:29 PM    MCH 28.3 03/15/2013 12:30 PM    MCHC 33.1 (L) 06/23/2021 01:29 PM    MPV 10.6 06/23/2021 01:29 PM    NEUTSPOLYS 59.10 06/23/2021 01:29 PM    LYMPHOCYTES 27.50 06/23/2021 01:29 PM    MONOCYTES 9.30 06/23/2021 01:29 PM    EOSINOPHILS 3.10 06/23/2021 01:29  PM    BASOPHILS 0.70 06/23/2021 01:29 PM      Lab Results   Component Value Date/Time    CALCIUM 9.1 06/23/2021 01:29 PM    ASTSGOT 43 06/23/2021 01:29 PM    ALTSGPT 56 (H) 06/23/2021 01:29 PM    ALKPHOSPHAT 105 (H) 06/23/2021 01:29 PM    TBILIRUBIN 0.4 06/23/2021 01:29 PM    ALBUMIN 4.4 06/23/2021 01:29 PM    TOTPROTEIN 7.3 06/23/2021 01:29 PM     Lab Results   Component Value Date/Time    RHEUMFACTN 8.8 03/15/2013 12:30 PM     Lab Results   Component Value Date/Time    ANADIRECT Negative 03/15/2013 12:30 PM     Lab Results   Component Value Date/Time    SEDRATEWES 4 06/23/2021 01:29 PM     Assessment and Plan:     1. Psoriatic arthritis (HCC)  Doing quite well on [id number] mg subcu every month, we will continue with such    2. Medication monitoring encounter  Cosentyx and 150 mg subcu every month, patient needs monitoring labs every 6 months, patient due for labs now, labs ordered for patient  Patient also needs screening labs every 2 years, next screening labs due July 2022, labs ordered for patient  We reviewed risks of biological medications with patient including hematological pathology, cancer risks, neurological and infection issues especially in the Covid-19 pandemic environment, patient states understanding.    3. Fatty liver  May impact the type of medications we can use for this patient's arthritis. We will have to keep this under advisement.    4. Essential hypertension  May impact the type of medications we can use for this patient's arthritis. We will have to keep this under advisement.    Followup: Return in about 6 months (around 7/6/2022). or sooner abdoul Garcia  was seen 30 minutes face-to-face of which more than 50% of the time was spent counseling the patient (excluding time for procedures)  regarding  rheumatological condition and care. Therapy was discussed in detail.      Please note that this dictation was created using voice recognition software. I have made every  reasonable attempt to correct obvious errors, but I expect that there are errors of grammar and possibly content that I did not discover before finalizing the note.

## 2022-09-19 ENCOUNTER — OFFICE VISIT (OUTPATIENT)
Dept: RHEUMATOLOGY | Facility: MEDICAL CENTER | Age: 62
End: 2022-09-19
Attending: INTERNAL MEDICINE
Payer: COMMERCIAL

## 2022-09-19 VITALS
HEART RATE: 94 BPM | RESPIRATION RATE: 14 BRPM | DIASTOLIC BLOOD PRESSURE: 72 MMHG | TEMPERATURE: 97.5 F | OXYGEN SATURATION: 97 % | SYSTOLIC BLOOD PRESSURE: 122 MMHG | BODY MASS INDEX: 31.24 KG/M2 | WEIGHT: 224 LBS

## 2022-09-19 DIAGNOSIS — K76.0 FATTY LIVER: ICD-10-CM

## 2022-09-19 DIAGNOSIS — Z51.81 MEDICATION MONITORING ENCOUNTER: ICD-10-CM

## 2022-09-19 DIAGNOSIS — I10 ESSENTIAL HYPERTENSION: ICD-10-CM

## 2022-09-19 DIAGNOSIS — L40.50 PSORIATIC ARTHRITIS (HCC): ICD-10-CM

## 2022-09-19 PROCEDURE — 99214 OFFICE O/P EST MOD 30 MIN: CPT | Performed by: INTERNAL MEDICINE

## 2022-09-19 PROCEDURE — 99212 OFFICE O/P EST SF 10 MIN: CPT | Performed by: INTERNAL MEDICINE

## 2022-09-19 ASSESSMENT — FIBROSIS 4 INDEX: FIB4 SCORE: 1.93

## 2022-09-19 NOTE — PROGRESS NOTES
Chief Complaint- joint pain     Subjective:   Evens Garcia is a 62 y.o. male here today for follow up of rheumatological issues    This is a follow-up visit for this patient who we see in this clinic for psoriatic arthritis.  Patient has had a diagnosis of psoriatic arthritis since about 2019.  Patient is currently on Cosentyx 150 mg subcu every month, patient feels that he would like to try to go Cosentyx 150 mg subcu every 5 weeks.  Patient states this particular regiment is doing quite well for him. Patient denies any side effects from the medication, denies any unexplained weight loss, denies any fevers of unknown etiology, denies any GI upset, denies any rashes, denies any new joint swelling, denies recurrent infections.      Comorbidities include elevated liver functions status post liver ultrasound indicating fatty liver with all other negative serologies.      Additional comorbidity includes chronic left knee pain status post skiing accident with a meniscal injury.  Patient also history of melanoma resection.        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     U-1 neg 2/2020 [facility]  U-2 neg 2/2020 [facility]  Mi-2 neg 2/2020 [facility]  Ku neg2/2020 [facility]  SRP neg 2/2020 [facility]  PL-7 neg 2/2020 [facility]  PL-12 neg 2/2020 [facility]  E-J neg 2/2020 [facility]  O-J neg 2/2020 [facility]  RO-52 pos 2/2020 [facility]  PM-Scl neg 2/2020 [facility]  SELENA-1 neg 2/2020 [facility]  Anti p155/140 (TIF-1) neg 2/2020 [facility]  MDA5 (CADM 140) neg 2/2020 [facility]  SAE1 neg 2/2020 [facility]  U3 RNP neg 2/2020 [facility]  Anti CN1A ab neg 3/2020     HBsAg/HBcAb IgM neg 7/2020 [facility]  HCV neg 7/2020 [facility]  Quantiferon Gold neg 7/2020 [facility]   Anti-actin ab neg 7/2018[facility]  Mitochondrial ab neg 7/2018 [facility]  CCP neg 1/2018 [facility]  RF neg 3/2013  TINA neg  3/2013  Uric acid 4.9 1/2018 [facility]    Feet x-rays 1/2017-indicates erosive arthritis on the left fifth MTP joint  Hand x-rays 1/2017-indicates DJD  SI joint x-rays 1/2017-no pathology  Liver ultrasound 7/2018-indicates fatty liver        Current Outpatient Medications   Medication Sig Dispense Refill    Secukinumab 150 MG/ML Solution Auto-injector 150 mg sq every 5 weeks 3 Each 1    Multiple Vitamins-Minerals (OCUVITE ADULT 50+ PO) Take  by mouth.      VITAMIN D PO Take  by mouth.      Ascorbic Acid (VITAMIN C PO) Take  by mouth.      VITAMIN E PO Take  by mouth.      ibuprofen (MOTRIN) 200 MG TABS Take 200 mg by mouth every 6 hours as needed. Takes two prn        No current facility-administered medications for this visit.     He  has no past medical history on file.    ROS   Other than what is mentioned in HPI or physical exam, there is no history of headaches, double vision or blurred vision. No temporal tenderness or jaw claudication. No trouble swallowing difficulties or sore throats.  No chest complaints including chest pain, cough or sputum production. No GI complaints including nausea, vomiting, change in bowel habits, or past peptic ulcer disease. No history of blood in the stools. No urinary complaints including dysuria or frequency. No history of alopecia, photosensitivity, oral ulcerations, Raynaud's phenomena.       Objective:     /72   Pulse 94   Temp 36.4 °C (97.5 °F) (Temporal)   Resp 14   Wt 102 kg (224 lb)   SpO2 97%  Body mass index is 31.24 kg/m².   Physical Exam:    Constitutional: Alert and oriented X3, patient is talkative with good eye contact.Skin: Warm, dry, good turgor, no rashes in visible areas, positive small areas of psoriasis on the lower extremities.Eye: Equal, round and reactive, conjunctiva clear, lids normal EOM intactENMT: Lips without lesions, good dentition, no oropharyngeal ulcers, moist buccal mucosa, pinna without deformityNeck: Trachea midline, no masses, no  thyromegaly.Lymph:  No cervical lymphadenopathy, no axillary lymphadenopathy, no supraclavicular lymphadenopathyRespiratory: Unlabored respiratory effort, lungs clear to auscultation, no wheezes, no ronchi.Cardiovascular: Normal S1, S2, Regular rate and rhythm, no murmurs rubs or gallops  Abdomen: Soft, non-tender, no masses, no hepatosplenomegaly.Psych: Alert and oriented x3, normal affect and mood.Neuro: Cranial nerves 2-12 are grossly intact, no loss of sensation LEExt:no joint laxity noted in bilateral arms, no joint laxity noted in bilateral legs, no dactylitis, no enthesitis, no sausage digits, shoulders full range of motion, elbows without flexion contractures, no nodules no tophi, gait without antalgia and without foot drop    Lab Results   Component Value Date/Time    HEPBCORIGM Non-Reactive 07/27/2020 06:58 AM    HEPBSAG Non-Reactive 07/27/2020 06:58 AM     Lab Results   Component Value Date/Time    HEPCAB Non-Reactive 07/27/2020 06:58 AM     Lab Results   Component Value Date/Time    SODIUM 143 06/23/2021 01:29 PM    POTASSIUM 4.3 06/23/2021 01:29 PM    CHLORIDE 108 06/23/2021 01:29 PM    CO2 24 06/23/2021 01:29 PM    GLUCOSE 103 (H) 06/23/2021 01:29 PM    BUN 18 06/23/2021 01:29 PM    CREATININE 1.04 06/23/2021 01:29 PM    CREATININE 1.00 03/15/2013 12:30 PM    BUNCREATRAT 22 (H) 03/15/2013 12:30 PM    GLOMRATE >59 11/19/2010 08:43 AM      Lab Results   Component Value Date/Time    WBC 7.3 06/23/2021 01:29 PM    WBC 9.2 03/15/2013 12:30 PM    RBC 5.71 06/23/2021 01:29 PM    RBC 5.33 03/15/2013 12:30 PM    HEMOGLOBIN 17.3 06/23/2021 01:29 PM    HEMATOCRIT 52.3 (H) 06/23/2021 01:29 PM    MCV 91.6 06/23/2021 01:29 PM    MCV 83 03/15/2013 12:30 PM    MCH 30.3 06/23/2021 01:29 PM    MCH 28.3 03/15/2013 12:30 PM    MCHC 33.1 (L) 06/23/2021 01:29 PM    MPV 10.6 06/23/2021 01:29 PM    NEUTSPOLYS 59.10 06/23/2021 01:29 PM    LYMPHOCYTES 27.50 06/23/2021 01:29 PM    MONOCYTES 9.30 06/23/2021 01:29 PM     EOSINOPHILS 3.10 06/23/2021 01:29 PM    BASOPHILS 0.70 06/23/2021 01:29 PM      Lab Results   Component Value Date/Time    CALCIUM 9.1 06/23/2021 01:29 PM    ASTSGOT 43 06/23/2021 01:29 PM    ALTSGPT 56 (H) 06/23/2021 01:29 PM    ALKPHOSPHAT 105 (H) 06/23/2021 01:29 PM    TBILIRUBIN 0.4 06/23/2021 01:29 PM    ALBUMIN 4.4 06/23/2021 01:29 PM    TOTPROTEIN 7.3 06/23/2021 01:29 PM     Lab Results   Component Value Date/Time    RHEUMFACTN 8.8 03/15/2013 12:30 PM     Lab Results   Component Value Date/Time    ANADIRECT Negative 03/15/2013 12:30 PM     Lab Results   Component Value Date/Time    SEDRATEWES 4 06/23/2021 01:29 PM       Assessment and Plan:     1. Psoriatic arthritis (HCC)  Doing quite well on Cosentyx 150 mg subcu every 4 weeks, patient would like to increase the length of time between injections to 5 weeks, patient will give that a try.  - Comp Metabolic Panel; Future  - CBC WITH DIFFERENTIAL; Future  - Sed Rate; Future  - HEP B CORE AB IGM; Future  - HEP B SURFACE ANTIGEN; Future  - HEP C VIRUS ANTIBODY; Future  - Quantiferon Gold Plus TB (4 tube); Future  - Secukinumab 150 MG/ML Solution Auto-injector; 150 mg sq every 5 weeks  Dispense: 3 Each; Refill: 1    2. Medication monitoring encounter  Currently on Cosentyx 150 mg subcu every 4 weeks, we will increase to 150 mg every 5 weeks, screening labs are due, screening labs ordered for patient patient also needs monitoring labs, monitoring labs ordered for patient  We reviewed risks of biological medications with patient including hematological pathology, cancer risks, neurological and infection issues especially in the Covid-19 pandemic environment, patient states understanding.  - Comp Metabolic Panel; Future  - CBC WITH DIFFERENTIAL; Future  - Sed Rate; Future  - HEP B CORE AB IGM; Future  - HEP B SURFACE ANTIGEN; Future  - HEP C VIRUS ANTIBODY; Future  - Quantiferon Gold Plus TB (4 tube); Future  - Secukinumab 150 MG/ML Solution Auto-injector; 150 mg sq  every 5 weeks  Dispense: 3 Each; Refill: 1    3. Fatty liver  May impact the type of medications we can use for this patient's arthritis. We will have to keep this under advisement.  - Comp Metabolic Panel; Future  - CBC WITH DIFFERENTIAL; Future  - Sed Rate; Future  - HEP B CORE AB IGM; Future  - HEP B SURFACE ANTIGEN; Future  - HEP C VIRUS ANTIBODY; Future  - Quantiferon Gold Plus TB (4 tube); Future    4. Essential hypertension  May impact the type of medications we can use for this patient's arthritis. We will have to keep this under advisement.  - Comp Metabolic Panel; Future  - CBC WITH DIFFERENTIAL; Future  - Sed Rate; Future  - HEP B CORE AB IGM; Future  - HEP B SURFACE ANTIGEN; Future  - HEP C VIRUS ANTIBODY; Future  - Quantiferon Gold Plus TB (4 tube); Future    Followup: Return in about 4 months (around 1/19/2023). or sooner abdoul aGrcia  was seen 30 minutes face-to-face of which more than 50% of the time was spent counseling the patient (excluding time for procedures)  regarding  rheumatological condition and care. Therapy was discussed in detail.      Please note that this dictation was created using voice recognition software. I have made every reasonable attempt to correct obvious errors, but I expect that there are errors of grammar and possibly content that I did not discover before finalizing the note.

## 2022-09-27 ENCOUNTER — TELEPHONE (OUTPATIENT)
Dept: RHEUMATOLOGY | Facility: MEDICAL CENTER | Age: 62
End: 2022-09-27
Payer: COMMERCIAL

## 2022-09-27 DIAGNOSIS — L40.50 PSORIATIC ARTHRITIS (HCC): ICD-10-CM

## 2022-09-27 DIAGNOSIS — Z51.81 MEDICATION MONITORING ENCOUNTER: ICD-10-CM

## 2022-09-27 NOTE — TELEPHONE ENCOUNTER
Please notify patient that we received notification from his insurance that they will not cover the Cosentyx, they want patient to do a trial of alternative treatment we will do a trial of Taltz which is very similar to Cosentyx, Taltz has been called into [facility],    Dosing will be Taltz 2 injections for the very first dose and then 1 injection every 4 weeks thereafter.    If patient has any questions, please schedule a f/u appointment

## 2022-09-29 ENCOUNTER — APPOINTMENT (RX ONLY)
Dept: URBAN - METROPOLITAN AREA CLINIC 6 | Facility: CLINIC | Age: 62
Setting detail: DERMATOLOGY
End: 2022-09-29

## 2022-09-29 DIAGNOSIS — L82.1 OTHER SEBORRHEIC KERATOSIS: ICD-10-CM

## 2022-09-29 DIAGNOSIS — Z85.820 PERSONAL HISTORY OF MALIGNANT MELANOMA OF SKIN: ICD-10-CM

## 2022-09-29 DIAGNOSIS — Z71.89 OTHER SPECIFIED COUNSELING: ICD-10-CM

## 2022-09-29 DIAGNOSIS — L81.4 OTHER MELANIN HYPERPIGMENTATION: ICD-10-CM

## 2022-09-29 DIAGNOSIS — D22 MELANOCYTIC NEVI: ICD-10-CM

## 2022-09-29 DIAGNOSIS — D18.0 HEMANGIOMA: ICD-10-CM

## 2022-09-29 PROBLEM — D22.62 MELANOCYTIC NEVI OF LEFT UPPER LIMB, INCLUDING SHOULDER: Status: ACTIVE | Noted: 2022-09-29

## 2022-09-29 PROBLEM — D22.5 MELANOCYTIC NEVI OF TRUNK: Status: ACTIVE | Noted: 2022-09-29

## 2022-09-29 PROBLEM — D22.72 MELANOCYTIC NEVI OF LEFT LOWER LIMB, INCLUDING HIP: Status: ACTIVE | Noted: 2022-09-29

## 2022-09-29 PROBLEM — D18.01 HEMANGIOMA OF SKIN AND SUBCUTANEOUS TISSUE: Status: ACTIVE | Noted: 2022-09-29

## 2022-09-29 PROBLEM — D22.71 MELANOCYTIC NEVI OF RIGHT LOWER LIMB, INCLUDING HIP: Status: ACTIVE | Noted: 2022-09-29

## 2022-09-29 PROBLEM — D22.61 MELANOCYTIC NEVI OF RIGHT UPPER LIMB, INCLUDING SHOULDER: Status: ACTIVE | Noted: 2022-09-29

## 2022-09-29 PROBLEM — D48.5 NEOPLASM OF UNCERTAIN BEHAVIOR OF SKIN: Status: ACTIVE | Noted: 2022-09-29

## 2022-09-29 PROCEDURE — 99213 OFFICE O/P EST LOW 20 MIN: CPT | Mod: 25

## 2022-09-29 PROCEDURE — 11102 TANGNTL BX SKIN SINGLE LES: CPT

## 2022-09-29 PROCEDURE — ? COUNSELING

## 2022-09-29 PROCEDURE — ? BIOPSY BY SHAVE METHOD

## 2022-09-29 ASSESSMENT — LOCATION ZONE DERM
LOCATION ZONE: LEG
LOCATION ZONE: ARM
LOCATION ZONE: TRUNK
LOCATION ZONE: FACE

## 2022-09-29 ASSESSMENT — LOCATION SIMPLE DESCRIPTION DERM
LOCATION SIMPLE: LEFT CALF
LOCATION SIMPLE: RIGHT FOREARM
LOCATION SIMPLE: LEFT FOREARM
LOCATION SIMPLE: LEFT UPPER BACK
LOCATION SIMPLE: RIGHT POSTERIOR THIGH
LOCATION SIMPLE: LEFT CHEEK
LOCATION SIMPLE: RIGHT LOWER BACK
LOCATION SIMPLE: LEFT UPPER ARM
LOCATION SIMPLE: RIGHT UPPER ARM
LOCATION SIMPLE: LEFT SHOULDER
LOCATION SIMPLE: LEFT THIGH
LOCATION SIMPLE: RIGHT UPPER BACK
LOCATION SIMPLE: CHEST
LOCATION SIMPLE: RIGHT THIGH
LOCATION SIMPLE: RIGHT CALF
LOCATION SIMPLE: LEFT POSTERIOR THIGH

## 2022-09-29 ASSESSMENT — LOCATION DETAILED DESCRIPTION DERM
LOCATION DETAILED: RIGHT LATERAL UPPER BACK
LOCATION DETAILED: LEFT ANTERIOR PROXIMAL THIGH
LOCATION DETAILED: RIGHT VENTRAL DISTAL FOREARM
LOCATION DETAILED: RIGHT ANTERIOR PROXIMAL THIGH
LOCATION DETAILED: LEFT ANTERIOR DISTAL UPPER ARM
LOCATION DETAILED: LEFT PROXIMAL DORSAL FOREARM
LOCATION DETAILED: RIGHT PROXIMAL DORSAL FOREARM
LOCATION DETAILED: RIGHT SUPERIOR LATERAL MIDBACK
LOCATION DETAILED: RIGHT DISTAL POSTERIOR THIGH
LOCATION DETAILED: RIGHT MEDIAL INFERIOR CHEST
LOCATION DETAILED: RIGHT ANTERIOR DISTAL UPPER ARM
LOCATION DETAILED: RIGHT MID-UPPER BACK
LOCATION DETAILED: LEFT CENTRAL MALAR CHEEK
LOCATION DETAILED: RIGHT PROXIMAL CALF
LOCATION DETAILED: LEFT PROXIMAL CALF
LOCATION DETAILED: RIGHT VENTRAL PROXIMAL FOREARM
LOCATION DETAILED: LEFT INFERIOR UPPER BACK
LOCATION DETAILED: LEFT ANTERIOR SHOULDER
LOCATION DETAILED: LEFT DISTAL POSTERIOR THIGH
LOCATION DETAILED: LEFT VENTRAL DISTAL FOREARM

## 2022-10-05 ENCOUNTER — TELEPHONE (OUTPATIENT)
Dept: RHEUMATOLOGY | Facility: MEDICAL CENTER | Age: 62
End: 2022-10-05
Payer: COMMERCIAL

## 2022-10-05 NOTE — TELEPHONE ENCOUNTER
Received request for PA for Taltz 80mg/mL.  Submitted PA over phone and got an approval through 04/03/2023 but only for 1 pen/28day.    For second pen insurance provided a phone number to [employer] @ [phone] where they will provide the 2nd pen for the loading dose, called and was informed patient must call to set up.    Called and left a V/M with patient.

## 2022-10-13 NOTE — TELEPHONE ENCOUNTER
Spoke with patient and he confirmed that he has received his medication today, He was able to get the 2 pens for his initial dose.

## 2022-11-01 NOTE — TELEPHONE ENCOUNTER
Brief Postoperative Note      Patient: Keyla Sue  YOB: 1982  MRN: [id number]    Date of Procedure: 11/1/2022    Pre-Op Diagnosis: OPEN FRACTURE LEFT WRIST    Post-Op Diagnosis: Same       Procedure(s):  DEBRIDEMENT OF LEFT WRIST OPEN FRACTURE WITH MINI SEBAS    Surgeon(s):  Sandro Luna MD    Assistant: Annie Flowers CNP    Anesthesia: General    Estimated Blood Loss (mL): Minimal    Complications: None    Specimens:   * No specimens in log *    Implants:  * No implants in log *      Drains: * No LDAs found *    Findings: Same.     Electronically signed by Sandro Luna MD on 11/1/2022 at 1:16 PM LVM to return my call.  Thank you

## 2023-02-02 ENCOUNTER — OFFICE VISIT (OUTPATIENT)
Dept: RHEUMATOLOGY | Facility: MEDICAL CENTER | Age: 63
End: 2023-02-02
Attending: INTERNAL MEDICINE
Payer: COMMERCIAL

## 2023-02-02 VITALS
SYSTOLIC BLOOD PRESSURE: 122 MMHG | BODY MASS INDEX: 32.08 KG/M2 | WEIGHT: 230 LBS | RESPIRATION RATE: 14 BRPM | DIASTOLIC BLOOD PRESSURE: 70 MMHG | HEART RATE: 105 BPM | OXYGEN SATURATION: 95 % | TEMPERATURE: 97.7 F

## 2023-02-02 DIAGNOSIS — K76.0 FATTY LIVER: ICD-10-CM

## 2023-02-02 DIAGNOSIS — Z51.81 MEDICATION MONITORING ENCOUNTER: ICD-10-CM

## 2023-02-02 DIAGNOSIS — L40.50 PSORIATIC ARTHRITIS (HCC): ICD-10-CM

## 2023-02-02 DIAGNOSIS — I10 ESSENTIAL HYPERTENSION: ICD-10-CM

## 2023-02-02 PROCEDURE — 99214 OFFICE O/P EST MOD 30 MIN: CPT | Performed by: INTERNAL MEDICINE

## 2023-02-02 PROCEDURE — 99212 OFFICE O/P EST SF 10 MIN: CPT | Performed by: INTERNAL MEDICINE

## 2023-02-02 ASSESSMENT — FIBROSIS 4 INDEX: FIB4 SCORE: 1.93

## 2023-02-02 NOTE — PROGRESS NOTES
Chief Complaint- joint pain     Subjective:   Evens Garcia is a 62 y.o. male here today for follow up of rheumatological issues    This is a follow-up visit for this patient who was seen in this clinic for psoriatic arthritis.  Patient is currently on Taltz 80 mg subcu every 4 weeks with great benefit.  Patient denies any side effects from the medication, denies any unexplained weight loss, denies any fevers of unknown etiology, denies any GI upset, denies any rashes, denies any new joint swelling, denies recurrent infections.  Of note recent sedimentation rate equals 9 January 2023.     Comorbidities include elevated liver functions status post liver ultrasound indicating fatty liver with all other negative serologies.      Additional comorbidity includes chronic left knee pain status post skiing accident with a meniscal injury.  Patient also history of melanoma resection.        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    Addendum 3/16/2023  Quantiferon Gold neg 3/2023     U-1 neg 2/2020 [facility]  U-2 neg 2/2020 [facility]  Mi-2 neg 2/2020 [facility]  Ku neg2/2020 [facility]  SRP neg 2/2020 [facility]  PL-7 neg 2/2020 [facility]  PL-12 neg 2/2020 [facility]  E-J neg 2/2020 [facility]  O-J neg 2/2020 [facility]  RO-52 pos 2/2020 [facility]  PM-Scl neg 2/2020 [facility]  SELENA-1 neg 2/2020 [facility]  Anti p155/140 (TIF-1) neg 2/2020 [facility]  MDA5 (CADM 140) neg 2/2020 [facility]  SAE1 neg 2/2020 [facility]  U3 RNP neg 2/2020 [facility]  Anti CN1A ab neg 3/2020     HBsAg/HBcAb IgM neg 7/2020 [facility]  HCV neg 7/2020 [facility]  Quantiferon Gold neg 7/2020 [facility]   Anti-actin ab neg 7/2018-[facility]  Mitochondrial ab neg 7/2018 [facility]  CCP neg 1/2018 [facility]  RF neg 3/2013  TINA neg 3/2013  Uric acid 4.9 1/2018 [facility]     Feet x-rays 1/2017-indicates erosive arthritis on the left  fifth MTP joint  Hand x-rays 1/2017-indicates DJD  SI joint x-rays 1/2017-no pathology  Liver ultrasound 7/2018-indicates fatty liver        Current Outpatient Medications   Medication Sig Dispense Refill    Ixekizumab 80 MG/ML Solution Auto-injector 160 mg SQ first dose then 80 mg sq every 4 weeks 13 mL 1    Multiple Vitamins-Minerals (OCUVITE ADULT 50+ PO) Take  by mouth.      VITAMIN D PO Take  by mouth.      Ascorbic Acid (VITAMIN C PO) Take  by mouth.      VITAMIN E PO Take  by mouth.      ibuprofen (MOTRIN) 200 MG TABS Take 200 mg by mouth every 6 hours as needed. Takes two prn        No current facility-administered medications for this visit.     He  has no past medical history on file.    ROS   Other than what is mentioned in HPI or physical exam, there is no history of headaches, double vision or blurred vision. No temporal tenderness or jaw claudication. No trouble swallowing difficulties or sore throats.  No chest complaints including chest pain, cough or sputum production. No GI complaints including nausea, vomiting, change in bowel habits, or past peptic ulcer disease. No history of blood in the stools. No urinary complaints including dysuria or frequency. No history of alopecia, photosensitivity, oral ulcerations, Raynaud's phenomena.       Objective:     /70   Pulse (!) 105   Temp 36.5 °C (97.7 °F) (Temporal)   Resp 14   Wt 104 kg (230 lb)   SpO2 95%  Body mass index is 32.08 kg/m².   Physical Exam:    Constitutional: Alert and oriented X3, patient is talkative with good eye contact.Skin: Warm, dry flaky skin but no marsha psoriatic plaques eye: Equal, round and reactive, conjunctiva clear, lids normal EOM intactENMT: Lips without lesions, good dentition, no oropharyngeal ulcers, moist buccal mucosa, pinna without deformityNeck: Trachea midline, no masses, no thyromegaly.Lymph:  No cervical lymphadenopathy, no axillary lymphadenopathy, no supraclavicular lymphadenopathyRespiratory:  Unlabored respiratory effort, lungs clear to auscultation, no wheezes, no ronchi.Cardiovascular: Normal S1, S2, Regular rate and rhythm, no murmurs rubs or gallops  .Abdomen: Soft, non-tender, no masses, no hepatosplenomegaly.Psych: Alert and oriented x3, normal affect and mood.Neuro: Cranial nerves 2-12 are grossly intact, no loss of sensation LEExt:no joint laxity noted in bilateral arms, no joint laxity noted in bilateral legs, joints look good no swan-neck or boutonniere deformities no sausage digits no dactylitis, no sausage does, mild crepitus in knees but no synovitis, shoulders full range of motion without limitations, gait without antalgia and without foot drop    Lab Results   Component Value Date/Time    HEPBCORIGM Non-Reactive 07/27/2020 06:58 AM    HEPBSAG Non-Reactive 07/27/2020 06:58 AM     Lab Results   Component Value Date/Time    HEPCAB Non-Reactive 07/27/2020 06:58 AM     Lab Results   Component Value Date/Time    SODIUM 143 06/23/2021 01:29 PM    POTASSIUM 4.3 06/23/2021 01:29 PM    CHLORIDE 108 06/23/2021 01:29 PM    CO2 24 06/23/2021 01:29 PM    GLUCOSE 103 (H) 06/23/2021 01:29 PM    BUN 18 06/23/2021 01:29 PM    CREATININE 1.04 06/23/2021 01:29 PM    CREATININE 1.00 03/15/2013 12:30 PM    BUNCREATRAT 22 (H) 03/15/2013 12:30 PM    GLOMRATE >59 11/19/2010 08:43 AM      Lab Results   Component Value Date/Time    WBC 7.3 06/23/2021 01:29 PM    WBC 9.2 03/15/2013 12:30 PM    RBC 5.71 06/23/2021 01:29 PM    RBC 5.33 03/15/2013 12:30 PM    HEMOGLOBIN 17.3 06/23/2021 01:29 PM    HEMATOCRIT 52.3 (H) 06/23/2021 01:29 PM    MCV 91.6 06/23/2021 01:29 PM    MCV 83 03/15/2013 12:30 PM    MCH 30.3 06/23/2021 01:29 PM    MCH 28.3 03/15/2013 12:30 PM    MCHC 33.1 (L) 06/23/2021 01:29 PM    MPV 10.6 06/23/2021 01:29 PM    NEUTSPOLYS 59.10 06/23/2021 01:29 PM    LYMPHOCYTES 27.50 06/23/2021 01:29 PM    MONOCYTES 9.30 06/23/2021 01:29 PM    EOSINOPHILS 3.10 06/23/2021 01:29 PM    BASOPHILS 0.70 06/23/2021 01:29  PM      Lab Results   Component Value Date/Time    CALCIUM 9.1 06/23/2021 01:29 PM    ASTSGOT 43 06/23/2021 01:29 PM    ALTSGPT 56 (H) 06/23/2021 01:29 PM    ALKPHOSPHAT 105 (H) 06/23/2021 01:29 PM    TBILIRUBIN 0.4 06/23/2021 01:29 PM    ALBUMIN 4.4 06/23/2021 01:29 PM    TOTPROTEIN 7.3 06/23/2021 01:29 PM     Lab Results   Component Value Date/Time    RHEUMFACTN 8.8 03/15/2013 12:30 PM     Lab Results   Component Value Date/Time    ANADIRECT Negative 03/15/2013 12:30 PM     Lab Results   Component Value Date/Time    SEDRATEWES 4 06/23/2021 01:29 PM     Lab Results   Component Value Date/Time    CPKTOTAL 582 (H) 07/27/2020 06:58 AM     Assessment and Plan:     1. Psoriatic arthritis (HCC)  Continue Taltz 80 mg subcu every 4 weeks    2. Medication monitoring encounter  Currently on Taltz 80 mg subcu every 4 weeks, patient overdue for screening labs, screening labs were ordered September 2022, we reprinted those orders for patient to do now, patient does need monitoring labs every 6 months, next labs due about July 2023, will order at next visit  We reviewed risks of biological medications with patient including hematological pathology, cancer risks, neurological and infection issues especially in the Covid-19 pandemic environment, patient states understanding.    3. Fatty liver  May impact the type of medications we can use for this patient's arthritis. We will have to keep this under advisement.    4. Essential hypertension  May impact the type of medications we can use for this patient's arthritis. We will have to keep this under advisement.    Followup: Return in about 6 months (around 8/2/2023). or sooner abdoul Garcia  was seen 30 minutes face-to-face of which more than 50% of the time was spent counseling the patient (excluding time for procedures)  regarding  rheumatological condition and care. Therapy was discussed in detail.      Please note that this dictation was created using voice  recognition software. I have made every reasonable attempt to correct obvious errors, but I expect that there are errors of grammar and possibly content that I did not discover before finalizing the note.

## 2023-03-13 ENCOUNTER — HOSPITAL ENCOUNTER (OUTPATIENT)
Dept: LAB | Facility: MEDICAL CENTER | Age: 63
End: 2023-03-13
Attending: INTERNAL MEDICINE
Payer: COMMERCIAL

## 2023-03-13 DIAGNOSIS — L40.50 PSORIATIC ARTHRITIS (HCC): ICD-10-CM

## 2023-03-13 DIAGNOSIS — I10 ESSENTIAL HYPERTENSION: ICD-10-CM

## 2023-03-13 DIAGNOSIS — Z51.81 MEDICATION MONITORING ENCOUNTER: ICD-10-CM

## 2023-03-13 DIAGNOSIS — K76.0 FATTY LIVER: ICD-10-CM

## 2023-03-13 LAB
ALBUMIN SERPL BCP-MCNC: 4.3 G/DL (ref 3.2–4.9)
ALBUMIN/GLOB SERPL: 1.6 G/DL
ALP SERPL-CCNC: 106 U/L (ref 30–99)
ALT SERPL-CCNC: 55 U/L (ref 2–50)
ANION GAP SERPL CALC-SCNC: 11 MMOL/L (ref 7–16)
AST SERPL-CCNC: 40 U/L (ref 12–45)
BASOPHILS # BLD AUTO: 0.9 % (ref 0–1.8)
BASOPHILS # BLD: 0.05 K/UL (ref 0–0.12)
BILIRUB SERPL-MCNC: 0.4 MG/DL (ref 0.1–1.5)
BUN SERPL-MCNC: 20 MG/DL (ref 8–22)
CALCIUM ALBUM COR SERPL-MCNC: 8.7 MG/DL (ref 8.5–10.5)
CALCIUM SERPL-MCNC: 8.9 MG/DL (ref 8.5–10.5)
CHLORIDE SERPL-SCNC: 105 MMOL/L (ref 96–112)
CO2 SERPL-SCNC: 22 MMOL/L (ref 20–33)
CREAT SERPL-MCNC: 1.09 MG/DL (ref 0.5–1.4)
EOSINOPHIL # BLD AUTO: 0.11 K/UL (ref 0–0.51)
EOSINOPHIL NFR BLD: 2 % (ref 0–6.9)
ERYTHROCYTE [DISTWIDTH] IN BLOOD BY AUTOMATED COUNT: 42.2 FL (ref 35.9–50)
ERYTHROCYTE [SEDIMENTATION RATE] IN BLOOD BY WESTERGREN METHOD: 2 MM/HOUR (ref 0–20)
GFR SERPLBLD CREATININE-BSD FMLA CKD-EPI: 76 ML/MIN/1.73 M 2
GLOBULIN SER CALC-MCNC: 2.7 G/DL (ref 1.9–3.5)
GLUCOSE SERPL-MCNC: 103 MG/DL (ref 65–99)
HBV CORE IGM SER QL: NORMAL
HBV SURFACE AG SER QL: NORMAL
HCT VFR BLD AUTO: 51.6 % (ref 42–52)
HCV AB SER QL: NORMAL
HGB BLD-MCNC: 17 G/DL (ref 14–18)
IMM GRANULOCYTES # BLD AUTO: 0.02 K/UL (ref 0–0.11)
IMM GRANULOCYTES NFR BLD AUTO: 0.4 % (ref 0–0.9)
LYMPHOCYTES # BLD AUTO: 1.53 K/UL (ref 1–4.8)
LYMPHOCYTES NFR BLD: 27.4 % (ref 22–41)
MCH RBC QN AUTO: 30 PG (ref 27–33)
MCHC RBC AUTO-ENTMCNC: 32.9 G/DL (ref 33.7–35.3)
MCV RBC AUTO: 91 FL (ref 81.4–97.8)
MONOCYTES # BLD AUTO: 0.47 K/UL (ref 0–0.85)
MONOCYTES NFR BLD AUTO: 8.4 % (ref 0–13.4)
NEUTROPHILS # BLD AUTO: 3.41 K/UL (ref 1.82–7.42)
NEUTROPHILS NFR BLD: 60.9 % (ref 44–72)
NRBC # BLD AUTO: 0 K/UL
NRBC BLD-RTO: 0 /100 WBC
PLATELET # BLD AUTO: 184 K/UL (ref 164–446)
PMV BLD AUTO: 10.2 FL (ref 9–12.9)
POTASSIUM SERPL-SCNC: 4.2 MMOL/L (ref 3.6–5.5)
PROT SERPL-MCNC: 7 G/DL (ref 6–8.2)
RBC # BLD AUTO: 5.67 M/UL (ref 4.7–6.1)
SODIUM SERPL-SCNC: 138 MMOL/L (ref 135–145)
WBC # BLD AUTO: 5.6 K/UL (ref 4.8–10.8)

## 2023-03-13 PROCEDURE — 87340 HEPATITIS B SURFACE AG IA: CPT

## 2023-03-13 PROCEDURE — 80053 COMPREHEN METABOLIC PANEL: CPT

## 2023-03-13 PROCEDURE — 85025 COMPLETE CBC W/AUTO DIFF WBC: CPT

## 2023-03-13 PROCEDURE — 86705 HEP B CORE ANTIBODY IGM: CPT

## 2023-03-13 PROCEDURE — 86803 HEPATITIS C AB TEST: CPT

## 2023-03-13 PROCEDURE — 85652 RBC SED RATE AUTOMATED: CPT

## 2023-03-13 PROCEDURE — 86480 TB TEST CELL IMMUN MEASURE: CPT

## 2023-03-13 PROCEDURE — 36415 COLL VENOUS BLD VENIPUNCTURE: CPT

## 2023-03-14 LAB
GAMMA INTERFERON BACKGROUND BLD IA-ACNC: 0.03 IU/ML
M TB IFN-G BLD-IMP: NEGATIVE
M TB IFN-G CD4+ BCKGRND COR BLD-ACNC: 0 IU/ML
MITOGEN IGNF BCKGRD COR BLD-ACNC: >10 IU/ML
QFT TB2 - NIL TBQ2: 0.01 IU/ML

## 2023-03-30 ENCOUNTER — APPOINTMENT (RX ONLY)
Dept: URBAN - METROPOLITAN AREA CLINIC 6 | Facility: CLINIC | Age: 63
Setting detail: DERMATOLOGY
End: 2023-03-30

## 2023-03-30 DIAGNOSIS — Z71.89 OTHER SPECIFIED COUNSELING: ICD-10-CM

## 2023-03-30 DIAGNOSIS — L73.8 OTHER SPECIFIED FOLLICULAR DISORDERS: ICD-10-CM

## 2023-03-30 DIAGNOSIS — L81.4 OTHER MELANIN HYPERPIGMENTATION: ICD-10-CM

## 2023-03-30 DIAGNOSIS — L72.8 OTHER FOLLICULAR CYSTS OF THE SKIN AND SUBCUTANEOUS TISSUE: ICD-10-CM

## 2023-03-30 DIAGNOSIS — Z85.820 PERSONAL HISTORY OF MALIGNANT MELANOMA OF SKIN: ICD-10-CM

## 2023-03-30 DIAGNOSIS — D22 MELANOCYTIC NEVI: ICD-10-CM

## 2023-03-30 DIAGNOSIS — L82.1 OTHER SEBORRHEIC KERATOSIS: ICD-10-CM

## 2023-03-30 DIAGNOSIS — L91.8 OTHER HYPERTROPHIC DISORDERS OF THE SKIN: ICD-10-CM

## 2023-03-30 DIAGNOSIS — D18.0 HEMANGIOMA: ICD-10-CM

## 2023-03-30 PROBLEM — D22.61 MELANOCYTIC NEVI OF RIGHT UPPER LIMB, INCLUDING SHOULDER: Status: ACTIVE | Noted: 2023-03-30

## 2023-03-30 PROBLEM — D18.01 HEMANGIOMA OF SKIN AND SUBCUTANEOUS TISSUE: Status: ACTIVE | Noted: 2023-03-30

## 2023-03-30 PROBLEM — D22.71 MELANOCYTIC NEVI OF RIGHT LOWER LIMB, INCLUDING HIP: Status: ACTIVE | Noted: 2023-03-30

## 2023-03-30 PROBLEM — D22.5 MELANOCYTIC NEVI OF TRUNK: Status: ACTIVE | Noted: 2023-03-30

## 2023-03-30 PROBLEM — D22.72 MELANOCYTIC NEVI OF LEFT LOWER LIMB, INCLUDING HIP: Status: ACTIVE | Noted: 2023-03-30

## 2023-03-30 PROBLEM — D22.62 MELANOCYTIC NEVI OF LEFT UPPER LIMB, INCLUDING SHOULDER: Status: ACTIVE | Noted: 2023-03-30

## 2023-03-30 PROCEDURE — 99213 OFFICE O/P EST LOW 20 MIN: CPT

## 2023-03-30 PROCEDURE — ? DIAGNOSIS COMMENT

## 2023-03-30 PROCEDURE — ? COUNSELING

## 2023-03-30 ASSESSMENT — LOCATION DETAILED DESCRIPTION DERM
LOCATION DETAILED: LEFT PROXIMAL CALF
LOCATION DETAILED: RIGHT PROXIMAL DORSAL FOREARM
LOCATION DETAILED: LEFT ANTERIOR DISTAL UPPER ARM
LOCATION DETAILED: LEFT INFERIOR LATERAL NECK
LOCATION DETAILED: RIGHT SUPERIOR LATERAL MIDBACK
LOCATION DETAILED: RIGHT PROXIMAL CALF
LOCATION DETAILED: INFERIOR THORACIC SPINE
LOCATION DETAILED: RIGHT MID-UPPER BACK
LOCATION DETAILED: LEFT PROXIMAL DORSAL FOREARM
LOCATION DETAILED: LEFT INFERIOR CENTRAL MALAR CHEEK
LOCATION DETAILED: LEFT CLAVICULAR NECK
LOCATION DETAILED: RIGHT ANTERIOR PROXIMAL THIGH
LOCATION DETAILED: LEFT CENTRAL MALAR CHEEK
LOCATION DETAILED: LEFT ANTERIOR PROXIMAL THIGH
LOCATION DETAILED: LEFT INFERIOR UPPER BACK
LOCATION DETAILED: LEFT VENTRAL DISTAL FOREARM
LOCATION DETAILED: LEFT DISTAL POSTERIOR THIGH
LOCATION DETAILED: RIGHT DISTAL POSTERIOR THIGH
LOCATION DETAILED: RIGHT ANTERIOR DISTAL UPPER ARM
LOCATION DETAILED: RIGHT VENTRAL DISTAL FOREARM
LOCATION DETAILED: LEFT ANTERIOR SHOULDER

## 2023-03-30 ASSESSMENT — LOCATION SIMPLE DESCRIPTION DERM
LOCATION SIMPLE: LEFT FOREARM
LOCATION SIMPLE: RIGHT UPPER BACK
LOCATION SIMPLE: LEFT UPPER BACK
LOCATION SIMPLE: RIGHT THIGH
LOCATION SIMPLE: UPPER BACK
LOCATION SIMPLE: LEFT SHOULDER
LOCATION SIMPLE: LEFT UPPER ARM
LOCATION SIMPLE: LEFT CHEEK
LOCATION SIMPLE: RIGHT LOWER BACK
LOCATION SIMPLE: LEFT POSTERIOR THIGH
LOCATION SIMPLE: LEFT CALF
LOCATION SIMPLE: LEFT THIGH
LOCATION SIMPLE: RIGHT POSTERIOR THIGH
LOCATION SIMPLE: RIGHT FOREARM
LOCATION SIMPLE: RIGHT CALF
LOCATION SIMPLE: LEFT ANTERIOR NECK
LOCATION SIMPLE: RIGHT UPPER ARM

## 2023-03-30 ASSESSMENT — LOCATION ZONE DERM
LOCATION ZONE: LEG
LOCATION ZONE: FACE
LOCATION ZONE: ARM
LOCATION ZONE: NECK
LOCATION ZONE: TRUNK

## 2023-08-07 ENCOUNTER — OFFICE VISIT (OUTPATIENT)
Dept: RHEUMATOLOGY | Facility: MEDICAL CENTER | Age: 63
End: 2023-08-07
Attending: INTERNAL MEDICINE
Payer: COMMERCIAL

## 2023-08-07 VITALS
WEIGHT: 227 LBS | OXYGEN SATURATION: 95 % | RESPIRATION RATE: 14 BRPM | BODY MASS INDEX: 31.66 KG/M2 | DIASTOLIC BLOOD PRESSURE: 70 MMHG | TEMPERATURE: 97.6 F | HEART RATE: 85 BPM | SYSTOLIC BLOOD PRESSURE: 120 MMHG

## 2023-08-07 DIAGNOSIS — I10 ESSENTIAL HYPERTENSION: ICD-10-CM

## 2023-08-07 DIAGNOSIS — Z51.81 MEDICATION MONITORING ENCOUNTER: ICD-10-CM

## 2023-08-07 DIAGNOSIS — K76.0 FATTY LIVER: ICD-10-CM

## 2023-08-07 DIAGNOSIS — L40.50 PSORIATIC ARTHRITIS (HCC): ICD-10-CM

## 2023-08-07 PROCEDURE — 3078F DIAST BP <80 MM HG: CPT | Performed by: INTERNAL MEDICINE

## 2023-08-07 PROCEDURE — 99211 OFF/OP EST MAY X REQ PHY/QHP: CPT | Performed by: INTERNAL MEDICINE

## 2023-08-07 PROCEDURE — 3074F SYST BP LT 130 MM HG: CPT | Performed by: INTERNAL MEDICINE

## 2023-08-07 PROCEDURE — 99214 OFFICE O/P EST MOD 30 MIN: CPT | Performed by: INTERNAL MEDICINE

## 2023-08-07 ASSESSMENT — FIBROSIS 4 INDEX: FIB4 SCORE: 1.85

## 2023-08-07 ASSESSMENT — PATIENT HEALTH QUESTIONNAIRE - PHQ9: CLINICAL INTERPRETATION OF PHQ2 SCORE: 0

## 2023-08-07 NOTE — PROGRESS NOTES
Chief Complaint- joint pain     Subjective:   Evens Garcia is a 63 y.o. male here today for follow up of rheumatological issues    This is a follow-up visit for this patient who we see in this clinic for psoriatic arthritis.  Patient is currently on Taltz 80 mg subcu every 4 weeks with great benefit.  Patient denies any side effects from the medication, denies any unexplained weight loss, denies any fevers of unknown etiology, denies any GI upset, denies any rashes, denies any new joint swelling, denies recurrent infections.   Of note recent sedimentation rate equals 2 March 2023.     Comorbidities include elevated liver functions status post liver ultrasound indicating fatty liver with all other negative serologies.      Additional comorbidity includes chronic left knee pain status post skiing accident with a meniscal injury.  Patient also history of melanoma resection.        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    Addendum 2/5/2024  Hemochromatosis genotype neg 2/2024        U-1 neg 2/2020 [facility]  U-2 neg 2/2020 [facility]  Mi-2 neg 2/2020 [facility]  Ku neg2/2020 [facility]  SRP neg 2/2020 [facility]  PL-7 neg 2/2020 [facility]  PL-12 neg 2/2020 [facility]  E-J neg 2/2020 [facility]  O-J neg 2/2020 [facility]  RO-52 pos 2/2020 [facility]  PM-Scl neg 2/2020 [facility]  SELENA-1 neg 2/2020 [facility]  Anti p155/140 (TIF-1) neg 2/2020 [facility]  MDA5 (CADM 140) neg 2/2020 [facility]  SAE1 neg 2/2020 [facility]  U3 RNP neg 2/2020 [facility]  Anti CN1A ab neg 3/2020     HBsAg/HBcAb IgM neg 3/2023  HCV neg 3/2023  Quantiferon Gold neg 3/2023  Anti-actin ab neg 7/2018[facility]  Mitochondrial ab neg 7/2018 [facility]  CCP neg 1/2018 [facility]  RF neg 3/2013  TINA neg 3/2013  Uric acid 4.9 1/2018 [facility]     Feet x-rays 1/2017-indicates erosive arthritis on the left fifth MTP  joint  Hand x-rays 1/2017-indicates DJD  SI joint x-rays 1/2017-no pathology  Liver ultrasound 7/2018-indicates fatty liver    Addendum 10/5/2023  Liver Ultrasound 10/2023-IMPRESSION:  1.  Hepatic steatosis with focal fatty sparing near gallbladder fossa.        Current Outpatient Medications   Medication Sig Dispense Refill    Ixekizumab 80 MG/ML Solution Auto-injector 160 mg SQ first dose then 80 mg sq every 4 weeks 13 mL 1    Multiple Vitamins-Minerals (OCUVITE ADULT 50+ PO) Take  by mouth.      VITAMIN D PO Take  by mouth.      Ascorbic Acid (VITAMIN C PO) Take  by mouth.      VITAMIN E PO Take  by mouth.      ibuprofen (MOTRIN) 200 MG TABS Take 200 mg by mouth every 6 hours as needed. Takes two prn        No current facility-administered medications for this visit.     He  has no past medical history on file.    ROS   Other than what is mentioned in HPI or physical exam, there is no history of headaches, double vision or blurred vision.  No trouble swallowing difficulties .  No chest complaints including chest pain, cough or sputum production. No GI complaints including nausea, vomiting, change in bowel habits, or past peptic ulcer disease. No history of blood in the stools. No urinary complaints including dysuria or frequency. No history of alopecia, photosensitivity     Objective:     /70   Pulse 85   Temp 36.4 °C (97.6 °F) (Temporal)   Resp 14   Wt 103 kg (227 lb)   SpO2 95%  Body mass index is 31.66 kg/m².   Physical Exam:    Constitutional: Alert and oriented X3, patient is talkative with good eye contact.Skin: Warm, dry, good turgor, areas of dry skin but no marsha psoriatic plaques eye: Equal, round and reactive, conjunctiva clear, lids normal EOM intactENMT: Lips without lesions,  pinna without deformityNeck: Trachea midline, no masses, no thyromegaly.Lymph:  No cervical lymphadenopathy, no axillary lymphadenopathy, no supraclavicular lymphadenopathyRespiratory: Unlabored respiratory effort,  lungs clear to auscultation, no wheezes, no ronchi.Cardiovascular: Normal S1, S2, Regular rate and rhythm, no murmurs rubs or gallops  .Abdomen: Soft, non-distended.Psych: Alert and oriented x3, normal affect and mood.Neuro: Cranial nerves 2-12 are grossly intact Ext:no joint laxity noted in bilateral arms, no joint laxity noted in bilateral legs, no swan-neck or boutonniere deformities, no enthesitis is noted, shoulders full range of motion without limitations, no sausage digits no dactylitis, gait without antalgia and without foot drop    Lab Results   Component Value Date/Time    HEPBCORIGM Non-Reactive 03/13/2023 10:58 AM    HEPBSAG Non-Reactive 03/13/2023 10:58 AM     Lab Results   Component Value Date/Time    HEPCAB Non-Reactive 03/13/2023 10:58 AM     Lab Results   Component Value Date/Time    SODIUM 138 03/13/2023 10:58 AM    POTASSIUM 4.2 03/13/2023 10:58 AM    CHLORIDE 105 03/13/2023 10:58 AM    CO2 22 03/13/2023 10:58 AM    GLUCOSE 103 (H) 03/13/2023 10:58 AM    BUN 20 03/13/2023 10:58 AM    CREATININE 1.09 03/13/2023 10:58 AM    CREATININE 1.00 03/15/2013 12:30 PM    BUNCREATRAT 22 (H) 03/15/2013 12:30 PM    GLOMRATE >59 11/19/2010 08:43 AM      Lab Results   Component Value Date/Time    WBC 5.6 03/13/2023 10:58 AM    WBC 9.2 03/15/2013 12:30 PM    RBC 5.67 03/13/2023 10:58 AM    RBC 5.33 03/15/2013 12:30 PM    HEMOGLOBIN 17.0 03/13/2023 10:58 AM    HEMATOCRIT 51.6 03/13/2023 10:58 AM    MCV 91.0 03/13/2023 10:58 AM    MCV 83 03/15/2013 12:30 PM    MCH 30.0 03/13/2023 10:58 AM    MCH 28.3 03/15/2013 12:30 PM    MCHC 32.9 (L) 03/13/2023 10:58 AM    MPV 10.2 03/13/2023 10:58 AM    NEUTSPOLYS 60.90 03/13/2023 10:58 AM    LYMPHOCYTES 27.40 03/13/2023 10:58 AM    MONOCYTES 8.40 03/13/2023 10:58 AM    EOSINOPHILS 2.00 03/13/2023 10:58 AM    BASOPHILS 0.90 03/13/2023 10:58 AM      Lab Results   Component Value Date/Time    CALCIUM 8.9 03/13/2023 10:58 AM    ASTSGOT 40 03/13/2023 10:58 AM    ALTSGPT 55 (H)  03/13/2023 10:58 AM    ALKPHOSPHAT 106 (H) 03/13/2023 10:58 AM    TBILIRUBIN 0.4 03/13/2023 10:58 AM    ALBUMIN 4.3 03/13/2023 10:58 AM    TOTPROTEIN 7.0 03/13/2023 10:58 AM     Lab Results   Component Value Date/Time    RHEUMFACTN 8.8 03/15/2013 12:30 PM     Lab Results   Component Value Date/Time    ANADIRECT Negative 03/15/2013 12:30 PM     Lab Results   Component Value Date/Time    SEDRATEWES 2 03/13/2023 10:58 AM     Lab Results   Component Value Date/Time    CPKTOTAL 582 (H) 07/27/2020 06:58 AM     Assessment and Plan:     1. Psoriatic arthritis (HCC)  Clinically doing quite well with Taltz 80 mg subcu every 4 weeks, we will continue as such.  - CBC WITH DIFFERENTIAL; Future  - Comp Metabolic Panel; Future  - Sed Rate; Future    2. Medication monitoring encounter  Currently on Taltz 80 mg subcu every 4 weeks, screening labs are up-to-date, next screening labs due March 2025, patient needs monitoring labs every 6 months, next labs due September 2023, labs ordered for patient  We reviewed risks of biological medications with patient including hematological pathology, cancer risks, neurological and infection issues especially in the Covid-19 pandemic environment, patient states understanding.  - CBC WITH DIFFERENTIAL; Future  - Comp Metabolic Panel; Future  - Sed Rate; Future    3. Fatty liver  May impact the type of medications we can use for this patient's arthritis. We will have to keep this under advisement.  - CBC WITH DIFFERENTIAL; Future  - Comp Metabolic Panel; Future  - Sed Rate; Future    4. Essential hypertension  May impact the type of medications we can use for this patient's arthritis. We will have to keep this under advisement.      Followup: Return in about 6 months (around 2/7/2024). or sooner abdoul Garcia  was seen 30 minutes face-to-face of which more than 50% of the time was spent counseling the patient (excluding time for procedures)  regarding  rheumatological condition  and care. Therapy was discussed in detail.      Please note that this dictation was created using voice recognition software. I have made every reasonable attempt to correct obvious errors, but I expect that there are errors of grammar and possibly content that I did not discover before finalizing the note.

## 2023-08-28 DIAGNOSIS — L40.50 PSORIATIC ARTHRITIS (HCC): ICD-10-CM

## 2023-08-28 DIAGNOSIS — Z51.81 MEDICATION MONITORING ENCOUNTER: ICD-10-CM

## 2023-08-28 NOTE — TELEPHONE ENCOUNTER
Received request via: Pharmacy March labs    Was the patient seen in the last year in this department? Yes    Does the patient have an active prescription (recently filled or refills available) for medication(s) requested? No    Does the patient have [facility] and need 100 day supply (blood pressure, diabetes and cholesterol meds only)? Medication is not for cholesterol, blood pressure or diabetes

## 2023-08-31 ENCOUNTER — TELEPHONE (OUTPATIENT)
Dept: RHEUMATOLOGY | Facility: MEDICAL CENTER | Age: 63
End: 2023-08-31

## 2023-08-31 NOTE — TELEPHONE ENCOUNTER
Please notify patient that we have temporarily denied his Taltz as patient needs to get his labs done that were ordered August 7, 2023

## 2023-09-05 ENCOUNTER — HOSPITAL ENCOUNTER (OUTPATIENT)
Dept: LAB | Facility: MEDICAL CENTER | Age: 63
End: 2023-09-05
Attending: INTERNAL MEDICINE
Payer: COMMERCIAL

## 2023-09-05 DIAGNOSIS — Z51.81 MEDICATION MONITORING ENCOUNTER: ICD-10-CM

## 2023-09-05 DIAGNOSIS — K76.0 FATTY LIVER: ICD-10-CM

## 2023-09-05 DIAGNOSIS — L40.50 PSORIATIC ARTHRITIS (HCC): ICD-10-CM

## 2023-09-05 LAB
ALBUMIN SERPL BCP-MCNC: 4.3 G/DL (ref 3.2–4.9)
ALBUMIN/GLOB SERPL: 1.5 G/DL
ALP SERPL-CCNC: 101 U/L (ref 30–99)
ALT SERPL-CCNC: 55 U/L (ref 2–50)
ANION GAP SERPL CALC-SCNC: 11 MMOL/L (ref 7–16)
AST SERPL-CCNC: 47 U/L (ref 12–45)
BASOPHILS # BLD AUTO: 0.5 % (ref 0–1.8)
BASOPHILS # BLD: 0.04 K/UL (ref 0–0.12)
BILIRUB SERPL-MCNC: 0.6 MG/DL (ref 0.1–1.5)
BUN SERPL-MCNC: 15 MG/DL (ref 8–22)
CALCIUM ALBUM COR SERPL-MCNC: 8.9 MG/DL (ref 8.5–10.5)
CALCIUM SERPL-MCNC: 9.1 MG/DL (ref 8.5–10.5)
CHLORIDE SERPL-SCNC: 106 MMOL/L (ref 96–112)
CO2 SERPL-SCNC: 23 MMOL/L (ref 20–33)
CREAT SERPL-MCNC: 1.11 MG/DL (ref 0.5–1.4)
EOSINOPHIL # BLD AUTO: 0.13 K/UL (ref 0–0.51)
EOSINOPHIL NFR BLD: 1.7 % (ref 0–6.9)
ERYTHROCYTE [DISTWIDTH] IN BLOOD BY AUTOMATED COUNT: 42.9 FL (ref 35.9–50)
GFR SERPLBLD CREATININE-BSD FMLA CKD-EPI: 74 ML/MIN/1.73 M 2
GLOBULIN SER CALC-MCNC: 2.8 G/DL (ref 1.9–3.5)
GLUCOSE SERPL-MCNC: 91 MG/DL (ref 65–99)
HCT VFR BLD AUTO: 50.7 % (ref 42–52)
HGB BLD-MCNC: 17.1 G/DL (ref 14–18)
IMM GRANULOCYTES # BLD AUTO: 0.03 K/UL (ref 0–0.11)
IMM GRANULOCYTES NFR BLD AUTO: 0.4 % (ref 0–0.9)
LYMPHOCYTES # BLD AUTO: 1.88 K/UL (ref 1–4.8)
LYMPHOCYTES NFR BLD: 24.4 % (ref 22–41)
MCH RBC QN AUTO: 30.3 PG (ref 27–33)
MCHC RBC AUTO-ENTMCNC: 33.7 G/DL (ref 32.3–36.5)
MCV RBC AUTO: 89.9 FL (ref 81.4–97.8)
MONOCYTES # BLD AUTO: 0.61 K/UL (ref 0–0.85)
MONOCYTES NFR BLD AUTO: 7.9 % (ref 0–13.4)
NEUTROPHILS # BLD AUTO: 5 K/UL (ref 1.82–7.42)
NEUTROPHILS NFR BLD: 65.1 % (ref 44–72)
NRBC # BLD AUTO: 0 K/UL
NRBC BLD-RTO: 0 /100 WBC (ref 0–0.2)
PLATELET # BLD AUTO: 190 K/UL (ref 164–446)
PMV BLD AUTO: 10.1 FL (ref 9–12.9)
POTASSIUM SERPL-SCNC: 4 MMOL/L (ref 3.6–5.5)
PROT SERPL-MCNC: 7.1 G/DL (ref 6–8.2)
RBC # BLD AUTO: 5.64 M/UL (ref 4.7–6.1)
SODIUM SERPL-SCNC: 140 MMOL/L (ref 135–145)
WBC # BLD AUTO: 7.7 K/UL (ref 4.8–10.8)

## 2023-09-05 PROCEDURE — 80053 COMPREHEN METABOLIC PANEL: CPT

## 2023-09-05 PROCEDURE — 85025 COMPLETE CBC W/AUTO DIFF WBC: CPT

## 2023-09-05 PROCEDURE — 36415 COLL VENOUS BLD VENIPUNCTURE: CPT

## 2023-09-05 PROCEDURE — 85652 RBC SED RATE AUTOMATED: CPT

## 2023-09-06 LAB — ERYTHROCYTE [SEDIMENTATION RATE] IN BLOOD BY WESTERGREN METHOD: 4 MM/HOUR (ref 0–20)

## 2023-09-07 DIAGNOSIS — K76.0 FATTY LIVER: ICD-10-CM

## 2023-09-07 DIAGNOSIS — L40.50 PSORIATIC ARTHRITIS (HCC): ICD-10-CM

## 2023-10-03 ENCOUNTER — APPOINTMENT (OUTPATIENT)
Dept: RADIOLOGY | Facility: MEDICAL CENTER | Age: 63
End: 2023-10-03
Attending: INTERNAL MEDICINE
Payer: COMMERCIAL

## 2023-10-05 ENCOUNTER — HOSPITAL ENCOUNTER (OUTPATIENT)
Dept: RADIOLOGY | Facility: MEDICAL CENTER | Age: 63
End: 2023-10-05
Attending: INTERNAL MEDICINE
Payer: COMMERCIAL

## 2023-10-05 DIAGNOSIS — L40.50 PSORIATIC ARTHRITIS (HCC): ICD-10-CM

## 2023-10-05 DIAGNOSIS — K76.0 FATTY LIVER: ICD-10-CM

## 2023-10-05 PROCEDURE — 76705 ECHO EXAM OF ABDOMEN: CPT

## 2024-01-10 ENCOUNTER — APPOINTMENT (RX ONLY)
Dept: URBAN - METROPOLITAN AREA CLINIC 6 | Facility: CLINIC | Age: 64
Setting detail: DERMATOLOGY
End: 2024-01-10

## 2024-01-10 DIAGNOSIS — Z71.89 OTHER SPECIFIED COUNSELING: ICD-10-CM

## 2024-01-10 DIAGNOSIS — Z85.820 PERSONAL HISTORY OF MALIGNANT MELANOMA OF SKIN: ICD-10-CM

## 2024-01-10 DIAGNOSIS — L82.1 OTHER SEBORRHEIC KERATOSIS: ICD-10-CM

## 2024-01-10 DIAGNOSIS — D22 MELANOCYTIC NEVI: ICD-10-CM

## 2024-01-10 DIAGNOSIS — L81.4 OTHER MELANIN HYPERPIGMENTATION: ICD-10-CM

## 2024-01-10 DIAGNOSIS — D18.0 HEMANGIOMA: ICD-10-CM

## 2024-01-10 PROBLEM — D18.01 HEMANGIOMA OF SKIN AND SUBCUTANEOUS TISSUE: Status: ACTIVE | Noted: 2024-01-10

## 2024-01-10 PROBLEM — D22.5 MELANOCYTIC NEVI OF TRUNK: Status: ACTIVE | Noted: 2024-01-10

## 2024-01-10 PROBLEM — D22.72 MELANOCYTIC NEVI OF LEFT LOWER LIMB, INCLUDING HIP: Status: ACTIVE | Noted: 2024-01-10

## 2024-01-10 PROBLEM — D22.62 MELANOCYTIC NEVI OF LEFT UPPER LIMB, INCLUDING SHOULDER: Status: ACTIVE | Noted: 2024-01-10

## 2024-01-10 PROBLEM — D22.61 MELANOCYTIC NEVI OF RIGHT UPPER LIMB, INCLUDING SHOULDER: Status: ACTIVE | Noted: 2024-01-10

## 2024-01-10 PROBLEM — D22.71 MELANOCYTIC NEVI OF RIGHT LOWER LIMB, INCLUDING HIP: Status: ACTIVE | Noted: 2024-01-10

## 2024-01-10 PROCEDURE — ? DIAGNOSIS COMMENT

## 2024-01-10 PROCEDURE — 99213 OFFICE O/P EST LOW 20 MIN: CPT

## 2024-01-10 PROCEDURE — ? COUNSELING

## 2024-01-10 ASSESSMENT — LOCATION DETAILED DESCRIPTION DERM
LOCATION DETAILED: RIGHT PROXIMAL DORSAL FOREARM
LOCATION DETAILED: RIGHT SUPERIOR LATERAL MIDBACK
LOCATION DETAILED: RIGHT PROXIMAL CALF
LOCATION DETAILED: RIGHT VENTRAL DISTAL FOREARM
LOCATION DETAILED: RIGHT ANTERIOR PROXIMAL THIGH
LOCATION DETAILED: RIGHT DISTAL POSTERIOR THIGH
LOCATION DETAILED: INFERIOR THORACIC SPINE
LOCATION DETAILED: LEFT DISTAL POSTERIOR THIGH
LOCATION DETAILED: LEFT ANTERIOR SHOULDER
LOCATION DETAILED: LEFT PROXIMAL DORSAL FOREARM
LOCATION DETAILED: LEFT PROXIMAL CALF
LOCATION DETAILED: RIGHT MID-UPPER BACK
LOCATION DETAILED: LEFT ANTERIOR DISTAL UPPER ARM
LOCATION DETAILED: LEFT CENTRAL MALAR CHEEK
LOCATION DETAILED: RIGHT ANTERIOR DISTAL UPPER ARM
LOCATION DETAILED: LEFT VENTRAL DISTAL FOREARM
LOCATION DETAILED: LEFT INFERIOR UPPER BACK
LOCATION DETAILED: LEFT ANTERIOR PROXIMAL THIGH

## 2024-01-10 ASSESSMENT — LOCATION SIMPLE DESCRIPTION DERM
LOCATION SIMPLE: LEFT CALF
LOCATION SIMPLE: UPPER BACK
LOCATION SIMPLE: RIGHT UPPER ARM
LOCATION SIMPLE: LEFT SHOULDER
LOCATION SIMPLE: RIGHT UPPER BACK
LOCATION SIMPLE: LEFT UPPER ARM
LOCATION SIMPLE: RIGHT LOWER BACK
LOCATION SIMPLE: LEFT POSTERIOR THIGH
LOCATION SIMPLE: RIGHT FOREARM
LOCATION SIMPLE: RIGHT THIGH
LOCATION SIMPLE: RIGHT CALF
LOCATION SIMPLE: LEFT FOREARM
LOCATION SIMPLE: RIGHT POSTERIOR THIGH
LOCATION SIMPLE: LEFT UPPER BACK
LOCATION SIMPLE: LEFT CHEEK
LOCATION SIMPLE: LEFT THIGH

## 2024-01-10 ASSESSMENT — LOCATION ZONE DERM
LOCATION ZONE: TRUNK
LOCATION ZONE: ARM
LOCATION ZONE: FACE
LOCATION ZONE: LEG

## 2024-01-10 NOTE — PROCEDURE: MIPS QUALITY
Detail Level: Detailed
Quality 130: Documentation Of Current Medications In The Medical Record: Current Medications Documented
Quality 397: Melanoma: Reporting: Pathology report includes the pT Category, thickness, ulceration and mitotic rate, peripheral and deep margin status and presence or absence of microsatellitosis for invasive tumors.
Quality 137: Melanoma: Continuity Of Care - Recall System: Patient information entered into a recall system that includes: target date for the next exam specified AND a process to follow up with patients regarding missed or unscheduled appointments
Quality 226: Preventive Care And Screening: Tobacco Use: Screening And Cessation Intervention: Patient screened for tobacco use and is an ex/non-smoker
Quality 138: Melanoma: Coordination Of Care: A treatment plan was communicated to the physicians providing continuing care within one month of diagnosis outlining: diagnosis, tumor thickness and a plan for surgery or alternate care.

## 2024-01-18 ENCOUNTER — TELEPHONE (OUTPATIENT)
Dept: RHEUMATOLOGY | Facility: MEDICAL CENTER | Age: 64
End: 2024-01-18

## 2024-01-18 DIAGNOSIS — R79.89 LFTS ABNORMAL: ICD-10-CM

## 2024-01-18 DIAGNOSIS — R79.89 ELEVATED FERRITIN: ICD-10-CM

## 2024-01-18 NOTE — TELEPHONE ENCOUNTER
Please notify patient that we received the results of his recent blood tests looks like he is borderline diabetic, that can cause liver problems, recommend discussed with his primary care doctor about treatment options  Additionally the lab result called ferritin was just above normal, this could be associated with too much iron in the blood which they can also cause liver problems recommend checking iron levels in the blood as well as a genetic test to see if patient is prone to having problems with iron in his liver and subsequently causing liver problems.  Labs ordered in the computer, patient does not need to be fasting

## 2024-01-29 ENCOUNTER — HOSPITAL ENCOUNTER (OUTPATIENT)
Dept: LAB | Facility: MEDICAL CENTER | Age: 64
End: 2024-01-29
Attending: INTERNAL MEDICINE
Payer: COMMERCIAL

## 2024-01-29 DIAGNOSIS — K76.0 FATTY LIVER: ICD-10-CM

## 2024-01-29 DIAGNOSIS — L40.50 PSORIATIC ARTHRITIS (HCC): ICD-10-CM

## 2024-01-29 DIAGNOSIS — R79.89 ELEVATED FERRITIN: ICD-10-CM

## 2024-01-29 DIAGNOSIS — R79.89 LFTS ABNORMAL: ICD-10-CM

## 2024-01-29 LAB
IRON SATN MFR SERPL: 32 % (ref 15–55)
IRON SERPL-MCNC: 105 UG/DL (ref 50–180)
TIBC SERPL-MCNC: 327 UG/DL (ref 250–450)
UIBC SERPL-MCNC: 222 UG/DL (ref 110–370)

## 2024-01-29 PROCEDURE — 83036 HEMOGLOBIN GLYCOSYLATED A1C: CPT

## 2024-01-29 PROCEDURE — 36415 COLL VENOUS BLD VENIPUNCTURE: CPT

## 2024-01-29 PROCEDURE — 81256 HFE GENE: CPT

## 2024-01-29 PROCEDURE — 82728 ASSAY OF FERRITIN: CPT

## 2024-01-29 PROCEDURE — 83550 IRON BINDING TEST: CPT

## 2024-01-29 PROCEDURE — 83540 ASSAY OF IRON: CPT

## 2024-01-30 LAB
EST. AVERAGE GLUCOSE BLD GHB EST-MCNC: 120 MG/DL
FERRITIN SERPL-MCNC: 500 NG/ML (ref 22–322)
HBA1C MFR BLD: 5.8 % (ref 4–5.6)

## 2024-02-03 LAB
HFE GENE MUT ANL BLD/T: NORMAL
HFE P.C282Y BLD/T QL: NEGATIVE
HFE P.H63D BLD/T QL: NORMAL
HFE P.S65C BLD/T QL: NEGATIVE

## 2024-02-12 ENCOUNTER — OFFICE VISIT (OUTPATIENT)
Dept: RHEUMATOLOGY | Facility: MEDICAL CENTER | Age: 64
End: 2024-02-12
Attending: INTERNAL MEDICINE
Payer: COMMERCIAL

## 2024-02-12 VITALS
SYSTOLIC BLOOD PRESSURE: 110 MMHG | TEMPERATURE: 97.8 F | RESPIRATION RATE: 14 BRPM | WEIGHT: 218 LBS | BODY MASS INDEX: 30.4 KG/M2 | OXYGEN SATURATION: 96 % | DIASTOLIC BLOOD PRESSURE: 60 MMHG | HEART RATE: 82 BPM

## 2024-02-12 DIAGNOSIS — I10 ESSENTIAL HYPERTENSION: ICD-10-CM

## 2024-02-12 DIAGNOSIS — K76.0 FATTY LIVER: ICD-10-CM

## 2024-02-12 DIAGNOSIS — Z51.81 MEDICATION MONITORING ENCOUNTER: ICD-10-CM

## 2024-02-12 DIAGNOSIS — L40.50 PSORIATIC ARTHRITIS (HCC): ICD-10-CM

## 2024-02-12 DIAGNOSIS — R73.03 PRE-DIABETES: ICD-10-CM

## 2024-02-12 PROCEDURE — 3078F DIAST BP <80 MM HG: CPT | Performed by: INTERNAL MEDICINE

## 2024-02-12 PROCEDURE — 99211 OFF/OP EST MAY X REQ PHY/QHP: CPT

## 2024-02-12 PROCEDURE — 99214 OFFICE O/P EST MOD 30 MIN: CPT | Performed by: INTERNAL MEDICINE

## 2024-02-12 PROCEDURE — 3074F SYST BP LT 130 MM HG: CPT | Performed by: INTERNAL MEDICINE

## 2024-02-12 ASSESSMENT — PATIENT HEALTH QUESTIONNAIRE - PHQ9: CLINICAL INTERPRETATION OF PHQ2 SCORE: 0

## 2024-02-12 ASSESSMENT — FIBROSIS 4 INDEX: FIB4 SCORE: 2.1

## 2024-02-12 NOTE — PROGRESS NOTES
Chief Complaint- joint pain     Subjective:   Evens Garcia is a 63 y.o. male here today for follow up of rheumatological issues    This is a follow-up visit for this patient who we see in this clinic for psoriatic arthritis.  Patient is currently on Taltz 80 mg subcu every 4 weeks with excellent results.  Patient denies any side effects from medications, states his joints are doing quite well without any flares.  Of note recent sedimentation rate equals 4 September 2023.     Comorbidities include elevated liver functions status post liver ultrasound indicating fatty liver with all other negative serologies.  Of note patient recently had a hemoglobin A1c that did indicate borderline diabetes, patient currently the process of losing weight.      Additional comorbidity includes chronic left knee pain status post skiing accident with a meniscal injury.  Patient also history of melanoma resection.        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     Hemochromatosis genotype neg 2/2024  U-1 neg 2/2020 [facility]  U-2 neg 2/2020 [facility]  Mi-2 neg 2/2020 [facility]  Ku neg2/2020 [facility]  SRP neg 2/2020 [facility]  PL-7 neg 2/2020 [facility]  PL-12 neg 2/2020 [facility]  E-J neg 2/2020 [facility]  O-J neg 2/2020 [facility]  RO-52 pos 2/2020 [facility]  PM-Scl neg 2/2020 [facility]  SELENA-1 neg 2/2020 [facility]  Anti p155/140 (TIF-1) neg 2/2020 [facility]  MDA5 (CADM 140) neg 2/2020 [facility]  SAE1 neg 2/2020 [facility]  U3 RNP neg 2/2020 [facility]  Anti CN1A ab neg 3/2020     HBsAg/HBcAb IgM neg 3/2023  HCV neg 3/2023  Quantiferon Gold neg 3/2023  Anti-actin ab neg 7/2018-[facility]  Mitochondrial ab neg 7/2018 [facility]  CCP neg 1/2018 [facility]  RF neg 3/2013  TINA neg 3/2013  Uric acid 4.9 1/2018 [facility]     Feet x-rays 1/2017-indicates erosive arthritis on the left fifth MTP  joint  Hand x-rays 1/2017-indicates DJD  SI joint x-rays 1/2017-no pathology  Liver ultrasound 7/2018-indicates fatty liver     Liver Ultrasound 10/2023-IMPRESSION:  1.  Hepatic steatosis with focal fatty sparing near gallbladder fossa.          Current Outpatient Medications   Medication Sig Dispense Refill    Ixekizumab 80 MG/ML Solution Auto-injector 160 mg SQ first dose then 80 mg sq every 4 weeks 12 mL 1    Multiple Vitamins-Minerals (OCUVITE ADULT 50+ PO) Take  by mouth.      VITAMIN D PO Take  by mouth. (Patient not taking: Reported on 2/12/2024)      Ascorbic Acid (VITAMIN C PO) Take  by mouth. (Patient not taking: Reported on 2/12/2024)      VITAMIN E PO Take  by mouth. (Patient not taking: Reported on 2/12/2024)      ibuprofen (MOTRIN) 200 MG TABS Take 200 mg by mouth every 6 hours as needed. Takes two prn  (Patient not taking: Reported on 2/12/2024)       No current facility-administered medications for this visit.     He  has no past medical history on file.    ROS   Other than what is mentioned in HPI or physical exam, there is no history of headaches, double vision or blurred vision.  No trouble swallowing difficulties .  No chest complaints including chest pain, cough or sputum production. No GI complaints including nausea, vomiting, change in bowel habits, or past peptic ulcer disease. No history of blood in the stools. No urinary complaints including dysuria or frequency. No history of alopecia, photosensitivity     Objective:     /60   Pulse 82   Temp 36.6 °C (97.8 °F) (Temporal)   Resp 14   Wt 98.9 kg (218 lb)   SpO2 96%  Body mass index is 30.4 kg/m².   Physical Exam:    Constitutional: Alert and oriented X3, patient is talkative with good eye contact.Skin: Warm, dry, good turgor, no rashes in visible areas.Eye: Equal, round and reactive, conjunctiva clear, lids normal EOM intactENMT: Lips without lesions,  pinna without deformityNeck: Trachea midline, no masses, no thyromegaly.Lymph:   No cervical lymphadenopathy, no axillary lymphadenopathy, no supraclavicular lymphadenopathyRespiratory: Unlabored respiratory effort, lungs clear to auscultation, no wheezes, no ronchi.Cardiovascular: Normal S1, S2, Regular rate and rhythm, no murmurs rubs or gallops  .Abdomen: Soft, non-distended.Psych: Alert and oriented x3, normal affect and mood.Neuro: Cranial nerves 2-12 are grossly intact Ext:no joint laxity noted in bilateral arms, no joint laxity noted in bilateral legs, joints look great, no swan-neck or boutonniere deformities, no sausage digits, no dactylitis, toes without crossover toes and without splay toes, elbows without flexion contractures, gait without antalgia and without foot drop    Lab Results   Component Value Date/Time    HEPBCORIGM Non-Reactive 03/13/2023 10:58 AM    HEPBSAG Non-Reactive 03/13/2023 10:58 AM     Lab Results   Component Value Date/Time    HEPCAB Non-Reactive 03/13/2023 10:58 AM     Lab Results   Component Value Date/Time    SODIUM 140 09/05/2023 01:48 PM    POTASSIUM 4.0 09/05/2023 01:48 PM    CHLORIDE 106 09/05/2023 01:48 PM    CO2 23 09/05/2023 01:48 PM    GLUCOSE 91 09/05/2023 01:48 PM    BUN 15 09/05/2023 01:48 PM    CREATININE 1.11 09/05/2023 01:48 PM    CREATININE 1.00 03/15/2013 12:30 PM    BUNCREATRAT 22 (H) 03/15/2013 12:30 PM    GLOMRATE >59 11/19/2010 08:43 AM      Lab Results   Component Value Date/Time    WBC 7.7 09/05/2023 01:48 PM    WBC 9.2 03/15/2013 12:30 PM    RBC 5.64 09/05/2023 01:48 PM    RBC 5.33 03/15/2013 12:30 PM    HEMOGLOBIN 17.1 09/05/2023 01:48 PM    HEMATOCRIT 50.7 09/05/2023 01:48 PM    MCV 89.9 09/05/2023 01:48 PM    MCV 83 03/15/2013 12:30 PM    MCH 30.3 09/05/2023 01:48 PM    MCH 28.3 03/15/2013 12:30 PM    MCHC 33.7 09/05/2023 01:48 PM    MPV 10.1 09/05/2023 01:48 PM    NEUTSPOLYS 65.10 09/05/2023 01:48 PM    LYMPHOCYTES 24.40 09/05/2023 01:48 PM    MONOCYTES 7.90 09/05/2023 01:48 PM    EOSINOPHILS 1.70 09/05/2023 01:48 PM    BASOPHILS  0.50 09/05/2023 01:48 PM      Lab Results   Component Value Date/Time    CALCIUM 9.1 09/05/2023 01:48 PM    ASTSGOT 47 (H) 09/05/2023 01:48 PM    ALTSGPT 55 (H) 09/05/2023 01:48 PM    ALKPHOSPHAT 101 (H) 09/05/2023 01:48 PM    TBILIRUBIN 0.6 09/05/2023 01:48 PM    ALBUMIN 4.3 09/05/2023 01:48 PM    TOTPROTEIN 7.1 09/05/2023 01:48 PM     Lab Results   Component Value Date/Time    RHEUMFACTN 8.8 03/15/2013 12:30 PM     Lab Results   Component Value Date/Time    ANADIRECT Negative 03/15/2013 12:30 PM     Lab Results   Component Value Date/Time    SEDRATEWES 4 09/05/2023 01:48 PM     Lab Results   Component Value Date/Time    HBA1C 5.8 (H) 01/29/2024 03:37 PM     Lab Results   Component Value Date/Time    CPKTOTAL 582 (H) 07/27/2020 06:58 AM     Assessment and Plan:     1. Psoriatic arthritis (HCC)  Clinically stable on Taltz 80 mg subcu every 4 weeks, we will continue as such  - Comp Metabolic Panel; Future  - CBC WITH DIFFERENTIAL; Future  - Sed Rate; Future    2. Medication monitoring encounter  Currently on Taltz 80 mg subcu every 4 weeks, screening labs up-to-date, neck screening labs will be due about March 2025, patient needs monitoring labs every 6 months, next labs due March 2024, labs ordered for patient  We reviewed risks of biological medications with patient including hematological pathology, cancer risks, neurological and infection issues especially in the Covid-19 pandemic environment, patient states understanding.  - Comp Metabolic Panel; Future  - CBC WITH DIFFERENTIAL; Future  - Sed Rate; Future    3. Fatty liver  Now found to have borderline positive hemoglobin A1c, patient in process of losing weight to correct.  - Comp Metabolic Panel; Future  - CBC WITH DIFFERENTIAL; Future  - Sed Rate; Future    4. Essential hypertension  May impact the type of medications we can use for this patient's arthritis. We will have to keep this under advisement.    5. Pre-diabetes  Currently managed by PCP    Followup:  Return in about 7 months (around 9/12/2024). or sooner abdoul Garcia  was seen 30 minutes face-to-face of which more than 50% of the time was spent counseling the patient (excluding time for procedures)  regarding  rheumatological condition and care. Therapy was discussed in detail.      Please note that this dictation was created using voice recognition software. I have made every reasonable attempt to correct obvious errors, but I expect that there are errors of grammar and possibly content that I did not discover before finalizing the note.

## 2024-02-12 NOTE — LETTER
February 12, 2024        Stas Garcia    To Whom it may Concern,    We are treating this patient for psoriatic arthritis and is being treated with Taltz 80 mg subcutaneous every 4 weeks with excellent results. Patient does not have any physical deficits and is not having any side effects from medications.      Henny Yuan M.D. [facility]

## 2024-02-21 DIAGNOSIS — L40.50 PSORIATIC ARTHRITIS (HCC): ICD-10-CM

## 2024-02-21 DIAGNOSIS — Z51.81 MEDICATION MONITORING ENCOUNTER: ICD-10-CM

## 2024-02-21 NOTE — TELEPHONE ENCOUNTER
Received request via: [facility]      Was the patient seen in the last year in this department? Yes    Does the patient have an active prescription (recently filled or refills available) for medication(s) requested? No    Pharmacy Name: [facility]    Does the patient have skilled nursing Plus and need 100 day supply (blood pressure, diabetes and cholesterol meds only)? Medication is not for cholesterol, blood pressure or diabetes

## 2024-02-22 RX ORDER — IXEKIZUMAB 80 MG/ML
INJECTION, SOLUTION SUBCUTANEOUS
Qty: 3 ML | Refills: 0 | Status: SHIPPED | OUTPATIENT
Start: 2024-02-22

## 2024-04-08 ENCOUNTER — HOSPITAL ENCOUNTER (OUTPATIENT)
Dept: LAB | Facility: MEDICAL CENTER | Age: 64
End: 2024-04-08
Attending: INTERNAL MEDICINE
Payer: COMMERCIAL

## 2024-04-08 ENCOUNTER — HOSPITAL ENCOUNTER (OUTPATIENT)
Dept: LAB | Facility: MEDICAL CENTER | Age: 64
End: 2024-04-08
Payer: COMMERCIAL

## 2024-04-08 DIAGNOSIS — Z51.81 MEDICATION MONITORING ENCOUNTER: ICD-10-CM

## 2024-04-08 DIAGNOSIS — K76.0 FATTY LIVER: ICD-10-CM

## 2024-04-08 DIAGNOSIS — L40.50 PSORIATIC ARTHRITIS (HCC): ICD-10-CM

## 2024-04-08 LAB
ALBUMIN SERPL BCP-MCNC: 4.6 G/DL (ref 3.2–4.9)
ALBUMIN/GLOB SERPL: 1.7 G/DL
ALP SERPL-CCNC: 98 U/L (ref 30–99)
ALT SERPL-CCNC: 39 U/L (ref 2–50)
ANION GAP SERPL CALC-SCNC: 13 MMOL/L (ref 7–16)
AST SERPL-CCNC: 47 U/L (ref 12–45)
BASOPHILS # BLD AUTO: 0.6 % (ref 0–1.8)
BASOPHILS # BLD: 0.04 K/UL (ref 0–0.12)
BILIRUB SERPL-MCNC: 0.6 MG/DL (ref 0.1–1.5)
BUN SERPL-MCNC: 14 MG/DL (ref 8–22)
CALCIUM ALBUM COR SERPL-MCNC: 8.9 MG/DL (ref 8.5–10.5)
CALCIUM SERPL-MCNC: 9.4 MG/DL (ref 8.5–10.5)
CHLORIDE SERPL-SCNC: 102 MMOL/L (ref 96–112)
CHOLEST SERPL-MCNC: 184 MG/DL (ref 100–199)
CO2 SERPL-SCNC: 24 MMOL/L (ref 20–33)
CREAT SERPL-MCNC: 0.87 MG/DL (ref 0.5–1.4)
EOSINOPHIL # BLD AUTO: 0.15 K/UL (ref 0–0.51)
EOSINOPHIL NFR BLD: 2.2 % (ref 0–6.9)
ERYTHROCYTE [DISTWIDTH] IN BLOOD BY AUTOMATED COUNT: 42.7 FL (ref 35.9–50)
ERYTHROCYTE [SEDIMENTATION RATE] IN BLOOD BY WESTERGREN METHOD: 3 MM/HOUR (ref 0–20)
EST. AVERAGE GLUCOSE BLD GHB EST-MCNC: 114 MG/DL
GFR SERPLBLD CREATININE-BSD FMLA CKD-EPI: 96 ML/MIN/1.73 M 2
GLOBULIN SER CALC-MCNC: 2.7 G/DL (ref 1.9–3.5)
GLUCOSE SERPL-MCNC: 84 MG/DL (ref 65–99)
HBA1C MFR BLD: 5.6 % (ref 4–5.6)
HCT VFR BLD AUTO: 50.3 % (ref 42–52)
HDLC SERPL-MCNC: 50 MG/DL
HGB BLD-MCNC: 16.9 G/DL (ref 14–18)
IMM GRANULOCYTES # BLD AUTO: 0.02 K/UL (ref 0–0.11)
IMM GRANULOCYTES NFR BLD AUTO: 0.3 % (ref 0–0.9)
LDLC SERPL CALC-MCNC: 122 MG/DL
LYMPHOCYTES # BLD AUTO: 1.89 K/UL (ref 1–4.8)
LYMPHOCYTES NFR BLD: 27.5 % (ref 22–41)
MCH RBC QN AUTO: 30.7 PG (ref 27–33)
MCHC RBC AUTO-ENTMCNC: 33.6 G/DL (ref 32.3–36.5)
MCV RBC AUTO: 91.3 FL (ref 81.4–97.8)
MONOCYTES # BLD AUTO: 0.49 K/UL (ref 0–0.85)
MONOCYTES NFR BLD AUTO: 7.1 % (ref 0–13.4)
NEUTROPHILS # BLD AUTO: 4.28 K/UL (ref 1.82–7.42)
NEUTROPHILS NFR BLD: 62.3 % (ref 44–72)
NRBC # BLD AUTO: 0 K/UL
NRBC BLD-RTO: 0 /100 WBC (ref 0–0.2)
PLATELET # BLD AUTO: 184 K/UL (ref 164–446)
PMV BLD AUTO: 10.2 FL (ref 9–12.9)
POTASSIUM SERPL-SCNC: 4.8 MMOL/L (ref 3.6–5.5)
PROT SERPL-MCNC: 7.3 G/DL (ref 6–8.2)
PSA SERPL-MCNC: 2.55 NG/ML (ref 0–4)
RBC # BLD AUTO: 5.51 M/UL (ref 4.7–6.1)
SODIUM SERPL-SCNC: 139 MMOL/L (ref 135–145)
TRIGL SERPL-MCNC: 58 MG/DL (ref 0–149)
TSH SERPL DL<=0.005 MIU/L-ACNC: 1.45 UIU/ML (ref 0.38–5.33)
WBC # BLD AUTO: 6.9 K/UL (ref 4.8–10.8)

## 2024-04-08 PROCEDURE — 85652 RBC SED RATE AUTOMATED: CPT

## 2024-04-08 PROCEDURE — 36415 COLL VENOUS BLD VENIPUNCTURE: CPT

## 2024-04-08 PROCEDURE — 80061 LIPID PANEL: CPT

## 2024-04-08 PROCEDURE — 84153 ASSAY OF PSA TOTAL: CPT

## 2024-04-08 PROCEDURE — 85025 COMPLETE CBC W/AUTO DIFF WBC: CPT

## 2024-04-08 PROCEDURE — 83036 HEMOGLOBIN GLYCOSYLATED A1C: CPT

## 2024-04-08 PROCEDURE — 84443 ASSAY THYROID STIM HORMONE: CPT

## 2024-04-08 PROCEDURE — 80053 COMPREHEN METABOLIC PANEL: CPT

## 2024-07-24 ENCOUNTER — APPOINTMENT (RX ONLY)
Dept: URBAN - METROPOLITAN AREA CLINIC 6 | Facility: CLINIC | Age: 64
Setting detail: DERMATOLOGY
End: 2024-07-24

## 2024-07-24 DIAGNOSIS — D18.0 HEMANGIOMA: ICD-10-CM

## 2024-07-24 DIAGNOSIS — L82.1 OTHER SEBORRHEIC KERATOSIS: ICD-10-CM

## 2024-07-24 DIAGNOSIS — D22 MELANOCYTIC NEVI: ICD-10-CM

## 2024-07-24 DIAGNOSIS — Z85.820 PERSONAL HISTORY OF MALIGNANT MELANOMA OF SKIN: ICD-10-CM

## 2024-07-24 DIAGNOSIS — Z71.89 OTHER SPECIFIED COUNSELING: ICD-10-CM

## 2024-07-24 DIAGNOSIS — L81.4 OTHER MELANIN HYPERPIGMENTATION: ICD-10-CM

## 2024-07-24 PROBLEM — D18.01 HEMANGIOMA OF SKIN AND SUBCUTANEOUS TISSUE: Status: ACTIVE | Noted: 2024-07-24

## 2024-07-24 PROBLEM — D22.5 MELANOCYTIC NEVI OF TRUNK: Status: ACTIVE | Noted: 2024-07-24

## 2024-07-24 PROBLEM — D48.5 NEOPLASM OF UNCERTAIN BEHAVIOR OF SKIN: Status: ACTIVE | Noted: 2024-07-24

## 2024-07-24 PROBLEM — D22.62 MELANOCYTIC NEVI OF LEFT UPPER LIMB, INCLUDING SHOULDER: Status: ACTIVE | Noted: 2024-07-24

## 2024-07-24 PROBLEM — D22.61 MELANOCYTIC NEVI OF RIGHT UPPER LIMB, INCLUDING SHOULDER: Status: ACTIVE | Noted: 2024-07-24

## 2024-07-24 PROBLEM — D22.72 MELANOCYTIC NEVI OF LEFT LOWER LIMB, INCLUDING HIP: Status: ACTIVE | Noted: 2024-07-24

## 2024-07-24 PROBLEM — D22.71 MELANOCYTIC NEVI OF RIGHT LOWER LIMB, INCLUDING HIP: Status: ACTIVE | Noted: 2024-07-24

## 2024-07-24 PROCEDURE — 99213 OFFICE O/P EST LOW 20 MIN: CPT | Mod: 25

## 2024-07-24 PROCEDURE — 11102 TANGNTL BX SKIN SINGLE LES: CPT

## 2024-07-24 PROCEDURE — ? DIAGNOSIS COMMENT

## 2024-07-24 PROCEDURE — ? COUNSELING

## 2024-07-24 PROCEDURE — ? BIOPSY BY SHAVE METHOD

## 2024-07-24 ASSESSMENT — LOCATION DETAILED DESCRIPTION DERM
LOCATION DETAILED: RIGHT DISTAL POSTERIOR THIGH
LOCATION DETAILED: RIGHT PROXIMAL CALF
LOCATION DETAILED: LEFT INFERIOR UPPER BACK
LOCATION DETAILED: LEFT DISTAL POSTERIOR THIGH
LOCATION DETAILED: LEFT PROXIMAL CALF
LOCATION DETAILED: RIGHT VENTRAL DISTAL FOREARM
LOCATION DETAILED: RIGHT ANTERIOR DISTAL UPPER ARM
LOCATION DETAILED: LEFT VENTRAL DISTAL FOREARM
LOCATION DETAILED: RIGHT MID-UPPER BACK
LOCATION DETAILED: RIGHT ANTERIOR PROXIMAL THIGH
LOCATION DETAILED: RIGHT MEDIAL ZYGOMA
LOCATION DETAILED: LEFT ANTERIOR PROXIMAL THIGH
LOCATION DETAILED: LEFT PROXIMAL DORSAL FOREARM
LOCATION DETAILED: INFERIOR THORACIC SPINE
LOCATION DETAILED: LEFT CENTRAL MALAR CHEEK
LOCATION DETAILED: RIGHT SUPERIOR LATERAL MIDBACK
LOCATION DETAILED: LEFT ANTERIOR DISTAL UPPER ARM
LOCATION DETAILED: RIGHT PROXIMAL DORSAL FOREARM
LOCATION DETAILED: LEFT ANTERIOR SHOULDER

## 2024-07-24 ASSESSMENT — LOCATION SIMPLE DESCRIPTION DERM
LOCATION SIMPLE: RIGHT THIGH
LOCATION SIMPLE: RIGHT UPPER ARM
LOCATION SIMPLE: LEFT CHEEK
LOCATION SIMPLE: UPPER BACK
LOCATION SIMPLE: RIGHT UPPER BACK
LOCATION SIMPLE: LEFT POSTERIOR THIGH
LOCATION SIMPLE: LEFT THIGH
LOCATION SIMPLE: RIGHT POSTERIOR THIGH
LOCATION SIMPLE: RIGHT LOWER BACK
LOCATION SIMPLE: LEFT UPPER BACK
LOCATION SIMPLE: RIGHT FOREARM
LOCATION SIMPLE: LEFT UPPER ARM
LOCATION SIMPLE: LEFT CALF
LOCATION SIMPLE: RIGHT CALF
LOCATION SIMPLE: LEFT SHOULDER
LOCATION SIMPLE: RIGHT ZYGOMA
LOCATION SIMPLE: LEFT FOREARM

## 2024-07-24 ASSESSMENT — LOCATION ZONE DERM
LOCATION ZONE: LEG
LOCATION ZONE: ARM
LOCATION ZONE: FACE
LOCATION ZONE: TRUNK

## 2024-09-12 ENCOUNTER — OFFICE VISIT (OUTPATIENT)
Dept: RHEUMATOLOGY | Facility: MEDICAL CENTER | Age: 64
End: 2024-09-12
Attending: INTERNAL MEDICINE
Payer: COMMERCIAL

## 2024-09-12 VITALS
DIASTOLIC BLOOD PRESSURE: 60 MMHG | OXYGEN SATURATION: 97 % | SYSTOLIC BLOOD PRESSURE: 110 MMHG | RESPIRATION RATE: 14 BRPM | TEMPERATURE: 97.1 F | WEIGHT: 193 LBS | HEART RATE: 75 BPM | BODY MASS INDEX: 26.92 KG/M2

## 2024-09-12 DIAGNOSIS — Z51.81 MEDICATION MONITORING ENCOUNTER: ICD-10-CM

## 2024-09-12 DIAGNOSIS — K76.0 FATTY LIVER: ICD-10-CM

## 2024-09-12 DIAGNOSIS — L40.50 PSORIATIC ARTHRITIS (HCC): ICD-10-CM

## 2024-09-12 DIAGNOSIS — I10 ESSENTIAL HYPERTENSION: ICD-10-CM

## 2024-09-12 PROCEDURE — 3074F SYST BP LT 130 MM HG: CPT | Performed by: INTERNAL MEDICINE

## 2024-09-12 PROCEDURE — 3078F DIAST BP <80 MM HG: CPT | Performed by: INTERNAL MEDICINE

## 2024-09-12 PROCEDURE — 99212 OFFICE O/P EST SF 10 MIN: CPT | Performed by: INTERNAL MEDICINE

## 2024-09-12 PROCEDURE — 99214 OFFICE O/P EST MOD 30 MIN: CPT | Performed by: INTERNAL MEDICINE

## 2024-09-12 RX ORDER — IXEKIZUMAB 80 MG/ML
INJECTION, SOLUTION SUBCUTANEOUS
Qty: 3 ML | Refills: 1 | Status: SHIPPED | OUTPATIENT
Start: 2024-09-12

## 2024-09-12 ASSESSMENT — FIBROSIS 4 INDEX: FIB4 SCORE: 2.62

## 2024-09-12 NOTE — PROGRESS NOTES
Chief Complaint- joint pain     Subjective:   Evens Garcia is a 64 y.o. male here today for follow up of rheumatological issues    This is a follow-up visit for this patient who we see in this clinic for psoriatic arthritis.  Patient is currently doing Taltz 80 mg subcu every 4 weeks with excellent results and continues to have excellent results.  Patient denies any side effects from medications.   Patient denies any side effects from the medication, denies any unexplained weight loss, denies any fevers of unknown etiology, denies any GI upset, denies any rashes, denies any new joint swelling, denies recurrent infections.   Of note recent sedimentation rate equals 3 April 2024.     Comorbidities include elevated liver functions status post liver ultrasound indicating fatty liver with all other negative serologies.      Additional comorbidity includes chronic left knee pain status post skiing accident with a meniscal injury.  Patient also history of melanoma resection.        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     Hemochromatosis genotype neg 2/2024  U-1 neg 2/2020 [facility]  U-2 neg 2/2020 [facility]  Mi-2 neg 2/2020 [facility]  Ku neg2/2020 [facility]  SRP neg 2/2020 [facility]  PL-7 neg 2/2020 [facility]  PL-12 neg 2/2020 [facility]  E-J neg 2/2020 [facility]  O-J neg 2/2020 [facility]  RO-52 pos 2/2020 [facility]  PM-Scl neg 2/2020 [facility]  SELENA-1 neg 2/2020 [facility]  Anti p155/140 (TIF-1) neg 2/2020 [facility]  MDA5 (CADM 140) neg 2/2020 [facility]  SAE1 neg 2/2020 [facility]  U3 RNP neg 2/2020 [facility]  Anti CN1A ab neg 3/2020     HBsAg/HBcAb IgM neg 3/2023  HCV neg 3/2023  Quantiferon Gold neg 3/2023  Anti-actin ab neg 7/2018[facility]  Mitochondrial ab neg 7/2018 [facility]  CCP neg 1/2018 [facility]  RF neg 3/2013  TINA neg 3/2013  Uric acid 4.9 1/2018 [facility]      Feet x-rays 1/2017-indicates erosive arthritis on the left fifth MTP joint  Hand x-rays 1/2017-indicates DJD  SI joint x-rays 1/2017-no pathology  Liver ultrasound 7/2018-indicates fatty liver     Liver Ultrasound 10/2023-IMPRESSION:  1.  Hepatic steatosis with focal fatty sparing near gallbladder fossa.                Current Outpatient Medications   Medication Sig Dispense Refill    Ixekizumab (TALTZ) 80 MG/ML Solution Auto-injector INJECT 80 MG ( 1 PEN ) UNDER THE SKIN EVERY 4 WEEKS 3 mL 0    Ascorbic Acid (VITAMIN C PO) Take  by mouth.      ibuprofen (MOTRIN) 200 MG TABS Take 200 mg by mouth every 6 hours as needed. Takes two prn      Multiple Vitamins-Minerals (OCUVITE ADULT 50+ PO) Take  by mouth. (Patient not taking: Reported on 9/12/2024)      VITAMIN D PO Take  by mouth. (Patient not taking: Reported on 2/12/2024)      VITAMIN E PO Take  by mouth. (Patient not taking: Reported on 2/12/2024)       No current facility-administered medications for this visit.     He  has no past medical history on file.    ROS   Other than what is mentioned in HPI or physical exam, there is no history of headaches, double vision or blurred vision.  No trouble swallowing difficulties .  No chest complaints including chest pain, cough or sputum production. No GI complaints including nausea, vomiting, change in bowel habits, or past peptic ulcer disease. No history of blood in the stools. No urinary complaints including dysuria or frequency. No history of alopecia, photosensitivity     Objective:     /60   Pulse 75   Temp 36.2 °C (97.1 °F) (Temporal)   Resp 14   Wt 87.5 kg (193 lb)   SpO2 97%  Body mass index is 26.92 kg/m².   Physical Exam:    Constitutional: Alert and oriented X3, patient is talkative with good eye contact.Skin: Warm, dry, good turgor, no rashes in visible areas, dry skin on knuckles but no marsha psoriatic plaques.Eye: Equal, round and reactive, conjunctiva clear, lids normal EOM intactENMT: Lips  without lesions,  pinna without deformityNeck: Trachea midline, no masses, no thyromegaly.Lymph:  No cervical lymphadenopathy, no axillary lymphadenopathy, no supraclavicular lymphadenopathyRespiratory: Unlabored respiratory effort, lungs clear to auscultation, no wheezes, no ronchi.Cardiovascular: Normal S1, S2, Regular rate and rhythm, no murmurs rubs or gallops  .Abdomen: Soft, non-distended.Psych: Alert and oriented x3, normal affect and mood.Neuro: Cranial nerves 2-12 are grossly intact Ext:no joint laxity noted in bilateral arms, no joint laxity noted in bilateral legs, joints look great, no swan-neck or boutonniere deformities no dactylitis no sausage digits, good range of motion of shoulders, elbows without flexion contractures, toes without dactylitis, no crossover toes no splay toes    Lab Results   Component Value Date/Time    HEPBCORIGM Non-Reactive 03/13/2023 10:58 AM    HEPBSAG Non-Reactive 03/13/2023 10:58 AM     Lab Results   Component Value Date/Time    HEPCAB Non-Reactive 03/13/2023 10:58 AM     Lab Results   Component Value Date/Time    SODIUM 139 04/08/2024 03:29 PM    POTASSIUM 4.8 04/08/2024 03:29 PM    CHLORIDE 102 04/08/2024 03:29 PM    CO2 24 04/08/2024 03:29 PM    GLUCOSE 84 04/08/2024 03:29 PM    BUN 14 04/08/2024 03:29 PM    CREATININE 0.87 04/08/2024 03:29 PM    CREATININE 1.00 03/15/2013 12:30 PM    BUNCREATRAT 22 (H) 03/15/2013 12:30 PM    GLOMRATE >59 11/19/2010 08:43 AM      Lab Results   Component Value Date/Time    WBC 6.9 04/08/2024 03:29 PM    WBC 9.2 03/15/2013 12:30 PM    RBC 5.51 04/08/2024 03:29 PM    RBC 5.33 03/15/2013 12:30 PM    HEMOGLOBIN 16.9 04/08/2024 03:29 PM    HEMATOCRIT 50.3 04/08/2024 03:29 PM    MCV 91.3 04/08/2024 03:29 PM    MCV 83 03/15/2013 12:30 PM    MCH 30.7 04/08/2024 03:29 PM    MCH 28.3 03/15/2013 12:30 PM    MCHC 33.6 04/08/2024 03:29 PM    MPV 10.2 04/08/2024 03:29 PM    NEUTSPOLYS 62.30 04/08/2024 03:29 PM    LYMPHOCYTES 27.50 04/08/2024 03:29 PM     MONOCYTES 7.10 04/08/2024 03:29 PM    EOSINOPHILS 2.20 04/08/2024 03:29 PM    BASOPHILS 0.60 04/08/2024 03:29 PM      Lab Results   Component Value Date/Time    CALCIUM 9.4 04/08/2024 03:29 PM    ASTSGOT 47 (H) 04/08/2024 03:29 PM    ALTSGPT 39 04/08/2024 03:29 PM    ALKPHOSPHAT 98 04/08/2024 03:29 PM    TBILIRUBIN 0.6 04/08/2024 03:29 PM    ALBUMIN 4.6 04/08/2024 03:29 PM    TOTPROTEIN 7.3 04/08/2024 03:29 PM     Lab Results   Component Value Date/Time    RHEUMFACTN 8.8 03/15/2013 12:30 PM     Lab Results   Component Value Date/Time    ANADIRECT Negative 03/15/2013 12:30 PM     Lab Results   Component Value Date/Time    SEDRATEWES 3 04/08/2024 03:30 PM     Lab Results   Component Value Date/Time    HBA1C 5.6 04/08/2024 03:29 PM     Lab Results   Component Value Date/Time    CPKTOTAL 582 (H) 07/27/2020 06:58 AM     Assessment and Plan:     1. Psoriatic arthritis (HCC)  Clinically doing quite well with Taltz 80 mg subcu every month, we will continue with such  - Comp Metabolic Panel; Future  - CBC WITH DIFFERENTIAL; Future  - Sed Rate; Future    2. Medication monitoring encounter  Currently on Taltz 80 mg subcu every month, screening labs are up-to-date, next screening labs will be due about March 2025, patient needs monitoring labs every 6 months, next labs due now, labs ordered for patient  We reviewed risks of biological medications with patient including hematological pathology, cancer risks, neurological and infection issues, patient states understanding.  - Comp Metabolic Panel; Future  - CBC WITH DIFFERENTIAL; Future  - Sed Rate; Future    3. Fatty liver  May impact the type of medications we can use for this patient's arthritis. We will have to keep this under advisement.  - Comp Metabolic Panel; Future  - CBC WITH DIFFERENTIAL; Future  - Sed Rate; Future    4. Essential hypertension  May impact the type of medications we can use for this patient's arthritis. We will have to keep this under  advisement.    Followup: Return in about 6 months (around 3/12/2025). or sooner prn      Please note that this dictation was created using voice recognition software. I have made every reasonable attempt to correct obvious errors, but I expect that there are errors of grammar and possibly content that I did not discover before finalizing the note.

## 2024-12-02 ENCOUNTER — PATIENT MESSAGE (OUTPATIENT)
Dept: HEALTH INFORMATION MANAGEMENT | Facility: OTHER | Age: 64
End: 2024-12-02

## 2025-01-06 ENCOUNTER — HOSPITAL ENCOUNTER (OUTPATIENT)
Dept: LAB | Facility: MEDICAL CENTER | Age: 65
End: 2025-01-06
Attending: INTERNAL MEDICINE
Payer: COMMERCIAL

## 2025-01-06 DIAGNOSIS — Z51.81 MEDICATION MONITORING ENCOUNTER: ICD-10-CM

## 2025-01-06 DIAGNOSIS — L40.50 PSORIATIC ARTHRITIS (HCC): ICD-10-CM

## 2025-01-06 DIAGNOSIS — K76.0 FATTY LIVER: ICD-10-CM

## 2025-01-06 LAB
ALBUMIN SERPL BCP-MCNC: 4.4 G/DL (ref 3.2–4.9)
ALBUMIN/GLOB SERPL: 1.6 G/DL
ALP SERPL-CCNC: 86 U/L (ref 30–99)
ALT SERPL-CCNC: 47 U/L (ref 2–50)
ANION GAP SERPL CALC-SCNC: 13 MMOL/L (ref 7–16)
AST SERPL-CCNC: 41 U/L (ref 12–45)
BASOPHILS # BLD AUTO: 0.8 % (ref 0–1.8)
BASOPHILS # BLD: 0.05 K/UL (ref 0–0.12)
BILIRUB SERPL-MCNC: 0.5 MG/DL (ref 0.1–1.5)
BUN SERPL-MCNC: 14 MG/DL (ref 8–22)
CALCIUM ALBUM COR SERPL-MCNC: 8.6 MG/DL (ref 8.5–10.5)
CALCIUM SERPL-MCNC: 8.9 MG/DL (ref 8.5–10.5)
CHLORIDE SERPL-SCNC: 103 MMOL/L (ref 96–112)
CO2 SERPL-SCNC: 22 MMOL/L (ref 20–33)
CREAT SERPL-MCNC: 0.92 MG/DL (ref 0.5–1.4)
EOSINOPHIL # BLD AUTO: 0.26 K/UL (ref 0–0.51)
EOSINOPHIL NFR BLD: 4 % (ref 0–6.9)
ERYTHROCYTE [DISTWIDTH] IN BLOOD BY AUTOMATED COUNT: 42.5 FL (ref 35.9–50)
ERYTHROCYTE [SEDIMENTATION RATE] IN BLOOD BY WESTERGREN METHOD: 3 MM/HOUR (ref 0–20)
GFR SERPLBLD CREATININE-BSD FMLA CKD-EPI: 93 ML/MIN/1.73 M 2
GLOBULIN SER CALC-MCNC: 2.7 G/DL (ref 1.9–3.5)
GLUCOSE SERPL-MCNC: 91 MG/DL (ref 65–99)
HCT VFR BLD AUTO: 50.9 % (ref 42–52)
HGB BLD-MCNC: 17.1 G/DL (ref 14–18)
IMM GRANULOCYTES # BLD AUTO: 0.02 K/UL (ref 0–0.11)
IMM GRANULOCYTES NFR BLD AUTO: 0.3 % (ref 0–0.9)
LYMPHOCYTES # BLD AUTO: 1.79 K/UL (ref 1–4.8)
LYMPHOCYTES NFR BLD: 27.4 % (ref 22–41)
MCH RBC QN AUTO: 30.6 PG (ref 27–33)
MCHC RBC AUTO-ENTMCNC: 33.6 G/DL (ref 32.3–36.5)
MCV RBC AUTO: 91.1 FL (ref 81.4–97.8)
MONOCYTES # BLD AUTO: 0.5 K/UL (ref 0–0.85)
MONOCYTES NFR BLD AUTO: 7.7 % (ref 0–13.4)
NEUTROPHILS # BLD AUTO: 3.91 K/UL (ref 1.82–7.42)
NEUTROPHILS NFR BLD: 59.8 % (ref 44–72)
NRBC # BLD AUTO: 0 K/UL
NRBC BLD-RTO: 0 /100 WBC (ref 0–0.2)
PLATELET # BLD AUTO: 198 K/UL (ref 164–446)
PMV BLD AUTO: 9.9 FL (ref 9–12.9)
POTASSIUM SERPL-SCNC: 4.1 MMOL/L (ref 3.6–5.5)
PROT SERPL-MCNC: 7.1 G/DL (ref 6–8.2)
RBC # BLD AUTO: 5.59 M/UL (ref 4.7–6.1)
SODIUM SERPL-SCNC: 138 MMOL/L (ref 135–145)
WBC # BLD AUTO: 6.5 K/UL (ref 4.8–10.8)

## 2025-01-06 PROCEDURE — 80053 COMPREHEN METABOLIC PANEL: CPT

## 2025-01-06 PROCEDURE — 85025 COMPLETE CBC W/AUTO DIFF WBC: CPT

## 2025-01-06 PROCEDURE — 36415 COLL VENOUS BLD VENIPUNCTURE: CPT

## 2025-01-06 PROCEDURE — 85652 RBC SED RATE AUTOMATED: CPT

## 2025-01-13 ENCOUNTER — OFFICE VISIT (OUTPATIENT)
Dept: RHEUMATOLOGY | Facility: MEDICAL CENTER | Age: 65
End: 2025-01-13
Attending: INTERNAL MEDICINE
Payer: COMMERCIAL

## 2025-01-13 ENCOUNTER — TELEPHONE (OUTPATIENT)
Dept: RHEUMATOLOGY | Facility: MEDICAL CENTER | Age: 65
End: 2025-01-13

## 2025-01-13 VITALS
RESPIRATION RATE: 16 BRPM | HEART RATE: 64 BPM | OXYGEN SATURATION: 97 % | TEMPERATURE: 96.8 F | SYSTOLIC BLOOD PRESSURE: 130 MMHG | WEIGHT: 207 LBS | BODY MASS INDEX: 28.87 KG/M2 | DIASTOLIC BLOOD PRESSURE: 60 MMHG

## 2025-01-13 DIAGNOSIS — K76.0 FATTY LIVER: ICD-10-CM

## 2025-01-13 DIAGNOSIS — I10 ESSENTIAL HYPERTENSION: ICD-10-CM

## 2025-01-13 DIAGNOSIS — Z51.81 MEDICATION MONITORING ENCOUNTER: ICD-10-CM

## 2025-01-13 DIAGNOSIS — L40.50 PSORIATIC ARTHRITIS (HCC): ICD-10-CM

## 2025-01-13 PROCEDURE — 3078F DIAST BP <80 MM HG: CPT | Performed by: INTERNAL MEDICINE

## 2025-01-13 PROCEDURE — 3075F SYST BP GE 130 - 139MM HG: CPT | Performed by: INTERNAL MEDICINE

## 2025-01-13 PROCEDURE — 99214 OFFICE O/P EST MOD 30 MIN: CPT | Performed by: INTERNAL MEDICINE

## 2025-01-13 PROCEDURE — 99212 OFFICE O/P EST SF 10 MIN: CPT | Performed by: INTERNAL MEDICINE

## 2025-01-13 RX ORDER — IXEKIZUMAB 80 MG/ML
INJECTION, SOLUTION SUBCUTANEOUS
Qty: 3 ML | Refills: 0 | Status: SHIPPED | OUTPATIENT
Start: 2025-01-13

## 2025-01-13 ASSESSMENT — FIBROSIS 4 INDEX: FIB4 SCORE: 1.93

## 2025-01-13 ASSESSMENT — PATIENT HEALTH QUESTIONNAIRE - PHQ9: CLINICAL INTERPRETATION OF PHQ2 SCORE: 0

## 2025-01-13 NOTE — PROGRESS NOTES
Chief Complaint- joint pain     Subjective:   Evens Garcia is a 64 y.o. male here today for follow up of rheumatological issues    This is a follow-up visit for this patient who we see in this clinic for psoriatic arthritis.  Patient continues to do quite well on Taltz 80 mg subcu every 4 weeks with excellent results.  Patient states he has not had any flares, feels the skin is doing quite well.   Patient denies any side effects from the medication, denies any unexplained weight loss, denies any fevers of unknown etiology, denies any GI upset, denies any rashes, denies any new joint swelling, denies recurrent infections.   Of note recent sedimentation rate equals 3 January 2025.     Comorbidities include elevated liver functions status post liver ultrasound indicating fatty liver with all other negative serologies.      Additional comorbidity includes chronic left knee pain status post skiing accident with a meniscal injury.  Patient also history of melanoma resection.        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     Hemochromatosis genotype neg 2/2024  U-1 neg 2/2020 [facility]  U-2 neg 2/2020 [facility]  Mi-2 neg 2/2020 [facility]  Ku neg2/2020 [facility]  SRP neg 2/2020 [facility]  PL-7 neg 2/2020 [facility]  PL-12 neg 2/2020 [facility]  E-J neg 2/2020 [facility]  O-J neg 2/2020 [facility]  RO-52 pos 2/2020 [facility]  PM-Scl neg 2/2020 [facility]  SELENA-1 neg 2/2020 [facility]  Anti p155/140 (TIF-1) neg 2/2020 [facility]  MDA5 (CADM 140) neg 2/2020 [facility]  SAE1 neg 2/2020 [facility]  U3 RNP neg 2/2020 [facility]  Anti CN1A ab neg 3/2020    Addendum 1/23/2025  HBsAg/HBcAb neg 1/2025  HCV neg 1/2025  Quantiferon Gold neg 1/2025           HBsAg/HBcAb IgM neg 3/2023  HCV neg 3/2023  Quantiferon Gold neg 3/2023  Anti-actin ab neg 7/2018-[facility]  Mitochondrial ab neg 7/2018  [facility]  CCP neg 1/2018 [facility]  RF neg 3/2013  TINA neg 3/2013  Uric acid 4.9 1/2018 [facility]     Feet x-rays 1/2017-indicates erosive arthritis on the left fifth MTP joint  Hand x-rays 1/2017-indicates DJD  SI joint x-rays 1/2017-no pathology  Liver ultrasound 7/2018-indicates fatty liver     Liver Ultrasound 10/2023-IMPRESSION:  1.  Hepatic steatosis with focal fatty sparing near gallbladder fossa.        Current Outpatient Medications   Medication Sig Dispense Refill    Ixekizumab (TALTZ) 80 MG/ML Solution Auto-injector INJECT 80 MG ( 1 PEN ) UNDER THE SKIN EVERY 4 WEEKS 3 mL 0    Multiple Vitamins-Minerals (OCUVITE ADULT 50+ PO) Take  by mouth.      Ascorbic Acid (VITAMIN C PO) Take  by mouth.      ibuprofen (MOTRIN) 200 MG TABS Take 200 mg by mouth every 6 hours as needed. Takes two prn      VITAMIN D PO Take  by mouth. (Patient not taking: Reported on 1/13/2025)      VITAMIN E PO Take  by mouth. (Patient not taking: Reported on 1/13/2025)       No current facility-administered medications for this visit.     He  has no past medical history on file.    ROS   Other than what is mentioned in HPI or physical exam, there is no history of headaches, double vision or blurred vision.  No trouble swallowing difficulties .  No chest complaints including chest pain, cough or sputum production. No GI complaints including nausea, vomiting, change in bowel habits, or past peptic ulcer disease. No history of blood in the stools. No urinary complaints including dysuria or frequency. No history of alopecia, photosensitivity     Objective:     /60   Pulse 64   Temp 36 °C (96.8 °F) (Temporal)   Resp 16   Wt 93.9 kg (207 lb)   SpO2 97%  Body mass index is 28.87 kg/m².   Physical Exam:    Constitutional: Alert and oriented X3, patient is talkative with good eye contact.Skin: Warm, dry, good turgor, no rashes in visible areas.Eye: Equal, round and reactive, conjunctiva clear, lids normal EOM intactENMT: Lips without  lesions,  pinna without deformityNeck: Trachea midline, no masses, no thyromegaly.Lymph:  No cervical lymphadenopathy, no axillary lymphadenopathy, no supraclavicular lymphadenopathyRespiratory: Unlabored respiratory effort, lungs clear to auscultation, no wheezes, no ronchi.Cardiovascular: Normal S1, S2,Regular rate and rhythm, no murmurs rubs or gallops   .Abdomen: Soft, non-distended.Psych: Alert and oriented x3, normal affect and mood.Neuro: Cranial nerves 2-12 are grossly intact Ext:no joint laxity noted in bilateral arms, no joint laxity noted in bilateral legs, joints look great, no swan-neck or boutonniere changes, no dactylitis, no sausage digits, shoulders full range of motion without limitations, elbows without flexion contractures, toes without sausage digits and without dactylitis, no enthesitis is appreciated    Lab Results   Component Value Date/Time    HEPBCORIGM Non-Reactive 03/13/2023 10:58 AM    HEPBSAG Non-Reactive 03/13/2023 10:58 AM     Lab Results   Component Value Date/Time    HEPCAB Non-Reactive 03/13/2023 10:58 AM     Lab Results   Component Value Date/Time    SODIUM 138 01/06/2025 12:58 PM    POTASSIUM 4.1 01/06/2025 12:58 PM    CHLORIDE 103 01/06/2025 12:58 PM    CO2 22 01/06/2025 12:58 PM    GLUCOSE 91 01/06/2025 12:58 PM    BUN 14 01/06/2025 12:58 PM    CREATININE 0.92 01/06/2025 12:58 PM    CREATININE 1.00 03/15/2013 12:30 PM    BUNCREATRAT 22 (H) 03/15/2013 12:30 PM    GLOMRATE >59 11/19/2010 08:43 AM      Lab Results   Component Value Date/Time    WBC 6.5 01/06/2025 12:58 PM    WBC 9.2 03/15/2013 12:30 PM    RBC 5.59 01/06/2025 12:58 PM    RBC 5.33 03/15/2013 12:30 PM    HEMOGLOBIN 17.1 01/06/2025 12:58 PM    HEMATOCRIT 50.9 01/06/2025 12:58 PM    MCV 91.1 01/06/2025 12:58 PM    MCV 83 03/15/2013 12:30 PM    MCH 30.6 01/06/2025 12:58 PM    MCH 28.3 03/15/2013 12:30 PM    MCHC 33.6 01/06/2025 12:58 PM    MPV 9.9 01/06/2025 12:58 PM    NEUTSPOLYS 59.80 01/06/2025 12:58 PM     LYMPHOCYTES 27.40 01/06/2025 12:58 PM    MONOCYTES 7.70 01/06/2025 12:58 PM    EOSINOPHILS 4.00 01/06/2025 12:58 PM    BASOPHILS 0.80 01/06/2025 12:58 PM      Lab Results   Component Value Date/Time    CALCIUM 8.9 01/06/2025 12:58 PM    ASTSGOT 41 01/06/2025 12:58 PM    ALTSGPT 47 01/06/2025 12:58 PM    ALKPHOSPHAT 86 01/06/2025 12:58 PM    TBILIRUBIN 0.5 01/06/2025 12:58 PM    ALBUMIN 4.4 01/06/2025 12:58 PM    TOTPROTEIN 7.1 01/06/2025 12:58 PM     Lab Results   Component Value Date/Time    RHEUMFACTN 8.8 03/15/2013 12:30 PM     Lab Results   Component Value Date/Time    ANADIRECT Negative 03/15/2013 12:30 PM     Lab Results   Component Value Date/Time    SEDRATEWES 3 01/06/2025 12:58 PM     Lab Results   Component Value Date/Time    HBA1C 5.6 04/08/2024 03:29 PM     Lab Results   Component Value Date/Time    CPKTOTAL 582 (H) 07/27/2020 06:58 AM     Assessment and Plan:     1. Psoriatic arthritis (HCC)  Clinically doing quite well on Taltz 80 mg subcu every month, we will continue with such  - Ixekizumab (TALTZ) 80 MG/ML Solution Auto-injector; INJECT 80 MG ( 1 PEN ) UNDER THE SKIN EVERY 4 WEEKS  Dispense: 3 mL; Refill: 0    2. Medication monitoring encounter  Currently on Taltz 80 mg subcu every month, screening labs are up-to-date, next screening labs will be due March 2025.  Patient needs monitoring labs every 6 months, next labs due by June 2025.  We reviewed risks of biological medications with patient including hematological pathology, cancer risks, neurological and infection issues, patient states understanding.  - Ixekizumab (TALTZ) 80 MG/ML Solution Auto-injector; INJECT 80 MG ( 1 PEN ) UNDER THE SKIN EVERY 4 WEEKS  Dispense: 3 mL; Refill: 0    3. Fatty liver  May impact the type of medications we can use for this patient's arthritis. We will have to keep this under advisement.    4. Essential hypertension  May impact the type of medications we can use for this patient's arthritis. We will have to keep  this under advisement.    Followup: Return in about 6 months (around 7/13/2025). or sooner prn      Please note that this dictation was created using voice recognition software. I have made every reasonable attempt to correct obvious errors, but I expect that there are errors of grammar and possibly content that I did not discover before finalizing the note.

## 2025-01-13 NOTE — TELEPHONE ENCOUNTER
Please notify patient that I forgot to mention at his appointment today Monday, January 13, 2025 that we should repeat his TB and hepatitis blood tests to help facilitate his continuation of his Taltz medication.  I have ordered the blood test in the computer, patient does not need to be fasting

## 2025-01-15 ENCOUNTER — APPOINTMENT (OUTPATIENT)
Dept: URBAN - METROPOLITAN AREA CLINIC 6 | Facility: CLINIC | Age: 65
Setting detail: DERMATOLOGY
End: 2025-01-15

## 2025-01-15 DIAGNOSIS — Z85.820 PERSONAL HISTORY OF MALIGNANT MELANOMA OF SKIN: ICD-10-CM

## 2025-01-15 DIAGNOSIS — L82.1 OTHER SEBORRHEIC KERATOSIS: ICD-10-CM

## 2025-01-15 DIAGNOSIS — L81.4 OTHER MELANIN HYPERPIGMENTATION: ICD-10-CM

## 2025-01-15 DIAGNOSIS — L57.0 ACTINIC KERATOSIS: ICD-10-CM

## 2025-01-15 DIAGNOSIS — Z71.89 OTHER SPECIFIED COUNSELING: ICD-10-CM

## 2025-01-15 DIAGNOSIS — D18.0 HEMANGIOMA: ICD-10-CM

## 2025-01-15 DIAGNOSIS — D22 MELANOCYTIC NEVI: ICD-10-CM

## 2025-01-15 PROBLEM — D22.61 MELANOCYTIC NEVI OF RIGHT UPPER LIMB, INCLUDING SHOULDER: Status: ACTIVE | Noted: 2025-01-15

## 2025-01-15 PROBLEM — D18.01 HEMANGIOMA OF SKIN AND SUBCUTANEOUS TISSUE: Status: ACTIVE | Noted: 2025-01-15

## 2025-01-15 PROBLEM — D22.71 MELANOCYTIC NEVI OF RIGHT LOWER LIMB, INCLUDING HIP: Status: ACTIVE | Noted: 2025-01-15

## 2025-01-15 PROBLEM — D22.62 MELANOCYTIC NEVI OF LEFT UPPER LIMB, INCLUDING SHOULDER: Status: ACTIVE | Noted: 2025-01-15

## 2025-01-15 PROBLEM — D22.72 MELANOCYTIC NEVI OF LEFT LOWER LIMB, INCLUDING HIP: Status: ACTIVE | Noted: 2025-01-15

## 2025-01-15 PROBLEM — D22.5 MELANOCYTIC NEVI OF TRUNK: Status: ACTIVE | Noted: 2025-01-15

## 2025-01-15 PROCEDURE — ? DIAGNOSIS COMMENT

## 2025-01-15 PROCEDURE — 99213 OFFICE O/P EST LOW 20 MIN: CPT | Mod: 25

## 2025-01-15 PROCEDURE — ? LIQUID NITROGEN

## 2025-01-15 PROCEDURE — ? COUNSELING

## 2025-01-15 PROCEDURE — 17000 DESTRUCT PREMALG LESION: CPT

## 2025-01-15 ASSESSMENT — LOCATION DETAILED DESCRIPTION DERM
LOCATION DETAILED: INFERIOR THORACIC SPINE
LOCATION DETAILED: LEFT VENTRAL DISTAL FOREARM
LOCATION DETAILED: LEFT SUPERIOR CENTRAL MALAR CHEEK
LOCATION DETAILED: LEFT INFERIOR UPPER BACK
LOCATION DETAILED: RIGHT ANTERIOR PROXIMAL THIGH
LOCATION DETAILED: LEFT DISTAL POSTERIOR THIGH
LOCATION DETAILED: LEFT ANTERIOR DISTAL UPPER ARM
LOCATION DETAILED: RIGHT DISTAL POSTERIOR THIGH
LOCATION DETAILED: RIGHT PROXIMAL CALF
LOCATION DETAILED: RIGHT SUPERIOR LATERAL MIDBACK
LOCATION DETAILED: LEFT CENTRAL MALAR CHEEK
LOCATION DETAILED: RIGHT ANTERIOR DISTAL UPPER ARM
LOCATION DETAILED: RIGHT PROXIMAL DORSAL FOREARM
LOCATION DETAILED: LEFT ANTERIOR PROXIMAL THIGH
LOCATION DETAILED: LEFT ANTERIOR SHOULDER
LOCATION DETAILED: LEFT PROXIMAL CALF
LOCATION DETAILED: LEFT PROXIMAL DORSAL FOREARM
LOCATION DETAILED: RIGHT MID-UPPER BACK
LOCATION DETAILED: RIGHT VENTRAL DISTAL FOREARM

## 2025-01-15 ASSESSMENT — LOCATION SIMPLE DESCRIPTION DERM
LOCATION SIMPLE: LEFT CHEEK
LOCATION SIMPLE: RIGHT THIGH
LOCATION SIMPLE: RIGHT CALF
LOCATION SIMPLE: RIGHT FOREARM
LOCATION SIMPLE: RIGHT LOWER BACK
LOCATION SIMPLE: LEFT SHOULDER
LOCATION SIMPLE: LEFT POSTERIOR THIGH
LOCATION SIMPLE: LEFT UPPER BACK
LOCATION SIMPLE: UPPER BACK
LOCATION SIMPLE: RIGHT UPPER ARM
LOCATION SIMPLE: LEFT FOREARM
LOCATION SIMPLE: LEFT UPPER ARM
LOCATION SIMPLE: LEFT THIGH
LOCATION SIMPLE: RIGHT POSTERIOR THIGH
LOCATION SIMPLE: RIGHT UPPER BACK
LOCATION SIMPLE: LEFT CALF

## 2025-01-15 ASSESSMENT — LOCATION ZONE DERM
LOCATION ZONE: ARM
LOCATION ZONE: FACE
LOCATION ZONE: TRUNK
LOCATION ZONE: LEG

## 2025-01-18 NOTE — PROGRESS NOTES
Chief Complaint- joint pain    Subjective:   Evens Garcia is a 56 y.o. male here today for follow up of rheumatological issues    This is a f/u for this Pt who was  was dx with PSA about 2013 was treated with Dr Alva who has now moved out of the area, initially treated with methotrexate and sulfasalazine for which patient felt no difference,we did x-rays of sacroiliac joints, feet and hands for which the feet x-rays do show erosive arthritis on the fifth MTP joint on the left foot. We have now been able to get Humira approved, and patient now about one month on Humira and states he is doing much better. Patient states his psoriasis is resolving, joint still really quite good, still has some pain in his left index PIP joint. Patient denies any side effects from Humira, denies any recurrent infections, denies any new rashes, denies any unexplained weight loss, denies any fevers of unknown etiology, denies any new neurological symptoms. No iritis/uveitis, no DVT/PE, no wieght loss, no FUO, no organ problems, no achilles tendon problems, no colitis, stiff low back, pt has diverticulitis, denies any diabetes, denies any thyroid problems  Patient currently works as a .      S/p MTX-nausea  S/p sulfasalazine-no benefit    RF neg 3/2013  TINA neg 3/2013  Quantiferon Gold neg 2/2017 [facility]  Hep B neg 2/2017 [facility]  Feet x-rays 1/2017-indicates erosive arthritis on the left fifth MTP joint  Hand x-rays 1/2017-indicates DJD  SI joint x-rays 1/2017-no pathology        Current medicines (including changes today)  Current Outpatient Prescriptions   Medication Sig Dispense Refill   • Adalimumab 40 MG/0.8ML Pen-injector Kit Inject 0.8 mL as instructed every 14 days. 6 Each 1   • naproxen (NAPROSYN) 500 MG Tab 1 tab po bid prn joint pain, take with food 180 Tab 0   • omeprazole (PRILOSEC) 20 MG delayed-release capsule Take 1 Cap by mouth every day. 90 Cap 3   • Naproxen Sodium (ALEVE) 220 MG Cap Take  by mouth.     •  Discharge to home with medications per MTM recommendations following up with your primary MD and GI as discussed   fluocinonide (LIDEX) 0.05 % Cream Apply thin amount bid to rash prn 60 g 1   • ibuprofen (MOTRIN) 200 MG TABS Take 200 mg by mouth every 6 hours as needed. Takes two prn        No current facility-administered medications for this visit.     He  has no past medical history on file.    ROS   Other than what is mentioned in HPI or physical exam, there is no history of headaches, double vision or blurred vision. No temporal tenderness or jaw claudication. No history of cataracts or glaucoma. No trouble swallowing difficulties or sore throats. No history of thyroid disease. No chest complaints including chest pain, cough or sputum production. No shortness of breath. No GI complaints including nausea, vomiting, change in bowel habits, or past peptic ulcer disease. No history of blood in the stools. No urinary complaints including dysuria or frequency. No history of rash including psoriasis. No history of alopecia, photosensitivity, oral ulcerations, Raynaud's phenomena, or swollen joints. No history of gout. No back complaints. No history of low blood counts.       Objective:     Blood pressure 118/70, pulse 90, temperature 37 °C (98.6 °F), resp. rate 14, weight 94.802 kg (209 lb), SpO2 94 %. Body mass index is 29.16 kg/(m^2).   Physical Exam:  Constitutional: Alert and oriented X3, no distress.Skin: Warm, dry, good turgor, patient has dry skin on his hands dorsal aspect as well as on the dorsal aspect but marsha psoriatic plaques are resolving. Eye: Equal, round and reactive, conjunctiva clear, lids normal EOM intact, patient wears glassesENMT: Lips without lesions, good dentition, no oropharyngeal ulcers, moist buccal mucosa, pinna without deformityNeck: Trachea midline, no masses, no thyromegaly.Lymph:  No cervical lymphadenopathy, no axillary lymphadenopathy, no supraclavicular lymphadenopathyRespiratory: Unlabored respiratory effort, lungs clear to auscultation, no wheezes, no ronchi.Cardiovascular: Normal S1, S2, no  murmur, no edema.Abdomen: Soft, non-tender, no masses, no hepatosplenomegaly.Psych: Alert and oriented x3, normal affect and mood.Neuro: Cranial nerves 2-12 are grossly intact, no loss of sensation LEExt:no joint laxity noted in bilateral arms, no joint laxity noted in bilateral legs, no evidence of effusions, no swan-neck or boutonniere deformities,no flexure contractures, shoulders full range of motion, knees with mild crepitus but no effusions, no Achilles tendon inflammation, no sausage digits noted neither on toes or fingers, occiput to wall is 0 cm, gait without antalgia without foot drop, Schoebers is 10-14 cm, patient has mild dystrophic nails on the toenails, tenderness palpation of the left index PIP joint but without marsha effusion.    Lab Results   Component Value Date/Time    SODIUM 140 03/15/2013 12:30 PM    POTASSIUM 3.8 03/15/2013 12:30 PM    CHLORIDE 102 03/15/2013 12:30 PM    CO2 21 03/15/2013 12:30 PM    GLUCOSE 109* 03/15/2013 12:30 PM    BUN 22 03/15/2013 12:30 PM    CREATININE 1.00 03/15/2013 12:30 PM    BUN-CREATININE RATIO 22* 03/15/2013 12:30 PM    GLOM FILT RATE, EST >59 11/19/2010 08:43 AM      Lab Results   Component Value Date/Time    WBC 9.2 03/15/2013 12:30 PM    RBC 5.33 03/15/2013 12:30 PM    HEMOGLOBIN 15.1 03/15/2013 12:30 PM    HEMATOCRIT 44.4 03/15/2013 12:30 PM    MCV 83 03/15/2013 12:30 PM    MCH 28.3 03/15/2013 12:30 PM    MCHC 34.0 03/15/2013 12:30 PM    NEUTROPHILS-POLYS 72 03/15/2013 12:30 PM    LYMPHOCYTES 20 03/15/2013 12:30 PM    MONOCYTES 6 03/15/2013 12:30 PM    EOSINOPHILS 2 03/15/2013 12:30 PM    BASOPHILS 0 03/15/2013 12:30 PM      Lab Results   Component Value Date/Time    CALCIUM 9.5 03/15/2013 12:30 PM    AST(SGOT) 33 03/15/2013 12:30 PM    ALT(SGPT) 39 03/15/2013 12:30 PM    ALKALINE PHOSPHATASE 119 03/15/2013 12:30 PM    TOTAL BILIRUBIN 0.4 03/15/2013 12:30 PM    ALBUMIN 4.4 03/15/2013 12:30 PM    TOTAL PROTEIN 6.9 03/15/2013 12:30 PM     Lab Results    Component Value Date/Time    RHEUMATOID FACTOR -NEPH- 8.8 03/15/2013 12:30 PM     Lab Results   Component Value Date/Time    ANTINUCLEAR ANTIBODY DIRECT Negative 03/15/2013 12:30 PM     Lab Results   Component Value Date/Time    SED RATE MARILYNREN 19 03/15/2013 12:30 PM     Results for orders placed during the hospital encounter of 01/11/17   DX-JOINT SURVEY-HANDS SINGLE VIEW    Impression 1.  No acute fracture or bone erosion.    2.  Mild osteoarthritis.     Results for orders placed during the hospital encounter of 01/11/17   DX-JOINT SURVEY-FEET SINGLE VIEW    Impression 1.  No acute fracture or dislocation.    2.  Some soft tissue swelling is noted around the MTP joint of the fifth digit of the left foot and subtle bone erosions in the distal metatarsal are identified. Findings could be due to gout. Inflammatory arthropathy is also possible.     Results for orders placed during the hospital encounter of 12/21/07   DX-ANKLE 3+ VIEWS    Impression IMPRESSION:    SOFT TISSUE SWELLING AND EVIDENCE OF A JOINT EFFUSION WITHOUT EVIDENCE OF   ACUTE FRACTURE.      [email or web address]        Read By SABA KAUR MD on Dec 21 2007  3:35PM  : LVD Transcription Date: Dec 22 2007  6:29PM  THIS DOCUMENT HAS BEEN ELECTRONICALLY SIGNED BY: SABA KAUR MD on   Dec 24 2007  9:07AM     Results for orders placed during the hospital encounter of 01/11/17   DX-SACROILIAC JOINTS 3+    Impression Negative exam of the sacroiliac joints.     Results for orders placed in visit on 12/17/12   MR-KNEE-W/O    Impression 1. Advanced patellofemoral compartment osteoarthritis    2. Small medial meniscal tear the junction of the posterior horn and body.    3. Truncated medial meniscal free edge suggesting degeneration or from prior meniscectomy.    4. Very small tear of the lateral meniscus at the junction of posterior and body.    5. Joint effusion     Assessment and Plan:     1. Psoriatic arthritis (CMS-HCC)  Continue Humira 40  mg subcutaneous every 2 weeks  We'll do labs in about 2 months, patient follow-up at that time.  - COMP METABOLIC PANEL; Future  - CBC WITH DIFFERENTIAL; Future  - WESTERGREN SED RATE; Future    2. Elevated LFTs  Patient does drink wine daily at bedtime, will have to continue to monitor liver functions    3. Gastroesophageal reflux disease with esophagitis  May impact the type of medications we can use for this patient's arthritis. We will have to keep this under advisement.    4. Elevated blood pressure  May impact the type of medications we can use for this patient's arthritis. We will have to keep this under advisement.    Followup: Return in about 3 months (around 7/4/2017). or sooner prn    Patient was seen 30 minutes face-to-face of which more than 50% of the time was spent counseling the patient (excluding time for procedures)  regarding  rheumatological condition and care. Therapy was discussed in detail.    Please note that this dictation was created using voice recognition software. I have made every reasonable attempt to correct obvious errors, but I expect that there are errors of grammar and possibly content that I did not discover before finalizing the note.

## 2025-01-22 ENCOUNTER — HOSPITAL ENCOUNTER (OUTPATIENT)
Dept: LAB | Facility: MEDICAL CENTER | Age: 65
End: 2025-01-22
Attending: INTERNAL MEDICINE
Payer: COMMERCIAL

## 2025-01-22 DIAGNOSIS — L40.50 PSORIATIC ARTHRITIS (HCC): ICD-10-CM

## 2025-01-22 DIAGNOSIS — Z51.81 MEDICATION MONITORING ENCOUNTER: ICD-10-CM

## 2025-01-22 LAB
HBV CORE IGM SER QL: NORMAL
HBV SURFACE AG SER QL: NORMAL
HCV AB SER QL: NORMAL

## 2025-01-22 PROCEDURE — 86480 TB TEST CELL IMMUN MEASURE: CPT

## 2025-01-22 PROCEDURE — 87340 HEPATITIS B SURFACE AG IA: CPT

## 2025-01-22 PROCEDURE — 36415 COLL VENOUS BLD VENIPUNCTURE: CPT

## 2025-01-22 PROCEDURE — 86803 HEPATITIS C AB TEST: CPT

## 2025-01-22 PROCEDURE — 86705 HEP B CORE ANTIBODY IGM: CPT

## 2025-01-23 ENCOUNTER — APPOINTMENT (OUTPATIENT)
Dept: LAB | Facility: MEDICAL CENTER | Age: 65
End: 2025-01-23
Payer: COMMERCIAL

## 2025-01-24 LAB
GAMMA INTERFERON BACKGROUND BLD IA-ACNC: 0.04 IU/ML
M TB IFN-G BLD-IMP: NEGATIVE
M TB IFN-G CD4+ BCKGRND COR BLD-ACNC: 0.01 IU/ML
MITOGEN IGNF BCKGRD COR BLD-ACNC: >10 IU/ML
QFT TB2 - NIL TBQ2: 0.02 IU/ML

## 2025-02-11 ENCOUNTER — TELEMEDICINE (OUTPATIENT)
Dept: RHEUMATOLOGY | Facility: MEDICAL CENTER | Age: 65
End: 2025-02-11
Attending: STUDENT IN AN ORGANIZED HEALTH CARE EDUCATION/TRAINING PROGRAM
Payer: COMMERCIAL

## 2025-02-11 VITALS — WEIGHT: 197 LBS | BODY MASS INDEX: 27.58 KG/M2 | HEIGHT: 71 IN

## 2025-02-11 DIAGNOSIS — L40.0 PLAQUE PSORIASIS: ICD-10-CM

## 2025-02-11 DIAGNOSIS — D84.9 IMMUNOSUPPRESSED STATUS (HCC): ICD-10-CM

## 2025-02-11 DIAGNOSIS — L40.50 PSORIATIC ARTHRITIS (HCC): ICD-10-CM

## 2025-02-11 PROBLEM — E66.9 OBESITY (BMI 30-39.9): Status: RESOLVED | Noted: 2020-07-10 | Resolved: 2025-02-11

## 2025-02-11 PROCEDURE — 99214 OFFICE O/P EST MOD 30 MIN: CPT | Mod: 95 | Performed by: STUDENT IN AN ORGANIZED HEALTH CARE EDUCATION/TRAINING PROGRAM

## 2025-02-11 ASSESSMENT — RHEUMATOLOGY NEW PATIENT QUESTIONNAIRE
MARK ALL THE AREAS OF PAIN: 114505180
RATE_1TO10: 1

## 2025-02-11 ASSESSMENT — FIBROSIS 4 INDEX: FIB4 SCORE: 1.93

## 2025-02-11 NOTE — PROGRESS NOTES
[facility] RHEUMATOLOGY  [address], [address], [place] [address]  Phone: [phone] ? Fax: [phone]  [facility]/Health-Services/Rheumatology    VIRTUAL VIDEO NEW CONSULT VISIT NOTE    This evaluation was conducted via Microsoft Office Teams using secure and encrypted videoconferencing technology for virtual visit. The patient was in their home in the [facility]. The patient's identity was confirmed and verbal consent was obtained for this encounter.         Subjective     DATE OF SERVICE: 02/11/2025    PRIMARY CARE PROVIDER:  Pcp Pt States None  No address on file    PATIENT IDENTIFICATION:  Evens Garcia  [address] [place] [address]    YOB: 1960    MEDICAL RECORD NUMBER: [id number]         CHIEF COMPLAINT:   Chief Complaint   Patient presents with    New Patient     Psoriatic arthritis (HCC)       RHEUMATOLOGIC HISTORY:  Evens Garcia is a 64 y.o. male with pertinent history notable for psoriatic arthritis with plaque psoriasis since 2010, gastric reflux, hyperlipidemia and hepatic steatosis (resolved after significant weight loss). Previously under the care of a [facility] rheumatologist who has retired (Dr. Henny Yuan last visit on 1/13/25 with completely normal physical exam), he presents to establish care for continued evaluation and management of his rheumatic disease. Reports generally doing quite well with no significant musculoskeletal or mucocutaneous symptoms and feels that his current regimen of Taltz remains very helpful. 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 Reports supplemental aspects of his medical history as noted on the questionnaire below or scanned under media tab    Pertinent treatments: Methotrexate (discontinued due to elevated liver enzymes), sulfasalazine (ineffective), Humira (ineffective), Cosentyx (most effective  but discontinued due to not approved by his aviation insurance), Taltz 160>80 mg SC every 4 weeks (9/2022-present, effective).    Pertinent positive labs: Positive Ro52 with otherwise negative Myositis Panel (in 1/2020 at [facility]).    Pertinent negative labs: Negative TINA EIA and RF (in 3/2013), HFE mutation (in 1/2024), HBV, HCV, QTB, ESR, CBC, eGFR, creatinine, and LFTs (in 1/2025).    Pertinent XR imaging: Hands (in 1/2017) with mild osteoarthritis of the interphalangeal joints, but no evidence of inflammatory arthropathy. Feet (in 1/2017) with some soft tissue swelling around the left fifth MTP joint and subtle marginal erosions at the distal metatarsal of the left foot. SI joints (in 1/2017) with no evidence of inflammatory or degenerative arthropathy.      INTERVAL HISTORY:  Reports interval history as noted on the questionnaire below or scanned under media tab.  [facility] New Patient History Form    2/11/2025 10:33 AM PST - Filed by Patient   Demographic Information   Marital Status:    Occupation:    Highest Level of Education: [employer]   MAIN REASON FOR VISIT Virtual Exam   HISTORY OF PRESENT ILLNESS   Date of symptom onset: 2010   Preceding incident/ailment:    Describe/list your symptoms: Doing well   Exacerbating factors:    Alleviating factors:    Helpful medications: Taltz 80mg   Ineffective medications:    Severity of pain (scale of 1-10): 1   Personal/emotional stressors:    [place] All The Areas Of Pain    REVIEW OF SYMPTOMS    General   Fevers    Chills    Night sweats    Malaise    Fatigue    Unintentional weight loss    Musculoskeletal   Joint pain    Morning stiffness duration    Morning stiffness characteristic    Joint swelling    Joint instability    Tendon pain    Muscle pain    Body aches    Dermatologic   Hair loss with bald spots    Hair shedding    Skin thickening    Skin plaques    Sunlight-induced skin rash    Cold-induced color changes (white, purple, red  on rewarming)    Neurologic/Psychiatric   Weakness    Spasms    Tingling    Burning    Numbness    Insomnia    Anxiety    Depression    Head/Eyes   Headaches    Temple pain    Dizziness    Dry eyes    Eye pain    Eye redness    Blurry vision    Vision loss    Ears/Nose   Ear pain    Ringing in ears    Vertigo    Hearing loss    Nasal ulcers    Nosebleeds    Sinus pain    Nasal congestion    Snoring    Mouth/Throat   Oral ulcers    Bleeding gums    Dry mouth    Cavities    Sore throat    Sticking in throat    Difficulty speaking    Neck/Lymphatics   Thyroid pain    Thyroid swelling    Lymph node swelling    Cardiac/Respiratory   Chest pain with breathing    Dry cough    Cough with bloody phlegm    Shortness of breath    Fast heartbeats    Irregular heartbeats    Gastrointestinal   Nausea    Vomiting    Difficulty swallowing    Heartburn    Abdominal pain    Bloody stool    Mucus stool    Genitourinary   Pelvic pain    Genital ulcers    Abnormal discharge    Burning urination    Frothy urine    Blood in urine    MEDICAL/PERSONAL HISTORY DETAILS   Current Medical Problems: Basically, I have no issues. Psoriatic Arthritis is well controlled.  I have no complaints   Past/Resolved Medical Problems:    Surgeries/Procedures (include month/year):    Prescription/Over-The-Counter/Herbal Medications:    Medication/Food/Material Allergies (include reactions):    Immunizations (include month/year)    Alcohol/Tobacco/Recreational Drugs:    Family Medical History (father, mother, siblings only):        REVIEW OF SYSTEMS:  Except as noted in the history above, relevant review of systems with emphasis on autoimmune rheumatic diseases was otherwise negative.      CURRENT PROBLEM LIST:  Patient Active Problem List    Diagnosis Date Noted    Fatty liver 07/10/2018    Psoriatic arthritis (HCC) 01/15/2016    GERD (gastroesophageal reflux disease) 01/15/2016    Hyperlipidemia 01/15/2016    Family history of diabetes mellitus 01/15/2016     Family history of prostate cancer in father 01/15/2016    Plaque psoriasis 01/15/2016    Immunosuppressed status (HCC) 01/15/2016       PAST MEDICAL HISTORY:  Past Medical History:   Diagnosis Date    Obesity (BMI 30-39.9) 07/10/2020       PAST SURGICAL HISTORY:  Past Surgical History:   Procedure Laterality Date    KNEE ARTHROSCOPY  1/27/2011    Performed by DANY STUART at [facility] ORS    MENISCECTOMY  1/27/2011    Performed by DANY STUART at [facility] ORS    MENISCECTOMY, KNEE, MEDIAL  1/27/2011    Performed by DANY STUART at [facility] ORS    OTHER ORTHOPEDIC SURGERY  1996    R knee patellar fracture    VASECTOMY  1992    TONSILLECTOMY AND ADENOIDECTOMY  1965       SOCIAL HISTORY:  Social History     Socioeconomic History    Marital status:      Spouse name: Not on file    Number of children: Not on file    Years of education: Not on file    Highest education level: Not on file   Occupational History    Not on file   Tobacco Use    Smoking status: Never    Smokeless tobacco: Never   Vaping Use    Vaping status: Never Used   Substance and Sexual Activity    Alcohol use: Yes     Alcohol/week: 1.0 oz     Types: 2 Glasses of wine per week     Comment: Socially.     Drug use: No    Sexual activity: Yes     Partners: Female     Comment: .    Other Topics Concern    Not on file   Social History Narrative    Not on file     Social Drivers of Health     Financial Resource Strain: Not on File (8/24/2019)    Received from [facility]    Financial Resource Strain     Financial Resource Strain: 0   Food Insecurity: Not on File (8/24/2019)    Received from [facility]    Food Insecurity     Food: 0   Transportation Needs: Not on File (8/24/2019)    Received from [facility]    Transportation Needs     Transportation: 0   Physical Activity: Not on File (8/24/2019)    Received from [facility]    Physical Activity     Physical Activity: 0   Stress: Not on File  "(2019)    Received from [facility]    Stress     Stress: 0   Social Connections: Not on File (2019)    Received from [facility]    Social Connections     Social Connections and Isolation: 0   Intimate Partner Violence: Not on file   Housing Stability: Not on File (2019)    Received from [facility]    Housing Stability     Housin       FAMILY HISTORY:  Family History   Problem Relation Age of Onset    Cancer Father         prostate    Alcohol/Drug Maternal Grandfather     Cancer Paternal Grandmother         colon    Cancer Paternal Grandfather         prostate    No Known Problems Son     No Known Problems Son        MEDICATIONS:  Current Outpatient Medications   Medication Sig    Ixekizumab (TALTZ) 80 MG/ML Solution Auto-injector INJECT 80 MG ( 1 PEN ) UNDER THE SKIN EVERY 4 WEEKS    Multiple Vitamins-Minerals (OCUVITE ADULT 50+ PO) Take  by mouth.    VITAMIN D PO Take  by mouth.    Ascorbic Acid (VITAMIN C PO) Take  by mouth.    VITAMIN E PO Take  by mouth.    ibuprofen (MOTRIN) 200 MG TABS Take 200 mg by mouth every 6 hours as needed. Takes two prn       ALLERGIES:   Allergies   Allergen Reactions    Nkda [No Known Drug Allergy]        IMMUNIZATIONS:  Immunization History   Administered Date(s) Administered    Influenza Seasonal Injectable - Historical Data 10/18/2011    Influenza Vaccine H1N1 - HISTORICAL DATA 10/31/2009    Influenza Vaccine Quad Inj (Preserved) 10/02/2014    Influenza split virus trivalent (PF) 10/20/2010    Influenza, unspecified formulation 10/15/2019    MODERNA SARS-COV-2 VACCINE (12+) 2020, 2021, 2022            Objective     Vital Signs: Ht 1.803 m (5' 11\")   Wt 89.4 kg (197 lb) Body mass index is 27.48 kg/m².    General: Appears well and comfortable  Eyes: Visually unremarkable  ENT: Visually unremarkable  Head/Neck: Visually unremarkable  Cardiovascular: Not applicable  Respiratory: Breathing quiet and unlabored  Gastrointestinal: Not " applicable  Integumentary: Visually unremarkable  Musculoskeletal: Visually unremarkable with normal range of motion of axial and peripheral joints  Neurologic: Visually unremarkable  Psychiatric: Mood and affect appropriate      LABORATORY RESULTS REVIEWED AND INTERPRETED:  Lab Results   Component Value Date/Time    SEDRATEWES 3 01/06/2025 12:58 PM     Lab Results   Component Value Date/Time    RHEUMFACTN 8.8 03/15/2013 12:30 PM     Lab Results   Component Value Date/Time    ANADIRECT Negative 03/15/2013 12:30 PM     Lab Results   Component Value Date/Time    TSH 1.830 11/19/2010 08:43 AM    TSHULTRASEN 1.450 04/08/2024 03:29 PM     Lab Results   Component Value Date/Time    CPKTOTAL 582 (H) 07/27/2020 06:58 AM     Lab Results   Component Value Date/Time    FERRITIN 500.0 (H) 01/29/2024 03:37 PM    IRON 105 01/29/2024 03:37 PM     Lab Results   Component Value Date/Time    WBC 6.5 01/06/2025 12:58 PM    WBC 9.2 03/15/2013 12:30 PM    RBC 5.59 01/06/2025 12:58 PM    RBC 5.33 03/15/2013 12:30 PM    HEMOGLOBIN 17.1 01/06/2025 12:58 PM    HEMATOCRIT 50.9 01/06/2025 12:58 PM    MCV 91.1 01/06/2025 12:58 PM    MCV 83 03/15/2013 12:30 PM    MCH 30.6 01/06/2025 12:58 PM    MCH 28.3 03/15/2013 12:30 PM    MCHC 33.6 01/06/2025 12:58 PM    RDW 42.5 01/06/2025 12:58 PM    RDW 14.0 03/15/2013 12:30 PM    PLATELETCT 198 01/06/2025 12:58 PM    MPV 9.9 01/06/2025 12:58 PM    NEUTS 3.91 01/06/2025 12:58 PM    NEUTS 6.6 03/15/2013 12:30 PM    LYMPHOCYTES 27.40 01/06/2025 12:58 PM    MONOCYTES 7.70 01/06/2025 12:58 PM    EOSINOPHILS 4.00 01/06/2025 12:58 PM    BASOPHILS 0.80 01/06/2025 12:58 PM     Lab Results   Component Value Date/Time    ASTSGOT 41 01/06/2025 12:58 PM    ALTSGPT 47 01/06/2025 12:58 PM    ALKPHOSPHAT 86 01/06/2025 12:58 PM    TBILIRUBIN 0.5 01/06/2025 12:58 PM    TOTPROTEIN 7.1 01/06/2025 12:58 PM    ALBUMIN 4.4 01/06/2025 12:58 PM     Lab Results   Component Value Date/Time    SODIUM 138 01/06/2025 12:58 PM     POTASSIUM 4.1 01/06/2025 12:58 PM    CHLORIDE 103 01/06/2025 12:58 PM    CO2 22 01/06/2025 12:58 PM    GLUCOSE 91 01/06/2025 12:58 PM    BUN 14 01/06/2025 12:58 PM    CREATININE 0.92 01/06/2025 12:58 PM    CREATININE 1.00 03/15/2013 12:30 PM    BUNCREATRAT 22 (H) 03/15/2013 12:30 PM    GLOMRATE >59 11/19/2010 08:43 AM    CALCIUM 8.9 01/06/2025 12:58 PM     Lab Results   Component Value Date/Time    HEPBSAG Non-Reactive 01/22/2025 02:28 PM    HEPBCORIGM Non-Reactive 01/22/2025 02:28 PM    HEPCAB Non-Reactive 01/22/2025 02:28 PM     Lab Results   Component Value Date/Time    CHOLSTRLTOT 184 04/08/2024 03:29 PM     (H) 04/08/2024 03:29 PM    HDL 50 04/08/2024 03:29 PM    TRIGLYCERIDE 58 04/08/2024 03:29 PM    HBA1C 5.6 04/08/2024 03:29 PM       RADIOLOGY RESULTS REVIEWED AND INTERPRETED:  Results for orders placed during the hospital encounter of 12/21/07    DX-ANKLE 3+ VIEWS    Impression  IMPRESSION:    SOFT TISSUE SWELLING AND EVIDENCE OF A JOINT EFFUSION WITHOUT EVIDENCE OF  ACUTE FRACTURE.    Results for orders placed during the hospital encounter of 01/11/17    DX-JOINT SURVEY-FEET SINGLE VIEW    Impression  1.  No acute fracture or dislocation.    2.  Some soft tissue swelling is noted around the MTP joint of the fifth digit of the left foot and subtle bone erosions in the distal metatarsal are identified. Findings could be due to gout. Inflammatory arthropathy is also possible.    Results for orders placed during the hospital encounter of 01/11/17    DX-JOINT SURVEY-HANDS SINGLE VIEW    Impression  1.  No acute fracture or bone erosion.    2.  Mild osteoarthritis.    Results for orders placed during the hospital encounter of 01/11/17    DX-SACROILIAC JOINTS 3+    Impression  Negative exam of the sacroiliac joints.    Results for orders placed in visit on 12/17/12    MR-KNEE-W/O    Impression  1. Advanced patellofemoral compartment osteoarthritis    2. Small medial meniscal tear the junction of the  posterior horn and body.    3. Truncated medial meniscal free edge suggesting degeneration or from prior meniscectomy.    4. Very small tear of the lateral meniscus at the junction of posterior and body.    5. Joint effusion      All relevant laboratory and imaging results reported on this note were reviewed and interpreted by me.         Assessment & Plan     Evens Garcia is a 64 y.o. male with history as noted above whose presentation merits the following clinical impressions and recommendations:    1. Psoriatic arthritis (HCC)  Clinically and serologically quiescent in remission with no evidence of disease activity on the current regimen of Taltz standard dosing, so there is no need to continue on this level of immunosuppression for maintenance. After weighing the risks and benefits, it is reasonable at this time to de-escalate his immunosuppression by increasing the frequency between injections. Given the unlikely potential for disease recurrence in this case, it is reasonable to occasionally reassess markers of disease activity and risk stratification to gauge overall trajectory.  - CRP QUANTITIVE (NON-CARDIAC); Future  - Sed Rate; Future  - Decrease Taltz 80 mg SC from every 4 weeks to every 6-8 weeks  - Medically cleared with no rheumatologic contraindication to continue operating as a     2. Plaque psoriasis  Clinically and serologically quiescent in remission with no evidence of disease activity on the current regimen of Taltz, hence the de-escalation of immunosuppression as above.  - CRP QUANTITIVE (NON-CARDIAC); Future  - Sed Rate; Future    3. Immunosuppressed status (HCC)  High risk immunosuppressant use requiring routine monitoring labs prior to every visit to assess for possible side effects including but not limited to myelotoxicity, hepatotoxicity, and opportunistic infections. Presently with no history, physical, or laboratory evidence to suggest increased risk of significant  adverse drug effects, so no need for frequent lab testing.  - CBC WITH DIFFERENTIAL; Future  - Comp Metabolic Panel; Future  - Need to ascertain age-appropriate vaccines in addition to the ones listed above under immunizations      The above assessment and plan were discussed with the patient who acknowledged understanding of the plan.    FOLLOW-UP: Return in about 11 months (around 1/11/2026) for Short.         Thank you for the opportunity to participate in the care of Evens Garcia.    Mayo Quiles MD, MS, FACR  Rheumatologist, [facility] Rheumatology, [facility]   of Clinical Medicine, Department of Internal Medicine  [facility] [employer]

## 2025-02-11 NOTE — PATIENT INSTRUCTIONS
[facility] RHEUMATOLOGY AFTER VISIT GUIDE    Below are important guidelines to help you navigate your health care needs and assist us in caring for you safely and effectively. We encourage you to carefully read and understand this information and adhere to them accordingly.    embraase Messaging and Phone Calls:  Diagnosis and Treatment - For a detailed explanation of your condition and treatment plan from today's visit, refer to the visit note on embraase via the following steps:  Log in to embraase and click on “Visits” at the top.  Scroll down to “Past Visits” under Appointments.  Click on “View Notes” under the appropriate visit date.  Questions or Concerns - MyChart messaging is for non-urgent matters that do not require immediate attention and should be brief with no more than two questions or concerns. If you have multiple questions or concerns, we ask that you schedule an appointment to have them properly addressed.  Response to Messages - embraase messages are addressed throughout the week depending on clinical availability, so we ask that you allow up to one week for a response.  Phone Calls and Voicemails - Phone calls and voicemail messages are reserved for time-sensitive matters that cannot wait to be addressed via embraase. We ask that you refrain from calling the office multiple times or leaving multiple voicemails regarding the same issue as doing so may lead to delays in response time.  Urgent Issues - For urgent medical matters or medical emergencies that cannot wait, you are advised to go to your nearest Urgent Care or Emergency Department for immediate attention.    Laboratory Tests and Imaging Studies:  Future Lab and Imaging Orders - We ask that you get your lab tests and imaging studies done no later than one week before your follow-up visit unless instructed otherwise.  Results Communication - You may see some test results marked as “abnormal” that are not necessarily significant or concerning. If  there are significant abnormalities on your test results that warrant further action, you will be notified via MyChart or phone call, otherwise they will be addressed at your follow-up visit.    Prescriptions and Refill Requests:  General Prescriptions (e.g. prednisone, hydroxychloroquine, leflunomide, methotrexate, etc.) - These are sent to Retail Pharmacies, so all refill requests of these medications should be directed to your local pharmacy.  Specialty Prescriptions (e.g. Enbrel, Humira, Cosentyx, Xeljanz, etc.) - These are sent to Specialty Pharmacies, so all refill requests of these medications should be directed to your designated specialty pharmacy.  Infusion Prescriptions (e.g. Remicade, Simponi Aria, Rituxan, Saphnelo, etc.) - These are sent to Outpatient Infusion Centers, so all scheduling requests of these medications should be directed to your local infusion center.    Medication Risks and Adverse Effects:  Immunosuppressed Status - Steroids and antirheumatic drugs are immunosuppressants, so they increase the risk of infections and can have side effects on various organ systems in your body, though most of them are uncommon.  Potential Side Effects - Be sure to read the drug package inserts to learn about the potential side effects of your medications before you start taking them and take them exactly as prescribed unless instructed otherwise.  In Case of Side Effects - If you experience any significant side effects, stop taking the medication immediately and promptly notify the prescriber. Depending on the severity of the side effects, consider going to an Urgent Care or Emergency Department for immediate attention.    Immunizations and Health Screening:  Vaccinations - If you are on immunosuppressive therapy, it is important that you are up to date on age-appropriate immunizations, particularly shingles and pneumonia vaccines, which you can request from your primary care provider or from us at your  next appointment.  Screening Tests - It is also important that you are up to date on age-appropriate screening tests, such as pap smear, mammography, and colonoscopy, which you can request from your primary care provider.    Educational and Supportive Resources:  [facility] ([email or web address]) - Visit our website to learn more about your condition and other rheumatic diseases, and gain access to many helpful resources for them.  Disposal of Old Medications (www.itgre.gov/everyday-takeback-day) - Visit the Drug Enforcement Administration website to find a nearby location where you can properly dispose of old medications you no longer need.  Disposal of Used Currie (www.safeneedledisposal.org) - Visit the Safe Needle Disposal Organization website to find a nearby location where you can properly dispose of used needles from your injectable medications.

## 2025-03-10 ENCOUNTER — TELEPHONE (OUTPATIENT)
Dept: RHEUMATOLOGY | Facility: MEDICAL CENTER | Age: 65
End: 2025-03-10
Payer: COMMERCIAL

## 2025-03-10 NOTE — TELEPHONE ENCOUNTER
Prior Authorization for Taltz 80MG/ML auto-injectors has been approved for a quantity of 1 , day supply 28    Prior Authorization reference number: [id number]  Insurance: [employer]  Effective dates: 03/10/2025 to 03/10/2026

## 2025-03-10 NOTE — TELEPHONE ENCOUNTER
Prior Authorization for Taltz 80MG/ML auto-injectors (Quantity: 1, Days: 28) has been submitted via Cover My Meds: Key [id number])    Insurance: [employer]    Will follow up in 24-48 business hours.

## 2025-04-01 ENCOUNTER — PATIENT MESSAGE (OUTPATIENT)
Dept: RHEUMATOLOGY | Facility: MEDICAL CENTER | Age: 65
End: 2025-04-01
Payer: COMMERCIAL

## 2025-04-01 DIAGNOSIS — L40.50 PSORIATIC ARTHRITIS (HCC): ICD-10-CM

## 2025-04-01 DIAGNOSIS — L40.0 PLAQUE PSORIASIS: ICD-10-CM

## 2025-04-01 NOTE — PATIENT COMMUNICATION
Received request via: Patient    Was the patient seen in the last year in this department? Yes    Does the patient have an active prescription (recently filled or refills available) for medication(s) requested? No    Pharmacy Name: [facility]    Does the patient have [facility] and need 100-day supply? (This applies to ALL medications) Patient does not have [facility]

## 2025-04-03 RX ORDER — IXEKIZUMAB 80 MG/ML
1 INJECTION, SOLUTION SUBCUTANEOUS
Qty: 3 ML | Refills: 1 | OUTPATIENT
Start: 2025-04-03

## 2025-04-03 RX ORDER — IXEKIZUMAB 80 MG/ML
80 INJECTION, SOLUTION SUBCUTANEOUS
Qty: 3 ML | Refills: 1 | Status: SHIPPED | OUTPATIENT
Start: 2025-04-03

## 2025-06-26 ENCOUNTER — OFFICE VISIT (OUTPATIENT)
Dept: URGENT CARE | Facility: CLINIC | Age: 65
End: 2025-06-26
Payer: COMMERCIAL

## 2025-06-26 VITALS
TEMPERATURE: 98.7 F | HEART RATE: 92 BPM | DIASTOLIC BLOOD PRESSURE: 72 MMHG | RESPIRATION RATE: 16 BRPM | WEIGHT: 210.8 LBS | HEIGHT: 72 IN | BODY MASS INDEX: 28.55 KG/M2 | SYSTOLIC BLOOD PRESSURE: 120 MMHG | OXYGEN SATURATION: 95 %

## 2025-06-26 DIAGNOSIS — H60.331 ACUTE SWIMMER'S EAR OF RIGHT SIDE: Primary | ICD-10-CM

## 2025-06-26 PROCEDURE — 3074F SYST BP LT 130 MM HG: CPT | Performed by: NURSE PRACTITIONER

## 2025-06-26 PROCEDURE — 3078F DIAST BP <80 MM HG: CPT | Performed by: NURSE PRACTITIONER

## 2025-06-26 PROCEDURE — 99213 OFFICE O/P EST LOW 20 MIN: CPT | Performed by: NURSE PRACTITIONER

## 2025-06-26 RX ORDER — PREDNISONE 10 MG/1
20 TABLET ORAL DAILY
Qty: 10 TABLET | Refills: 0 | Status: SHIPPED | OUTPATIENT
Start: 2025-06-26 | End: 2025-07-01

## 2025-06-26 RX ORDER — CIPROFLOXACIN AND DEXAMETHASONE 3; 1 MG/ML; MG/ML
4 SUSPENSION/ DROPS AURICULAR (OTIC) 2 TIMES DAILY
Qty: 2.8 ML | Refills: 0 | Status: SHIPPED | OUTPATIENT
Start: 2025-06-26 | End: 2025-07-03

## 2025-06-26 RX ORDER — LORATADINE AND PSEUDOEPHEDRINE SULFATE 5; 120 MG/1; MG/1
1 TABLET, EXTENDED RELEASE ORAL 2 TIMES DAILY
Qty: 14 TABLET | Refills: 0 | Status: SHIPPED | OUTPATIENT
Start: 2025-06-26 | End: 2025-07-03

## 2025-06-26 ASSESSMENT — ENCOUNTER SYMPTOMS
VOMITING: 0
HEADACHES: 0
NECK PAIN: 0
COUGH: 0
RHINORRHEA: 0
SORE THROAT: 0

## 2025-06-26 ASSESSMENT — FIBROSIS 4 INDEX: FIB4 SCORE: 1.96

## 2025-06-26 NOTE — PATIENT INSTRUCTIONS
Otitis Externa    Otitis externa is an infection of the outer ear canal. The outer ear canal is the area between the outside of the ear and the eardrum. Otitis externa is sometimes called swimmer's ear.  What are the causes?  Common causes of this condition include:  Swimming in dirty water.  Moisture in the ear.  An injury to the inside of the ear.  An object stuck in the ear.  A cut or scrape on the outside of the ear or in the ear canal.  What increases the risk?  You are more likely to develop this condition if you go swimming often.  What are the signs or symptoms?  The first symptom of this condition is often itching in the ear. Later symptoms of the condition include:  Swelling of the ear.  Redness in the ear.  Ear pain. The pain may get worse when you pull on your ear.  Pus coming from the ear.  How is this diagnosed?  This condition may be diagnosed by examining the ear and testing fluid from the ear for bacteria and funguses.  How is this treated?  This condition may be treated with:  Antibiotic ear drops. These are often given for 10-14 days.  Medicines to reduce itching and swelling.  Follow these instructions at home:  If you were prescribed antibiotic ear drops, use them as told by your health care provider. Do not stop using the antibiotic even if you start to feel better.  Take over-the-counter and prescription medicines only as told by your health care provider.  Avoid getting water in your ears as told by your health care provider. This may include avoiding swimming or water sports for a few days.  Keep all follow-up visits. This is important.  How is this prevented?  Keep your ears dry. Use the corner of a towel to dry your ears after you swim or bathe.  Avoid scratching or putting things in your ear. Doing these things can damage the ear canal or remove the protective wax that lines it, which makes it easier for bacteria and funguses to grow.  Avoid swimming in lakes, polluted water, or swimming  pools that may not have enough chlorine.  Contact a health care provider if:  You have a fever.  Your ear is still red, swollen, painful, or draining pus after 3 days.  Your redness, swelling, or pain gets worse.  You have a severe headache.  Get help right away if:  You have redness, swelling, and pain or tenderness in the area behind your ear.  Summary  Otitis externa is an infection of the outer ear canal.  Common causes include swimming in dirty water, moisture in the ear, or a cut or scrape in the ear.  Symptoms include pain, redness, and swelling of the ear canal.  If you were prescribed antibiotic ear drops, use them as told by your health care provider. Do not stop using the antibiotic even if you start to feel better.  This information is not intended to replace advice given to you by your health care provider. Make sure you discuss any questions you have with your health care provider.  Document Revised: 03/02/2022 Document Reviewed: 03/02/2022  Elsevier Patient Education © 2023 [employer]

## 2025-06-26 NOTE — PROGRESS NOTES
Patient/parent/guardian has consented to treatment and for use of patient information for treatment and billing purposes.  Subjective:   Evens Garcia  is a 65 y.o. male who presents for Otalgia (Right ear plugged feels soar/tender and shooting pain when opening mouth. X6 days)       Otalgia   There is pain in the right ear. This is a new problem. The current episode started in the past 7 days. The problem occurs constantly. The problem has been waxing and waning. There has been no fever. The pain is mild. Associated symptoms include ear discharge and hearing loss. Pertinent negatives include no coughing, headaches, neck pain, rash, rhinorrhea, sore throat or vomiting. He has tried NSAIDs and ear drops for the symptoms. The treatment provided no relief. There is no history of a chronic ear infection, hearing loss or a tympanostomy tube.       Review of Systems   HENT:  Positive for ear discharge, ear pain and hearing loss. Negative for rhinorrhea and sore throat.    Respiratory:  Negative for cough.    Gastrointestinal:  Negative for vomiting.   Musculoskeletal:  Negative for neck pain.   Skin:  Negative for rash.   Neurological:  Negative for headaches.         CURRENT MEDICATIONS:  ibuprofen Tabs  OCUVITE ADULT 50+ PO  Taltz Soaj  VITAMIN C PO  VITAMIN D PO  VITAMIN E PO  Allergies:   Allergies[1]  Current Problems: Evens Garcia does not have any pertinent problems on file.  Past Surgical Hx:    Past Surgical History:   Procedure Laterality Date    KNEE ARTHROSCOPY  1/27/2011    Performed by DANY STUART at [facility]    MENISCECTOMY  1/27/2011    Performed by DANY STUART at [facility]    MENISCECTOMY, KNEE, MEDIAL  1/27/2011    Performed by DANY STUART at [facility]    OTHER ORTHOPEDIC SURGERY  1996    R knee patellar fracture    VASECTOMY  1992    TONSILLECTOMY AND ADENOIDECTOMY  1965      Past Social Hx:  reports that he has never  smoked. He has never used smokeless tobacco. He reports current alcohol use of about 1.0 oz of alcohol per week. He reports that he does not use drugs.    Objective:   /72 (BP Location: Left arm, Patient Position: Sitting, BP Cuff Size: Adult)   Pulse 92   Temp 37.1 °C (98.7 °F) (Temporal)   Resp 16   Ht 1.829 m (6')   Wt 95.6 kg (210 lb 12.8 oz)   SpO2 95%   BMI 28.59 kg/m²   Physical Exam  Vitals and nursing note reviewed.   Constitutional:       General: He is not in acute distress.     Appearance: Normal appearance. He is not ill-appearing.   HENT:      Head: Normocephalic and atraumatic.      Right Ear: External ear normal. Decreased hearing noted. Drainage, swelling and tenderness present. A middle ear effusion is present. Tympanic membrane is bulging.      Left Ear: External ear normal. No tenderness. A middle ear effusion is present. Tympanic membrane is bulging.      Nose: Nose normal. No rhinorrhea.      Mouth/Throat:      Mouth: Mucous membranes are moist.   Eyes:      Extraocular Movements: Extraocular movements intact.      Conjunctiva/sclera: Conjunctivae normal.      Pupils: Pupils are equal, round, and reactive to light.   Cardiovascular:      Rate and Rhythm: Normal rate.   Pulmonary:      Effort: Pulmonary effort is normal.   Musculoskeletal:         General: Normal range of motion.      Cervical back: Normal range of motion and neck supple.   Skin:     General: Skin is warm and dry.   Neurological:      General: No focal deficit present.      Mental Status: He is alert.       Assessment/Plan:   1. Acute swimmer's ear of right side  - predniSONE (DELTASONE) 10 MG Tab; Take 2 Tablets by mouth every day for 5 days.  Dispense: 10 Tablet; Refill: 0  - ciprofloxacin/dexamethasone (CIPRODEX) 0.3-0.1 % Suspension; Administer 4 Drops into affected ear(s) 2 times a day for 7 days.  Dispense: 2.8 mL; Refill: 0  - loratadine/pseudo SR (CLARITIN-D 12 HOUR) 5-120 MG TABLET SR 12 HR; Take 1 Tablet  by mouth 2 times a day for 7 days.  Dispense: 14 Tablet; Refill: 0    Anticipatory guidance and treatment course discussed. Recommend Tylenol, and ibuprofen alternating for pain relief.  Recommend non drowsy antihistamine to help prevent worsening ear pain. Patient/parent has been warned of side effects of steroids including, hyperglycemia, weight gain, gastritis/dyspepsia (instructed to take with food), and insomnia/manic symptoms. Also instructed to avoid oral NSAID use while taking the medication. This medication may cause difficulty sleeping.  Instructed to take early in the day.  Clear instructions provided.  Verified patient/parent/guardian understood by having them repeated back to me. Discussed expected length of time for symptom improvement as well as when to return to clinic for reexam.  Reviewed red flags and ER precautions.  Discussed medication management options, risks and benefits, and alternatives to treatment plan agreed upon. Instructed to continue medications without changes as ordered by primary care unless aforementioned above.  Patient expresses understanding and agrees to plan of care. All questions or concerns answered. For my MDM, I have personally reviewed previous notes, and test results as pertinent to today's visit.    Please note that this dictation was created using voice recognition software. I have made every reasonable attempt to correct obvious errors,  but there may be grammar errors, and possibly content that I did not discover before finalizing the note.   This note was electronically signed by DENVER Johnson              [1]   Allergies  Allergen Reactions    Nkda [No Known Drug Allergy]